# Patient Record
Sex: MALE | Race: WHITE
[De-identification: names, ages, dates, MRNs, and addresses within clinical notes are randomized per-mention and may not be internally consistent; named-entity substitution may affect disease eponyms.]

---

## 2017-08-07 ENCOUNTER — RX RENEWAL (OUTPATIENT)
Age: 77
End: 2017-08-07

## 2021-01-01 ENCOUNTER — INPATIENT (INPATIENT)
Facility: HOSPITAL | Age: 81
LOS: 12 days | DRG: 870 | End: 2021-03-30
Attending: SURGERY | Admitting: INTERNAL MEDICINE
Payer: COMMERCIAL

## 2021-01-01 VITALS
TEMPERATURE: 101 F | OXYGEN SATURATION: 89 % | DIASTOLIC BLOOD PRESSURE: 77 MMHG | WEIGHT: 134.92 LBS | SYSTOLIC BLOOD PRESSURE: 173 MMHG | HEIGHT: 62 IN | RESPIRATION RATE: 28 BRPM | HEART RATE: 118 BPM

## 2021-01-01 DIAGNOSIS — U07.1 COVID-19: ICD-10-CM

## 2021-01-01 LAB
ADD ON TEST-SPECIMEN IN LAB: SIGNIFICANT CHANGE UP
ALBUMIN SERPL ELPH-MCNC: 1.8 G/DL — LOW (ref 3.3–5)
ALBUMIN SERPL ELPH-MCNC: 1.8 G/DL — LOW (ref 3.3–5)
ALBUMIN SERPL ELPH-MCNC: 1.9 G/DL — LOW (ref 3.3–5)
ALBUMIN SERPL ELPH-MCNC: 2 G/DL — LOW (ref 3.3–5)
ALBUMIN SERPL ELPH-MCNC: 2.1 G/DL — LOW (ref 3.3–5)
ALBUMIN SERPL ELPH-MCNC: 2.3 G/DL — LOW (ref 3.3–5)
ALBUMIN SERPL ELPH-MCNC: 2.3 G/DL — LOW (ref 3.3–5)
ALBUMIN SERPL ELPH-MCNC: 2.6 G/DL — LOW (ref 3.3–5)
ALP SERPL-CCNC: 121 U/L — HIGH (ref 40–120)
ALP SERPL-CCNC: 132 U/L — HIGH (ref 40–120)
ALP SERPL-CCNC: 140 U/L — HIGH (ref 40–120)
ALP SERPL-CCNC: 142 U/L — HIGH (ref 40–120)
ALP SERPL-CCNC: 145 U/L — HIGH (ref 40–120)
ALP SERPL-CCNC: 147 U/L — HIGH (ref 40–120)
ALP SERPL-CCNC: 151 U/L — HIGH (ref 40–120)
ALP SERPL-CCNC: 155 U/L — HIGH (ref 40–120)
ALP SERPL-CCNC: 160 U/L — HIGH (ref 40–120)
ALP SERPL-CCNC: 82 U/L — SIGNIFICANT CHANGE UP (ref 40–120)
ALP SERPL-CCNC: 98 U/L — SIGNIFICANT CHANGE UP (ref 40–120)
ALT FLD-CCNC: 110 U/L — HIGH (ref 12–78)
ALT FLD-CCNC: 33 U/L — SIGNIFICANT CHANGE UP (ref 12–78)
ALT FLD-CCNC: 38 U/L — SIGNIFICANT CHANGE UP (ref 12–78)
ALT FLD-CCNC: 59 U/L — SIGNIFICANT CHANGE UP (ref 12–78)
ALT FLD-CCNC: 68 U/L — SIGNIFICANT CHANGE UP (ref 12–78)
ALT FLD-CCNC: 73 U/L — SIGNIFICANT CHANGE UP (ref 12–78)
ALT FLD-CCNC: 80 U/L — HIGH (ref 12–78)
ALT FLD-CCNC: 94 U/L — HIGH (ref 12–78)
ALT FLD-CCNC: 95 U/L — HIGH (ref 12–78)
ALT FLD-CCNC: 98 U/L — HIGH (ref 12–78)
ALT FLD-CCNC: 98 U/L — HIGH (ref 12–78)
ANION GAP SERPL CALC-SCNC: 2 MMOL/L — LOW (ref 5–17)
ANION GAP SERPL CALC-SCNC: 4 MMOL/L — LOW (ref 5–17)
ANION GAP SERPL CALC-SCNC: 5 MMOL/L — SIGNIFICANT CHANGE UP (ref 5–17)
ANION GAP SERPL CALC-SCNC: 5 MMOL/L — SIGNIFICANT CHANGE UP (ref 5–17)
ANION GAP SERPL CALC-SCNC: 6 MMOL/L — SIGNIFICANT CHANGE UP (ref 5–17)
ANION GAP SERPL CALC-SCNC: 7 MMOL/L — SIGNIFICANT CHANGE UP (ref 5–17)
ANION GAP SERPL CALC-SCNC: 8 MMOL/L — SIGNIFICANT CHANGE UP (ref 5–17)
APPEARANCE UR: CLEAR — SIGNIFICANT CHANGE UP
APPEARANCE UR: CLEAR — SIGNIFICANT CHANGE UP
AST SERPL-CCNC: 118 U/L — HIGH (ref 15–37)
AST SERPL-CCNC: 120 U/L — HIGH (ref 15–37)
AST SERPL-CCNC: 129 U/L — HIGH (ref 15–37)
AST SERPL-CCNC: 134 U/L — HIGH (ref 15–37)
AST SERPL-CCNC: 142 U/L — HIGH (ref 15–37)
AST SERPL-CCNC: 36 U/L — SIGNIFICANT CHANGE UP (ref 15–37)
AST SERPL-CCNC: 38 U/L — HIGH (ref 15–37)
AST SERPL-CCNC: 47 U/L — HIGH (ref 15–37)
AST SERPL-CCNC: 50 U/L — HIGH (ref 15–37)
AST SERPL-CCNC: 54 U/L — HIGH (ref 15–37)
AST SERPL-CCNC: 67 U/L — HIGH (ref 15–37)
BACTERIA # UR AUTO: ABNORMAL
BASE EXCESS BLDA CALC-SCNC: -8.5 MMOL/L — LOW (ref -2–2)
BASE EXCESS BLDA CALC-SCNC: 0.4 MMOL/L — SIGNIFICANT CHANGE UP (ref -2–2)
BASE EXCESS BLDA CALC-SCNC: 1.7 MMOL/L — SIGNIFICANT CHANGE UP (ref -2–2)
BASE EXCESS BLDA CALC-SCNC: 2.2 MMOL/L — HIGH (ref -2–2)
BASE EXCESS BLDA CALC-SCNC: 3.1 MMOL/L — HIGH (ref -2–2)
BASE EXCESS BLDV CALC-SCNC: -1.6 MMOL/L — SIGNIFICANT CHANGE UP (ref -2–2)
BASOPHILS # BLD AUTO: 0.01 K/UL — SIGNIFICANT CHANGE UP (ref 0–0.2)
BASOPHILS NFR BLD AUTO: 0.1 % — SIGNIFICANT CHANGE UP (ref 0–2)
BILIRUB DIRECT SERPL-MCNC: 0.1 MG/DL — SIGNIFICANT CHANGE UP (ref 0–0.2)
BILIRUB DIRECT SERPL-MCNC: 0.2 MG/DL — SIGNIFICANT CHANGE UP (ref 0–0.2)
BILIRUB DIRECT SERPL-MCNC: 0.4 MG/DL — HIGH (ref 0–0.2)
BILIRUB INDIRECT FLD-MCNC: 0.2 MG/DL — SIGNIFICANT CHANGE UP (ref 0.2–1)
BILIRUB INDIRECT FLD-MCNC: 0.3 MG/DL — SIGNIFICANT CHANGE UP (ref 0.2–1)
BILIRUB INDIRECT FLD-MCNC: 0.4 MG/DL — SIGNIFICANT CHANGE UP (ref 0.2–1)
BILIRUB INDIRECT FLD-MCNC: 0.5 MG/DL — SIGNIFICANT CHANGE UP (ref 0.2–1)
BILIRUB SERPL-MCNC: 0.4 MG/DL — SIGNIFICANT CHANGE UP (ref 0.2–1.2)
BILIRUB SERPL-MCNC: 0.5 MG/DL — SIGNIFICANT CHANGE UP (ref 0.2–1.2)
BILIRUB SERPL-MCNC: 0.6 MG/DL — SIGNIFICANT CHANGE UP (ref 0.2–1.2)
BILIRUB SERPL-MCNC: 0.7 MG/DL — SIGNIFICANT CHANGE UP (ref 0.2–1.2)
BILIRUB SERPL-MCNC: 0.8 MG/DL — SIGNIFICANT CHANGE UP (ref 0.2–1.2)
BILIRUB SERPL-MCNC: 0.8 MG/DL — SIGNIFICANT CHANGE UP (ref 0.2–1.2)
BILIRUB UR-MCNC: NEGATIVE — SIGNIFICANT CHANGE UP
BILIRUB UR-MCNC: NEGATIVE — SIGNIFICANT CHANGE UP
BLOOD GAS COMMENTS ARTERIAL: SIGNIFICANT CHANGE UP
BUN SERPL-MCNC: 17 MG/DL — SIGNIFICANT CHANGE UP (ref 7–23)
BUN SERPL-MCNC: 37 MG/DL — HIGH (ref 7–23)
BUN SERPL-MCNC: 45 MG/DL — HIGH (ref 7–23)
BUN SERPL-MCNC: 50 MG/DL — HIGH (ref 7–23)
BUN SERPL-MCNC: 52 MG/DL — HIGH (ref 7–23)
BUN SERPL-MCNC: 53 MG/DL — HIGH (ref 7–23)
BUN SERPL-MCNC: 55 MG/DL — HIGH (ref 7–23)
BUN SERPL-MCNC: 58 MG/DL — HIGH (ref 7–23)
BUN SERPL-MCNC: 60 MG/DL — HIGH (ref 7–23)
BUN SERPL-MCNC: 61 MG/DL — HIGH (ref 7–23)
BUN SERPL-MCNC: 63 MG/DL — HIGH (ref 7–23)
CALCIUM SERPL-MCNC: 8 MG/DL — LOW (ref 8.5–10.1)
CALCIUM SERPL-MCNC: 8.4 MG/DL — LOW (ref 8.5–10.1)
CALCIUM SERPL-MCNC: 8.4 MG/DL — LOW (ref 8.5–10.1)
CALCIUM SERPL-MCNC: 8.5 MG/DL — SIGNIFICANT CHANGE UP (ref 8.5–10.1)
CALCIUM SERPL-MCNC: 8.6 MG/DL — SIGNIFICANT CHANGE UP (ref 8.5–10.1)
CALCIUM SERPL-MCNC: 8.7 MG/DL — SIGNIFICANT CHANGE UP (ref 8.5–10.1)
CALCIUM SERPL-MCNC: 8.9 MG/DL — SIGNIFICANT CHANGE UP (ref 8.5–10.1)
CALCIUM SERPL-MCNC: 8.9 MG/DL — SIGNIFICANT CHANGE UP (ref 8.5–10.1)
CALCIUM SERPL-MCNC: 9.4 MG/DL — SIGNIFICANT CHANGE UP (ref 8.5–10.1)
CALCIUM SERPL-MCNC: 9.5 MG/DL — SIGNIFICANT CHANGE UP (ref 8.5–10.1)
CALCIUM SERPL-MCNC: 9.6 MG/DL — SIGNIFICANT CHANGE UP (ref 8.5–10.1)
CHLORIDE SERPL-SCNC: 106 MMOL/L — SIGNIFICANT CHANGE UP (ref 96–108)
CHLORIDE SERPL-SCNC: 108 MMOL/L — SIGNIFICANT CHANGE UP (ref 96–108)
CHLORIDE SERPL-SCNC: 109 MMOL/L — HIGH (ref 96–108)
CHLORIDE SERPL-SCNC: 109 MMOL/L — HIGH (ref 96–108)
CHLORIDE SERPL-SCNC: 112 MMOL/L — HIGH (ref 96–108)
CHLORIDE SERPL-SCNC: 112 MMOL/L — HIGH (ref 96–108)
CHLORIDE SERPL-SCNC: 113 MMOL/L — HIGH (ref 96–108)
CHLORIDE SERPL-SCNC: 114 MMOL/L — HIGH (ref 96–108)
CHLORIDE SERPL-SCNC: 114 MMOL/L — HIGH (ref 96–108)
CHLORIDE SERPL-SCNC: 117 MMOL/L — HIGH (ref 96–108)
CHLORIDE SERPL-SCNC: 117 MMOL/L — HIGH (ref 96–108)
CK SERPL-CCNC: 1024 U/L — HIGH (ref 26–308)
CK SERPL-CCNC: 1237 U/L — HIGH (ref 26–308)
CK SERPL-CCNC: 1329 U/L — HIGH (ref 26–308)
CK SERPL-CCNC: 1361 U/L — HIGH (ref 26–308)
CO2 SERPL-SCNC: 22 MMOL/L — SIGNIFICANT CHANGE UP (ref 22–31)
CO2 SERPL-SCNC: 23 MMOL/L — SIGNIFICANT CHANGE UP (ref 22–31)
CO2 SERPL-SCNC: 23 MMOL/L — SIGNIFICANT CHANGE UP (ref 22–31)
CO2 SERPL-SCNC: 25 MMOL/L — SIGNIFICANT CHANGE UP (ref 22–31)
CO2 SERPL-SCNC: 26 MMOL/L — SIGNIFICANT CHANGE UP (ref 22–31)
CO2 SERPL-SCNC: 28 MMOL/L — SIGNIFICANT CHANGE UP (ref 22–31)
CO2 SERPL-SCNC: 29 MMOL/L — SIGNIFICANT CHANGE UP (ref 22–31)
COLOR SPEC: ABNORMAL
COLOR SPEC: YELLOW — SIGNIFICANT CHANGE UP
COMMENT - URINE: SIGNIFICANT CHANGE UP
CREAT SERPL-MCNC: 0.73 MG/DL — SIGNIFICANT CHANGE UP (ref 0.5–1.3)
CREAT SERPL-MCNC: 0.75 MG/DL — SIGNIFICANT CHANGE UP (ref 0.5–1.3)
CREAT SERPL-MCNC: 0.8 MG/DL — SIGNIFICANT CHANGE UP (ref 0.5–1.3)
CREAT SERPL-MCNC: 0.83 MG/DL — SIGNIFICANT CHANGE UP (ref 0.5–1.3)
CREAT SERPL-MCNC: 0.84 MG/DL — SIGNIFICANT CHANGE UP (ref 0.5–1.3)
CREAT SERPL-MCNC: 0.87 MG/DL — SIGNIFICANT CHANGE UP (ref 0.5–1.3)
CREAT SERPL-MCNC: 0.9 MG/DL — SIGNIFICANT CHANGE UP (ref 0.5–1.3)
CREAT SERPL-MCNC: 0.91 MG/DL — SIGNIFICANT CHANGE UP (ref 0.5–1.3)
CREAT SERPL-MCNC: 1 MG/DL — SIGNIFICANT CHANGE UP (ref 0.5–1.3)
CREAT SERPL-MCNC: 1.01 MG/DL — SIGNIFICANT CHANGE UP (ref 0.5–1.3)
CREAT SERPL-MCNC: 1.01 MG/DL — SIGNIFICANT CHANGE UP (ref 0.5–1.3)
CREAT SERPL-MCNC: 1.05 MG/DL — SIGNIFICANT CHANGE UP (ref 0.5–1.3)
CREAT SERPL-MCNC: 1.09 MG/DL — SIGNIFICANT CHANGE UP (ref 0.5–1.3)
CRP SERPL-MCNC: 273 MG/L — HIGH
CRP SERPL-MCNC: 35 MG/L — HIGH
CRP SERPL-MCNC: 78 MG/L — HIGH
CRP SERPL-MCNC: 82 MG/L — HIGH
CULTURE RESULTS: NO GROWTH — SIGNIFICANT CHANGE UP
CULTURE RESULTS: SIGNIFICANT CHANGE UP
D DIMER BLD IA.RAPID-MCNC: 2434 NG/ML DDU — HIGH
D DIMER BLD IA.RAPID-MCNC: 3122 NG/ML DDU — HIGH
D DIMER BLD IA.RAPID-MCNC: HIGH NG/ML DDU
DIFF PNL FLD: ABNORMAL
DIFF PNL FLD: ABNORMAL
EOSINOPHIL # BLD AUTO: 0 K/UL — SIGNIFICANT CHANGE UP (ref 0–0.5)
EOSINOPHIL NFR BLD AUTO: 0 % — SIGNIFICANT CHANGE UP (ref 0–6)
EPI CELLS # UR: SIGNIFICANT CHANGE UP
FERRITIN SERPL-MCNC: 475 NG/ML — HIGH (ref 30–400)
FERRITIN SERPL-MCNC: 624 NG/ML — HIGH (ref 30–400)
GAS PNL BLDA: SIGNIFICANT CHANGE UP
GLUCOSE SERPL-MCNC: 102 MG/DL — HIGH (ref 70–99)
GLUCOSE SERPL-MCNC: 111 MG/DL — HIGH (ref 70–99)
GLUCOSE SERPL-MCNC: 113 MG/DL — HIGH (ref 70–99)
GLUCOSE SERPL-MCNC: 114 MG/DL — HIGH (ref 70–99)
GLUCOSE SERPL-MCNC: 115 MG/DL — HIGH (ref 70–99)
GLUCOSE SERPL-MCNC: 118 MG/DL — HIGH (ref 70–99)
GLUCOSE SERPL-MCNC: 118 MG/DL — HIGH (ref 70–99)
GLUCOSE SERPL-MCNC: 119 MG/DL — HIGH (ref 70–99)
GLUCOSE SERPL-MCNC: 120 MG/DL — HIGH (ref 70–99)
GLUCOSE SERPL-MCNC: 136 MG/DL — HIGH (ref 70–99)
GLUCOSE SERPL-MCNC: 85 MG/DL — SIGNIFICANT CHANGE UP (ref 70–99)
GLUCOSE UR QL: NEGATIVE MG/DL — SIGNIFICANT CHANGE UP
GLUCOSE UR QL: NEGATIVE — SIGNIFICANT CHANGE UP
HCO3 BLDA-SCNC: 19 MMOL/L — LOW (ref 21–29)
HCO3 BLDA-SCNC: 25 MMOL/L — SIGNIFICANT CHANGE UP (ref 21–29)
HCO3 BLDA-SCNC: 25 MMOL/L — SIGNIFICANT CHANGE UP (ref 21–29)
HCO3 BLDA-SCNC: 26 MMOL/L — SIGNIFICANT CHANGE UP (ref 21–29)
HCO3 BLDA-SCNC: 26 MMOL/L — SIGNIFICANT CHANGE UP (ref 21–29)
HCO3 BLDV-SCNC: 22 MMOL/L — SIGNIFICANT CHANGE UP (ref 21–29)
HCT VFR BLD CALC: 34.2 % — LOW (ref 39–50)
HCT VFR BLD CALC: 35.4 % — LOW (ref 39–50)
HCT VFR BLD CALC: 35.9 % — LOW (ref 39–50)
HCT VFR BLD CALC: 36.2 % — LOW (ref 39–50)
HCT VFR BLD CALC: 36.8 % — LOW (ref 39–50)
HCT VFR BLD CALC: 38.1 % — LOW (ref 39–50)
HCT VFR BLD CALC: 38.2 % — LOW (ref 39–50)
HCT VFR BLD CALC: 38.3 % — LOW (ref 39–50)
HCT VFR BLD CALC: 40.3 % — SIGNIFICANT CHANGE UP (ref 39–50)
HGB BLD-MCNC: 11.4 G/DL — LOW (ref 13–17)
HGB BLD-MCNC: 11.5 G/DL — LOW (ref 13–17)
HGB BLD-MCNC: 11.7 G/DL — LOW (ref 13–17)
HGB BLD-MCNC: 11.9 G/DL — LOW (ref 13–17)
HGB BLD-MCNC: 12 G/DL — LOW (ref 13–17)
HGB BLD-MCNC: 12 G/DL — LOW (ref 13–17)
HGB BLD-MCNC: 12.4 G/DL — LOW (ref 13–17)
HGB BLD-MCNC: 12.7 G/DL — LOW (ref 13–17)
HGB BLD-MCNC: 12.9 G/DL — LOW (ref 13–17)
HYALINE CASTS # UR AUTO: ABNORMAL /LPF
IMM GRANULOCYTES NFR BLD AUTO: 0.9 % — SIGNIFICANT CHANGE UP (ref 0–1.5)
INR BLD: 1.08 RATIO — SIGNIFICANT CHANGE UP (ref 0.88–1.16)
INR BLD: 1.31 RATIO — HIGH (ref 0.88–1.16)
INR BLD: 1.46 RATIO — HIGH (ref 0.88–1.16)
INR BLD: 1.48 RATIO — HIGH (ref 0.88–1.16)
INR BLD: 1.54 RATIO — HIGH (ref 0.88–1.16)
INR BLD: 1.56 RATIO — HIGH (ref 0.88–1.16)
INR BLD: 1.61 RATIO — HIGH (ref 0.88–1.16)
INR BLD: 1.78 RATIO — HIGH (ref 0.88–1.16)
KETONES UR-MCNC: ABNORMAL
KETONES UR-MCNC: NEGATIVE — SIGNIFICANT CHANGE UP
LACTATE SERPL-SCNC: 1.6 MMOL/L — SIGNIFICANT CHANGE UP (ref 0.7–2)
LACTATE SERPL-SCNC: 1.8 MMOL/L — SIGNIFICANT CHANGE UP (ref 0.7–2)
LACTATE SERPL-SCNC: 2.1 MMOL/L — HIGH (ref 0.7–2)
LACTATE SERPL-SCNC: 2.4 MMOL/L — HIGH (ref 0.7–2)
LEUKOCYTE ESTERASE UR-ACNC: NEGATIVE — SIGNIFICANT CHANGE UP
LEUKOCYTE ESTERASE UR-ACNC: NEGATIVE — SIGNIFICANT CHANGE UP
LYMPHOCYTES # BLD AUTO: 0.55 K/UL — LOW (ref 1–3.3)
LYMPHOCYTES # BLD AUTO: 4 % — LOW (ref 13–44)
MAGNESIUM SERPL-MCNC: 2.4 MG/DL — SIGNIFICANT CHANGE UP (ref 1.6–2.6)
MAGNESIUM SERPL-MCNC: 2.4 MG/DL — SIGNIFICANT CHANGE UP (ref 1.6–2.6)
MAGNESIUM SERPL-MCNC: 2.5 MG/DL — SIGNIFICANT CHANGE UP (ref 1.6–2.6)
MAGNESIUM SERPL-MCNC: 2.6 MG/DL — SIGNIFICANT CHANGE UP (ref 1.6–2.6)
MAGNESIUM SERPL-MCNC: 2.6 MG/DL — SIGNIFICANT CHANGE UP (ref 1.6–2.6)
MAGNESIUM SERPL-MCNC: 2.7 MG/DL — HIGH (ref 1.6–2.6)
MAGNESIUM SERPL-MCNC: 2.7 MG/DL — HIGH (ref 1.6–2.6)
MAGNESIUM SERPL-MCNC: 3 MG/DL — HIGH (ref 1.6–2.6)
MCHC RBC-ENTMCNC: 30.8 PG — SIGNIFICANT CHANGE UP (ref 27–34)
MCHC RBC-ENTMCNC: 30.9 PG — SIGNIFICANT CHANGE UP (ref 27–34)
MCHC RBC-ENTMCNC: 31.1 PG — SIGNIFICANT CHANGE UP (ref 27–34)
MCHC RBC-ENTMCNC: 31.2 PG — SIGNIFICANT CHANGE UP (ref 27–34)
MCHC RBC-ENTMCNC: 31.3 PG — SIGNIFICANT CHANGE UP (ref 27–34)
MCHC RBC-ENTMCNC: 31.3 PG — SIGNIFICANT CHANGE UP (ref 27–34)
MCHC RBC-ENTMCNC: 31.4 GM/DL — LOW (ref 32–36)
MCHC RBC-ENTMCNC: 31.5 GM/DL — LOW (ref 32–36)
MCHC RBC-ENTMCNC: 31.5 PG — SIGNIFICANT CHANGE UP (ref 27–34)
MCHC RBC-ENTMCNC: 31.8 GM/DL — LOW (ref 32–36)
MCHC RBC-ENTMCNC: 32.2 GM/DL — SIGNIFICANT CHANGE UP (ref 32–36)
MCHC RBC-ENTMCNC: 32.4 GM/DL — SIGNIFICANT CHANGE UP (ref 32–36)
MCHC RBC-ENTMCNC: 32.9 GM/DL — SIGNIFICANT CHANGE UP (ref 32–36)
MCHC RBC-ENTMCNC: 33.4 GM/DL — SIGNIFICANT CHANGE UP (ref 32–36)
MCHC RBC-ENTMCNC: 33.6 GM/DL — SIGNIFICANT CHANGE UP (ref 32–36)
MCHC RBC-ENTMCNC: 33.9 GM/DL — SIGNIFICANT CHANGE UP (ref 32–36)
MCV RBC AUTO: 93.2 FL — SIGNIFICANT CHANGE UP (ref 80–100)
MCV RBC AUTO: 93.2 FL — SIGNIFICANT CHANGE UP (ref 80–100)
MCV RBC AUTO: 93.7 FL — SIGNIFICANT CHANGE UP (ref 80–100)
MCV RBC AUTO: 94.5 FL — SIGNIFICANT CHANGE UP (ref 80–100)
MCV RBC AUTO: 96.2 FL — SIGNIFICANT CHANGE UP (ref 80–100)
MCV RBC AUTO: 96.5 FL — SIGNIFICANT CHANGE UP (ref 80–100)
MCV RBC AUTO: 97.1 FL — SIGNIFICANT CHANGE UP (ref 80–100)
MCV RBC AUTO: 97.6 FL — SIGNIFICANT CHANGE UP (ref 80–100)
MCV RBC AUTO: 99 FL — SIGNIFICANT CHANGE UP (ref 80–100)
MONOCYTES # BLD AUTO: 1.25 K/UL — HIGH (ref 0–0.9)
MONOCYTES NFR BLD AUTO: 9.1 % — SIGNIFICANT CHANGE UP (ref 2–14)
NEUTROPHILS # BLD AUTO: 11.77 K/UL — HIGH (ref 1.8–7.4)
NEUTROPHILS NFR BLD AUTO: 85.9 % — HIGH (ref 43–77)
NITRITE UR-MCNC: NEGATIVE — SIGNIFICANT CHANGE UP
NITRITE UR-MCNC: NEGATIVE — SIGNIFICANT CHANGE UP
PCO2 BLDA: 33 MMHG — SIGNIFICANT CHANGE UP (ref 32–46)
PCO2 BLDA: 36 MMHG — SIGNIFICANT CHANGE UP (ref 32–46)
PCO2 BLDA: 40 MMHG — SIGNIFICANT CHANGE UP (ref 32–46)
PCO2 BLDA: 42 MMHG — SIGNIFICANT CHANGE UP (ref 32–46)
PCO2 BLDA: 52 MMHG — HIGH (ref 32–46)
PCO2 BLDV: 34 MMHG — LOW (ref 35–50)
PH BLDA: 7.19 — CRITICAL LOW (ref 7.35–7.45)
PH BLDA: 7.39 — SIGNIFICANT CHANGE UP (ref 7.35–7.45)
PH BLDA: 7.43 — SIGNIFICANT CHANGE UP (ref 7.35–7.45)
PH BLDA: 7.46 — HIGH (ref 7.35–7.45)
PH BLDA: 7.5 — HIGH (ref 7.35–7.45)
PH BLDV: 7.42 — SIGNIFICANT CHANGE UP (ref 7.35–7.45)
PH UR: 5 — SIGNIFICANT CHANGE UP (ref 5–8)
PH UR: 5 — SIGNIFICANT CHANGE UP (ref 5–8)
PHOSPHATE SERPL-MCNC: 2.6 MG/DL — SIGNIFICANT CHANGE UP (ref 2.5–4.5)
PHOSPHATE SERPL-MCNC: 2.7 MG/DL — SIGNIFICANT CHANGE UP (ref 2.5–4.5)
PHOSPHATE SERPL-MCNC: 2.8 MG/DL — SIGNIFICANT CHANGE UP (ref 2.5–4.5)
PHOSPHATE SERPL-MCNC: 2.9 MG/DL — SIGNIFICANT CHANGE UP (ref 2.5–4.5)
PHOSPHATE SERPL-MCNC: 2.9 MG/DL — SIGNIFICANT CHANGE UP (ref 2.5–4.5)
PHOSPHATE SERPL-MCNC: 3.1 MG/DL — SIGNIFICANT CHANGE UP (ref 2.5–4.5)
PHOSPHATE SERPL-MCNC: 3.6 MG/DL — SIGNIFICANT CHANGE UP (ref 2.5–4.5)
PHOSPHATE SERPL-MCNC: 4.2 MG/DL — SIGNIFICANT CHANGE UP (ref 2.5–4.5)
PLATELET # BLD AUTO: 424 K/UL — HIGH (ref 150–400)
PLATELET # BLD AUTO: 447 K/UL — HIGH (ref 150–400)
PLATELET # BLD AUTO: 453 K/UL — HIGH (ref 150–400)
PLATELET # BLD AUTO: 461 K/UL — HIGH (ref 150–400)
PLATELET # BLD AUTO: 476 K/UL — HIGH (ref 150–400)
PLATELET # BLD AUTO: 486 K/UL — HIGH (ref 150–400)
PLATELET # BLD AUTO: 513 K/UL — HIGH (ref 150–400)
PLATELET # BLD AUTO: 557 K/UL — HIGH (ref 150–400)
PLATELET # BLD AUTO: 688 K/UL — HIGH (ref 150–400)
PO2 BLDA: 105 MMHG — SIGNIFICANT CHANGE UP (ref 74–108)
PO2 BLDA: 70 MMHG — LOW (ref 74–108)
PO2 BLDA: 81 MMHG — SIGNIFICANT CHANGE UP (ref 74–108)
PO2 BLDA: 84 MMHG — SIGNIFICANT CHANGE UP (ref 74–108)
PO2 BLDA: 84 MMHG — SIGNIFICANT CHANGE UP (ref 74–108)
PO2 BLDV: 36 MMHG — SIGNIFICANT CHANGE UP (ref 25–45)
POTASSIUM SERPL-MCNC: 3.9 MMOL/L — SIGNIFICANT CHANGE UP (ref 3.5–5.3)
POTASSIUM SERPL-MCNC: 3.9 MMOL/L — SIGNIFICANT CHANGE UP (ref 3.5–5.3)
POTASSIUM SERPL-MCNC: 4 MMOL/L — SIGNIFICANT CHANGE UP (ref 3.5–5.3)
POTASSIUM SERPL-MCNC: 4.1 MMOL/L — SIGNIFICANT CHANGE UP (ref 3.5–5.3)
POTASSIUM SERPL-MCNC: 4.1 MMOL/L — SIGNIFICANT CHANGE UP (ref 3.5–5.3)
POTASSIUM SERPL-MCNC: 4.3 MMOL/L — SIGNIFICANT CHANGE UP (ref 3.5–5.3)
POTASSIUM SERPL-MCNC: 4.5 MMOL/L — SIGNIFICANT CHANGE UP (ref 3.5–5.3)
POTASSIUM SERPL-MCNC: 4.7 MMOL/L — SIGNIFICANT CHANGE UP (ref 3.5–5.3)
POTASSIUM SERPL-SCNC: 3.9 MMOL/L — SIGNIFICANT CHANGE UP (ref 3.5–5.3)
POTASSIUM SERPL-SCNC: 3.9 MMOL/L — SIGNIFICANT CHANGE UP (ref 3.5–5.3)
POTASSIUM SERPL-SCNC: 4 MMOL/L — SIGNIFICANT CHANGE UP (ref 3.5–5.3)
POTASSIUM SERPL-SCNC: 4.1 MMOL/L — SIGNIFICANT CHANGE UP (ref 3.5–5.3)
POTASSIUM SERPL-SCNC: 4.1 MMOL/L — SIGNIFICANT CHANGE UP (ref 3.5–5.3)
POTASSIUM SERPL-SCNC: 4.3 MMOL/L — SIGNIFICANT CHANGE UP (ref 3.5–5.3)
POTASSIUM SERPL-SCNC: 4.5 MMOL/L — SIGNIFICANT CHANGE UP (ref 3.5–5.3)
POTASSIUM SERPL-SCNC: 4.7 MMOL/L — SIGNIFICANT CHANGE UP (ref 3.5–5.3)
PROCALCITONIN SERPL-MCNC: 1.89 NG/ML — HIGH (ref 0.02–0.1)
PROT SERPL-MCNC: 5.8 GM/DL — LOW (ref 6–8.3)
PROT SERPL-MCNC: 6.1 GM/DL — SIGNIFICANT CHANGE UP (ref 6–8.3)
PROT SERPL-MCNC: 6.1 GM/DL — SIGNIFICANT CHANGE UP (ref 6–8.3)
PROT SERPL-MCNC: 6.6 GM/DL — SIGNIFICANT CHANGE UP (ref 6–8.3)
PROT SERPL-MCNC: 6.9 GM/DL — SIGNIFICANT CHANGE UP (ref 6–8.3)
PROT SERPL-MCNC: 7 GM/DL — SIGNIFICANT CHANGE UP (ref 6–8.3)
PROT SERPL-MCNC: 7.2 GM/DL — SIGNIFICANT CHANGE UP (ref 6–8.3)
PROT SERPL-MCNC: 7.3 GM/DL — SIGNIFICANT CHANGE UP (ref 6–8.3)
PROT SERPL-MCNC: 7.4 GM/DL — SIGNIFICANT CHANGE UP (ref 6–8.3)
PROT SERPL-MCNC: 7.5 GM/DL — SIGNIFICANT CHANGE UP (ref 6–8.3)
PROT SERPL-MCNC: 8.3 GM/DL — SIGNIFICANT CHANGE UP (ref 6–8.3)
PROT UR-MCNC: 100
PROT UR-MCNC: 30 MG/DL
PROTHROM AB SERPL-ACNC: 12.5 SEC — SIGNIFICANT CHANGE UP (ref 10.6–13.6)
PROTHROM AB SERPL-ACNC: 15.1 SEC — HIGH (ref 10.6–13.6)
PROTHROM AB SERPL-ACNC: 16.8 SEC — HIGH (ref 10.6–13.6)
PROTHROM AB SERPL-ACNC: 16.9 SEC — HIGH (ref 10.6–13.6)
PROTHROM AB SERPL-ACNC: 17.6 SEC — HIGH (ref 10.6–13.6)
PROTHROM AB SERPL-ACNC: 17.7 SEC — HIGH (ref 10.6–13.6)
PROTHROM AB SERPL-ACNC: 18.3 SEC — HIGH (ref 10.6–13.6)
PROTHROM AB SERPL-ACNC: 20.1 SEC — HIGH (ref 10.6–13.6)
RBC # BLD: 3.67 M/UL — LOW (ref 4.2–5.8)
RBC # BLD: 3.67 M/UL — LOW (ref 4.2–5.8)
RBC # BLD: 3.79 M/UL — LOW (ref 4.2–5.8)
RBC # BLD: 3.83 M/UL — LOW (ref 4.2–5.8)
RBC # BLD: 3.83 M/UL — LOW (ref 4.2–5.8)
RBC # BLD: 3.86 M/UL — LOW (ref 4.2–5.8)
RBC # BLD: 3.98 M/UL — LOW (ref 4.2–5.8)
RBC # BLD: 4.09 M/UL — LOW (ref 4.2–5.8)
RBC # BLD: 4.13 M/UL — LOW (ref 4.2–5.8)
RBC # FLD: 13.3 % — SIGNIFICANT CHANGE UP (ref 10.3–14.5)
RBC # FLD: 13.3 % — SIGNIFICANT CHANGE UP (ref 10.3–14.5)
RBC # FLD: 13.5 % — SIGNIFICANT CHANGE UP (ref 10.3–14.5)
RBC # FLD: 13.6 % — SIGNIFICANT CHANGE UP (ref 10.3–14.5)
RBC # FLD: 13.7 % — SIGNIFICANT CHANGE UP (ref 10.3–14.5)
RBC # FLD: 13.7 % — SIGNIFICANT CHANGE UP (ref 10.3–14.5)
RBC CASTS # UR COMP ASSIST: SIGNIFICANT CHANGE UP /HPF (ref 0–4)
SAO2 % BLDA: 92 % — SIGNIFICANT CHANGE UP (ref 92–96)
SAO2 % BLDA: 94 % — SIGNIFICANT CHANGE UP (ref 92–96)
SAO2 % BLDA: 95 % — SIGNIFICANT CHANGE UP (ref 92–96)
SAO2 % BLDA: 96 % — SIGNIFICANT CHANGE UP (ref 92–96)
SAO2 % BLDA: 96 % — SIGNIFICANT CHANGE UP (ref 92–96)
SAO2 % BLDV: 62 % — LOW (ref 67–88)
SARS-COV-2 RNA SPEC QL NAA+PROBE: DETECTED
SODIUM SERPL-SCNC: 136 MMOL/L — SIGNIFICANT CHANGE UP (ref 135–145)
SODIUM SERPL-SCNC: 138 MMOL/L — SIGNIFICANT CHANGE UP (ref 135–145)
SODIUM SERPL-SCNC: 139 MMOL/L — SIGNIFICANT CHANGE UP (ref 135–145)
SODIUM SERPL-SCNC: 140 MMOL/L — SIGNIFICANT CHANGE UP (ref 135–145)
SODIUM SERPL-SCNC: 141 MMOL/L — SIGNIFICANT CHANGE UP (ref 135–145)
SODIUM SERPL-SCNC: 143 MMOL/L — SIGNIFICANT CHANGE UP (ref 135–145)
SODIUM SERPL-SCNC: 144 MMOL/L — SIGNIFICANT CHANGE UP (ref 135–145)
SODIUM SERPL-SCNC: 146 MMOL/L — HIGH (ref 135–145)
SODIUM SERPL-SCNC: 146 MMOL/L — HIGH (ref 135–145)
SODIUM SERPL-SCNC: 147 MMOL/L — HIGH (ref 135–145)
SODIUM SERPL-SCNC: 148 MMOL/L — HIGH (ref 135–145)
SP GR SPEC: 1.01 — SIGNIFICANT CHANGE UP (ref 1.01–1.02)
SP GR SPEC: 1.01 — SIGNIFICANT CHANGE UP (ref 1.01–1.02)
SPECIMEN SOURCE: SIGNIFICANT CHANGE UP
TRIGL SERPL-MCNC: 273 MG/DL — HIGH
TRIGL SERPL-MCNC: 3147 MG/DL — HIGH
TSH SERPL-MCNC: 0.5 UU/ML — SIGNIFICANT CHANGE UP (ref 0.34–4.82)
URATE CRY FLD QL MICRO: ABNORMAL
UROBILINOGEN FLD QL: 4 MG/DL
UROBILINOGEN FLD QL: NEGATIVE — SIGNIFICANT CHANGE UP
WBC # BLD: 12.36 K/UL — HIGH (ref 3.8–10.5)
WBC # BLD: 13.71 K/UL — HIGH (ref 3.8–10.5)
WBC # BLD: 21.29 K/UL — HIGH (ref 3.8–10.5)
WBC # BLD: 24.61 K/UL — HIGH (ref 3.8–10.5)
WBC # BLD: 26.06 K/UL — HIGH (ref 3.8–10.5)
WBC # BLD: 27.04 K/UL — HIGH (ref 3.8–10.5)
WBC # BLD: 27.08 K/UL — HIGH (ref 3.8–10.5)
WBC # BLD: 32.34 K/UL — HIGH (ref 3.8–10.5)
WBC # BLD: 39.1 K/UL — HIGH (ref 3.8–10.5)
WBC # FLD AUTO: 12.36 K/UL — HIGH (ref 3.8–10.5)
WBC # FLD AUTO: 13.71 K/UL — HIGH (ref 3.8–10.5)
WBC # FLD AUTO: 21.29 K/UL — HIGH (ref 3.8–10.5)
WBC # FLD AUTO: 24.61 K/UL — HIGH (ref 3.8–10.5)
WBC # FLD AUTO: 26.06 K/UL — HIGH (ref 3.8–10.5)
WBC # FLD AUTO: 27.04 K/UL — HIGH (ref 3.8–10.5)
WBC # FLD AUTO: 27.08 K/UL — HIGH (ref 3.8–10.5)
WBC # FLD AUTO: 32.34 K/UL — HIGH (ref 3.8–10.5)
WBC # FLD AUTO: 39.1 K/UL — HIGH (ref 3.8–10.5)
WBC UR QL: SIGNIFICANT CHANGE UP

## 2021-01-01 PROCEDURE — 71045 X-RAY EXAM CHEST 1 VIEW: CPT | Mod: 26

## 2021-01-01 PROCEDURE — 86140 C-REACTIVE PROTEIN: CPT

## 2021-01-01 PROCEDURE — 92526 ORAL FUNCTION THERAPY: CPT | Mod: GN

## 2021-01-01 PROCEDURE — 87040 BLOOD CULTURE FOR BACTERIA: CPT

## 2021-01-01 PROCEDURE — 93306 TTE W/DOPPLER COMPLETE: CPT

## 2021-01-01 PROCEDURE — 97530 THERAPEUTIC ACTIVITIES: CPT | Mod: GP

## 2021-01-01 PROCEDURE — 71045 X-RAY EXAM CHEST 1 VIEW: CPT

## 2021-01-01 PROCEDURE — 84100 ASSAY OF PHOSPHORUS: CPT

## 2021-01-01 PROCEDURE — 93880 EXTRACRANIAL BILAT STUDY: CPT

## 2021-01-01 PROCEDURE — 85610 PROTHROMBIN TIME: CPT

## 2021-01-01 PROCEDURE — 97110 THERAPEUTIC EXERCISES: CPT | Mod: GP

## 2021-01-01 PROCEDURE — 80076 HEPATIC FUNCTION PANEL: CPT

## 2021-01-01 PROCEDURE — 99232 SBSQ HOSP IP/OBS MODERATE 35: CPT

## 2021-01-01 PROCEDURE — 93970 EXTREMITY STUDY: CPT | Mod: 26

## 2021-01-01 PROCEDURE — 70544 MR ANGIOGRAPHY HEAD W/O DYE: CPT

## 2021-01-01 PROCEDURE — 99291 CRITICAL CARE FIRST HOUR: CPT

## 2021-01-01 PROCEDURE — 99292 CRITICAL CARE ADDL 30 MIN: CPT

## 2021-01-01 PROCEDURE — 94003 VENT MGMT INPAT SUBQ DAY: CPT

## 2021-01-01 PROCEDURE — 82803 BLOOD GASES ANY COMBINATION: CPT

## 2021-01-01 PROCEDURE — 93880 EXTRACRANIAL BILAT STUDY: CPT | Mod: 26

## 2021-01-01 PROCEDURE — 94002 VENT MGMT INPAT INIT DAY: CPT

## 2021-01-01 PROCEDURE — 82550 ASSAY OF CK (CPK): CPT

## 2021-01-01 PROCEDURE — 70450 CT HEAD/BRAIN W/O DYE: CPT | Mod: 26

## 2021-01-01 PROCEDURE — 85027 COMPLETE CBC AUTOMATED: CPT

## 2021-01-01 PROCEDURE — 94640 AIRWAY INHALATION TREATMENT: CPT

## 2021-01-01 PROCEDURE — 83605 ASSAY OF LACTIC ACID: CPT

## 2021-01-01 PROCEDURE — 99222 1ST HOSP IP/OBS MODERATE 55: CPT

## 2021-01-01 PROCEDURE — 93306 TTE W/DOPPLER COMPLETE: CPT | Mod: 26

## 2021-01-01 PROCEDURE — 99233 SBSQ HOSP IP/OBS HIGH 50: CPT

## 2021-01-01 PROCEDURE — 80053 COMPREHEN METABOLIC PANEL: CPT

## 2021-01-01 PROCEDURE — 70450 CT HEAD/BRAIN W/O DYE: CPT | Mod: 26,77

## 2021-01-01 PROCEDURE — 80048 BASIC METABOLIC PNL TOTAL CA: CPT

## 2021-01-01 PROCEDURE — 84478 ASSAY OF TRIGLYCERIDES: CPT

## 2021-01-01 PROCEDURE — 70551 MRI BRAIN STEM W/O DYE: CPT

## 2021-01-01 PROCEDURE — U0003: CPT

## 2021-01-01 PROCEDURE — C9399: CPT

## 2021-01-01 PROCEDURE — 97162 PT EVAL MOD COMPLEX 30 MIN: CPT | Mod: GP

## 2021-01-01 PROCEDURE — 70450 CT HEAD/BRAIN W/O DYE: CPT

## 2021-01-01 PROCEDURE — 71275 CT ANGIOGRAPHY CHEST: CPT | Mod: 26

## 2021-01-01 PROCEDURE — 93010 ELECTROCARDIOGRAM REPORT: CPT

## 2021-01-01 PROCEDURE — 36600 WITHDRAWAL OF ARTERIAL BLOOD: CPT

## 2021-01-01 PROCEDURE — 92507 TX SP LANG VOICE COMM INDIV: CPT | Mod: GN

## 2021-01-01 PROCEDURE — C9113: CPT

## 2021-01-01 PROCEDURE — 85379 FIBRIN DEGRADATION QUANT: CPT

## 2021-01-01 PROCEDURE — 70547 MR ANGIOGRAPHY NECK W/O DYE: CPT

## 2021-01-01 PROCEDURE — 87086 URINE CULTURE/COLONY COUNT: CPT

## 2021-01-01 PROCEDURE — U0005: CPT

## 2021-01-01 PROCEDURE — 70547 MR ANGIOGRAPHY NECK W/O DYE: CPT | Mod: 26

## 2021-01-01 PROCEDURE — 93926 LOWER EXTREMITY STUDY: CPT | Mod: RT

## 2021-01-01 PROCEDURE — 70551 MRI BRAIN STEM W/O DYE: CPT | Mod: 26

## 2021-01-01 PROCEDURE — 82728 ASSAY OF FERRITIN: CPT

## 2021-01-01 PROCEDURE — 93926 LOWER EXTREMITY STUDY: CPT | Mod: 26,RT

## 2021-01-01 PROCEDURE — 82565 ASSAY OF CREATININE: CPT

## 2021-01-01 PROCEDURE — 82962 GLUCOSE BLOOD TEST: CPT

## 2021-01-01 PROCEDURE — 93970 EXTREMITY STUDY: CPT

## 2021-01-01 PROCEDURE — 97116 GAIT TRAINING THERAPY: CPT | Mod: GP

## 2021-01-01 PROCEDURE — 83735 ASSAY OF MAGNESIUM: CPT

## 2021-01-01 PROCEDURE — 84443 ASSAY THYROID STIM HORMONE: CPT

## 2021-01-01 PROCEDURE — 99223 1ST HOSP IP/OBS HIGH 75: CPT

## 2021-01-01 PROCEDURE — 81001 URINALYSIS AUTO W/SCOPE: CPT

## 2021-01-01 PROCEDURE — 70544 MR ANGIOGRAPHY HEAD W/O DYE: CPT | Mod: 26,59

## 2021-01-01 PROCEDURE — 92610 EVALUATE SWALLOWING FUNCTION: CPT | Mod: GN

## 2021-01-01 PROCEDURE — 36415 COLL VENOUS BLD VENIPUNCTURE: CPT

## 2021-01-01 PROCEDURE — 92523 SPEECH SOUND LANG COMPREHEN: CPT | Mod: GN

## 2021-01-01 RX ORDER — ASPIRIN/CALCIUM CARB/MAGNESIUM 324 MG
81 TABLET ORAL DAILY
Refills: 0 | Status: DISCONTINUED | OUTPATIENT
Start: 2021-01-01 | End: 2021-01-01

## 2021-01-01 RX ORDER — ENOXAPARIN SODIUM 100 MG/ML
40 INJECTION SUBCUTANEOUS DAILY
Refills: 0 | Status: DISCONTINUED | OUTPATIENT
Start: 2021-01-01 | End: 2021-01-01

## 2021-01-01 RX ORDER — ACETAMINOPHEN 500 MG
650 TABLET ORAL EVERY 4 HOURS
Refills: 0 | Status: DISCONTINUED | OUTPATIENT
Start: 2021-01-01 | End: 2021-01-01

## 2021-01-01 RX ORDER — DEXAMETHASONE 0.5 MG/5ML
6 ELIXIR ORAL ONCE
Refills: 0 | Status: COMPLETED | OUTPATIENT
Start: 2021-01-01 | End: 2021-01-01

## 2021-01-01 RX ORDER — PANTOPRAZOLE SODIUM 20 MG/1
40 TABLET, DELAYED RELEASE ORAL DAILY
Refills: 0 | Status: DISCONTINUED | OUTPATIENT
Start: 2021-01-01 | End: 2021-01-01

## 2021-01-01 RX ORDER — FUROSEMIDE 40 MG
20 TABLET ORAL ONCE
Refills: 0 | Status: COMPLETED | OUTPATIENT
Start: 2021-01-01 | End: 2021-01-01

## 2021-01-01 RX ORDER — CARVEDILOL PHOSPHATE 80 MG/1
12.5 CAPSULE, EXTENDED RELEASE ORAL EVERY 12 HOURS
Refills: 0 | Status: DISCONTINUED | OUTPATIENT
Start: 2021-01-01 | End: 2021-01-01

## 2021-01-01 RX ORDER — ASPIRIN/CALCIUM CARB/MAGNESIUM 324 MG
1 TABLET ORAL
Qty: 0 | Refills: 0 | DISCHARGE

## 2021-01-01 RX ORDER — APIXABAN 2.5 MG/1
5 TABLET, FILM COATED ORAL EVERY 12 HOURS
Refills: 0 | Status: DISCONTINUED | OUTPATIENT
Start: 2021-01-01 | End: 2021-01-01

## 2021-01-01 RX ORDER — ASCORBIC ACID 60 MG
500 TABLET,CHEWABLE ORAL DAILY
Refills: 0 | Status: DISCONTINUED | OUTPATIENT
Start: 2021-01-01 | End: 2021-01-01

## 2021-01-01 RX ORDER — DEXAMETHASONE 0.5 MG/5ML
6 ELIXIR ORAL DAILY
Refills: 0 | Status: COMPLETED | OUTPATIENT
Start: 2021-01-01 | End: 2021-01-01

## 2021-01-01 RX ORDER — MIDAZOLAM HYDROCHLORIDE 1 MG/ML
0.02 INJECTION, SOLUTION INTRAMUSCULAR; INTRAVENOUS
Qty: 100 | Refills: 0 | Status: DISCONTINUED | OUTPATIENT
Start: 2021-01-01 | End: 2021-01-01

## 2021-01-01 RX ORDER — FENTANYL CITRATE 50 UG/ML
50 INJECTION INTRAVENOUS ONCE
Refills: 0 | Status: DISCONTINUED | OUTPATIENT
Start: 2021-01-01 | End: 2021-01-01

## 2021-01-01 RX ORDER — ACETAMINOPHEN 500 MG
650 TABLET ORAL ONCE
Refills: 0 | Status: COMPLETED | OUTPATIENT
Start: 2021-01-01 | End: 2021-01-01

## 2021-01-01 RX ORDER — ALBUTEROL 90 UG/1
2 AEROSOL, METERED ORAL EVERY 4 HOURS
Refills: 0 | Status: DISCONTINUED | OUTPATIENT
Start: 2021-01-01 | End: 2021-01-01

## 2021-01-01 RX ORDER — VASOPRESSIN 20 [USP'U]/ML
0.02 INJECTION INTRAVENOUS
Qty: 1 | Refills: 0 | Status: DISCONTINUED | OUTPATIENT
Start: 2021-01-01 | End: 2021-01-01

## 2021-01-01 RX ORDER — CARVEDILOL PHOSPHATE 80 MG/1
1 CAPSULE, EXTENDED RELEASE ORAL
Qty: 0 | Refills: 0 | DISCHARGE

## 2021-01-01 RX ORDER — CARVEDILOL PHOSPHATE 80 MG/1
25 CAPSULE, EXTENDED RELEASE ORAL EVERY 12 HOURS
Refills: 0 | Status: DISCONTINUED | OUTPATIENT
Start: 2021-01-01 | End: 2021-01-01

## 2021-01-01 RX ORDER — CHOLECALCIFEROL (VITAMIN D3) 125 MCG
1 CAPSULE ORAL
Qty: 0 | Refills: 0 | DISCHARGE

## 2021-01-01 RX ORDER — REMDESIVIR 5 MG/ML
100 INJECTION INTRAVENOUS EVERY 24 HOURS
Refills: 0 | Status: COMPLETED | OUTPATIENT
Start: 2021-01-01 | End: 2021-01-01

## 2021-01-01 RX ORDER — CEFEPIME 1 G/1
2000 INJECTION, POWDER, FOR SOLUTION INTRAMUSCULAR; INTRAVENOUS EVERY 12 HOURS
Refills: 0 | Status: DISCONTINUED | OUTPATIENT
Start: 2021-01-01 | End: 2021-01-01

## 2021-01-01 RX ORDER — ALBUTEROL 90 UG/1
2 AEROSOL, METERED ORAL
Qty: 0 | Refills: 0 | DISCHARGE

## 2021-01-01 RX ORDER — SODIUM CHLORIDE 9 MG/ML
1000 INJECTION INTRAMUSCULAR; INTRAVENOUS; SUBCUTANEOUS ONCE
Refills: 0 | Status: COMPLETED | OUTPATIENT
Start: 2021-01-01 | End: 2021-01-01

## 2021-01-01 RX ORDER — SENNA PLUS 8.6 MG/1
2 TABLET ORAL AT BEDTIME
Refills: 0 | Status: DISCONTINUED | OUTPATIENT
Start: 2021-01-01 | End: 2021-01-01

## 2021-01-01 RX ORDER — CHLORHEXIDINE GLUCONATE 213 G/1000ML
15 SOLUTION TOPICAL EVERY 12 HOURS
Refills: 0 | Status: DISCONTINUED | OUTPATIENT
Start: 2021-01-01 | End: 2021-01-01

## 2021-01-01 RX ORDER — REMDESIVIR 5 MG/ML
INJECTION INTRAVENOUS
Refills: 0 | Status: COMPLETED | OUTPATIENT
Start: 2021-01-01 | End: 2021-01-01

## 2021-01-01 RX ORDER — BUDESONIDE AND FORMOTEROL FUMARATE DIHYDRATE 160; 4.5 UG/1; UG/1
2 AEROSOL RESPIRATORY (INHALATION)
Refills: 0 | Status: DISCONTINUED | OUTPATIENT
Start: 2021-01-01 | End: 2021-01-01

## 2021-01-01 RX ORDER — PROPOFOL 10 MG/ML
30 INJECTION, EMULSION INTRAVENOUS ONCE
Refills: 0 | Status: COMPLETED | OUTPATIENT
Start: 2021-01-01 | End: 2021-01-01

## 2021-01-01 RX ORDER — SODIUM CHLORIDE 9 MG/ML
500 INJECTION INTRAMUSCULAR; INTRAVENOUS; SUBCUTANEOUS ONCE
Refills: 0 | Status: COMPLETED | OUTPATIENT
Start: 2021-01-01 | End: 2021-01-01

## 2021-01-01 RX ORDER — REMDESIVIR 5 MG/ML
200 INJECTION INTRAVENOUS EVERY 24 HOURS
Refills: 0 | Status: COMPLETED | OUTPATIENT
Start: 2021-01-01 | End: 2021-01-01

## 2021-01-01 RX ORDER — FENTANYL CITRATE 50 UG/ML
50 INJECTION INTRAVENOUS
Refills: 0 | Status: DISCONTINUED | OUTPATIENT
Start: 2021-01-01 | End: 2021-01-01

## 2021-01-01 RX ORDER — ONDANSETRON 8 MG/1
4 TABLET, FILM COATED ORAL EVERY 6 HOURS
Refills: 0 | Status: DISCONTINUED | OUTPATIENT
Start: 2021-01-01 | End: 2021-01-01

## 2021-01-01 RX ORDER — TOCILIZUMAB 20 MG/ML
400 INJECTION, SOLUTION, CONCENTRATE INTRAVENOUS ONCE
Refills: 0 | Status: COMPLETED | OUTPATIENT
Start: 2021-01-01 | End: 2021-01-01

## 2021-01-01 RX ORDER — PANTOPRAZOLE SODIUM 20 MG/1
40 TABLET, DELAYED RELEASE ORAL EVERY 12 HOURS
Refills: 0 | Status: DISCONTINUED | OUTPATIENT
Start: 2021-01-01 | End: 2021-01-01

## 2021-01-01 RX ORDER — DEXMEDETOMIDINE HYDROCHLORIDE IN 0.9% SODIUM CHLORIDE 4 UG/ML
0.7 INJECTION INTRAVENOUS
Qty: 400 | Refills: 0 | Status: DISCONTINUED | OUTPATIENT
Start: 2021-01-01 | End: 2021-01-01

## 2021-01-01 RX ORDER — PHENYLEPHRINE HYDROCHLORIDE 10 MG/ML
1 INJECTION INTRAVENOUS
Qty: 40 | Refills: 0 | Status: DISCONTINUED | OUTPATIENT
Start: 2021-01-01 | End: 2021-01-01

## 2021-01-01 RX ORDER — BUDESONIDE AND FORMOTEROL FUMARATE DIHYDRATE 160; 4.5 UG/1; UG/1
2 AEROSOL RESPIRATORY (INHALATION)
Qty: 0 | Refills: 0 | DISCHARGE

## 2021-01-01 RX ORDER — VASOPRESSIN 20 [USP'U]/ML
0.02 INJECTION INTRAVENOUS
Qty: 50 | Refills: 0 | Status: DISCONTINUED | OUTPATIENT
Start: 2021-01-01 | End: 2021-01-01

## 2021-01-01 RX ORDER — ZINC SULFATE TAB 220 MG (50 MG ZINC EQUIVALENT) 220 (50 ZN) MG
220 TAB ORAL DAILY
Refills: 0 | Status: DISCONTINUED | OUTPATIENT
Start: 2021-01-01 | End: 2021-01-01

## 2021-01-01 RX ORDER — LANOLIN ALCOHOL/MO/W.PET/CERES
5 CREAM (GRAM) TOPICAL ONCE
Refills: 0 | Status: COMPLETED | OUTPATIENT
Start: 2021-01-01 | End: 2021-01-01

## 2021-01-01 RX ORDER — METOPROLOL TARTRATE 50 MG
5 TABLET ORAL ONCE
Refills: 0 | Status: COMPLETED | OUTPATIENT
Start: 2021-01-01 | End: 2021-01-01

## 2021-01-01 RX ORDER — ETOMIDATE 2 MG/ML
20 INJECTION INTRAVENOUS ONCE
Refills: 0 | Status: COMPLETED | OUTPATIENT
Start: 2021-01-01 | End: 2021-01-01

## 2021-01-01 RX ORDER — SODIUM CHLORIDE 9 MG/ML
1000 INJECTION INTRAMUSCULAR; INTRAVENOUS; SUBCUTANEOUS
Refills: 0 | Status: DISCONTINUED | OUTPATIENT
Start: 2021-01-01 | End: 2021-01-01

## 2021-01-01 RX ORDER — DOXAZOSIN MESYLATE 4 MG
2 TABLET ORAL AT BEDTIME
Refills: 0 | Status: DISCONTINUED | OUTPATIENT
Start: 2021-01-01 | End: 2021-01-01

## 2021-01-01 RX ORDER — GUAIFENESIN/DEXTROMETHORPHAN 600MG-30MG
10 TABLET, EXTENDED RELEASE 12 HR ORAL EVERY 4 HOURS
Refills: 0 | Status: DISCONTINUED | OUTPATIENT
Start: 2021-01-01 | End: 2021-01-01

## 2021-01-01 RX ORDER — POLYETHYLENE GLYCOL 3350 17 G/17G
17 POWDER, FOR SOLUTION ORAL DAILY
Refills: 0 | Status: DISCONTINUED | OUTPATIENT
Start: 2021-01-01 | End: 2021-01-01

## 2021-01-01 RX ORDER — NOREPINEPHRINE BITARTRATE/D5W 8 MG/250ML
0.05 PLASTIC BAG, INJECTION (ML) INTRAVENOUS
Qty: 16 | Refills: 0 | Status: DISCONTINUED | OUTPATIENT
Start: 2021-01-01 | End: 2021-01-01

## 2021-01-01 RX ORDER — PROPOFOL 10 MG/ML
10 INJECTION, EMULSION INTRAVENOUS
Qty: 1000 | Refills: 0 | Status: DISCONTINUED | OUTPATIENT
Start: 2021-01-01 | End: 2021-01-01

## 2021-01-01 RX ORDER — ATORVASTATIN CALCIUM 80 MG/1
40 TABLET, FILM COATED ORAL AT BEDTIME
Refills: 0 | Status: DISCONTINUED | OUTPATIENT
Start: 2021-01-01 | End: 2021-01-01

## 2021-01-01 RX ADMIN — FENTANYL CITRATE 50 MICROGRAM(S): 50 INJECTION INTRAVENOUS at 06:00

## 2021-01-01 RX ADMIN — CARVEDILOL PHOSPHATE 12.5 MILLIGRAM(S): 80 CAPSULE, EXTENDED RELEASE ORAL at 09:59

## 2021-01-01 RX ADMIN — APIXABAN 5 MILLIGRAM(S): 2.5 TABLET, FILM COATED ORAL at 21:00

## 2021-01-01 RX ADMIN — Medication 650 MILLIGRAM(S): at 11:15

## 2021-01-01 RX ADMIN — Medication 650 MILLIGRAM(S): at 00:15

## 2021-01-01 RX ADMIN — APIXABAN 5 MILLIGRAM(S): 2.5 TABLET, FILM COATED ORAL at 09:41

## 2021-01-01 RX ADMIN — PANTOPRAZOLE SODIUM 40 MILLIGRAM(S): 20 TABLET, DELAYED RELEASE ORAL at 21:24

## 2021-01-01 RX ADMIN — FENTANYL CITRATE 50 MICROGRAM(S): 50 INJECTION INTRAVENOUS at 00:52

## 2021-01-01 RX ADMIN — Medication 81 MILLIGRAM(S): at 09:55

## 2021-01-01 RX ADMIN — CARVEDILOL PHOSPHATE 25 MILLIGRAM(S): 80 CAPSULE, EXTENDED RELEASE ORAL at 10:25

## 2021-01-01 RX ADMIN — PHENYLEPHRINE HYDROCHLORIDE 22.3 MICROGRAM(S)/KG/MIN: 10 INJECTION INTRAVENOUS at 16:46

## 2021-01-01 RX ADMIN — PROPOFOL 3.56 MICROGRAM(S)/KG/MIN: 10 INJECTION, EMULSION INTRAVENOUS at 15:45

## 2021-01-01 RX ADMIN — POLYETHYLENE GLYCOL 3350 17 GRAM(S): 17 POWDER, FOR SOLUTION ORAL at 09:58

## 2021-01-01 RX ADMIN — CEFEPIME 100 MILLIGRAM(S): 1 INJECTION, POWDER, FOR SOLUTION INTRAMUSCULAR; INTRAVENOUS at 21:24

## 2021-01-01 RX ADMIN — Medication 2.5 MILLIGRAM(S): at 09:41

## 2021-01-01 RX ADMIN — FENTANYL CITRATE 50 MICROGRAM(S): 50 INJECTION INTRAVENOUS at 11:27

## 2021-01-01 RX ADMIN — CARVEDILOL PHOSPHATE 25 MILLIGRAM(S): 80 CAPSULE, EXTENDED RELEASE ORAL at 09:41

## 2021-01-01 RX ADMIN — CARVEDILOL PHOSPHATE 25 MILLIGRAM(S): 80 CAPSULE, EXTENDED RELEASE ORAL at 21:06

## 2021-01-01 RX ADMIN — CEFEPIME 100 MILLIGRAM(S): 1 INJECTION, POWDER, FOR SOLUTION INTRAMUSCULAR; INTRAVENOUS at 10:27

## 2021-01-01 RX ADMIN — REMDESIVIR 540 MILLIGRAM(S): 5 INJECTION INTRAVENOUS at 10:58

## 2021-01-01 RX ADMIN — CEFEPIME 100 MILLIGRAM(S): 1 INJECTION, POWDER, FOR SOLUTION INTRAMUSCULAR; INTRAVENOUS at 09:50

## 2021-01-01 RX ADMIN — PROPOFOL 30 MILLIGRAM(S): 10 INJECTION, EMULSION INTRAVENOUS at 20:51

## 2021-01-01 RX ADMIN — APIXABAN 5 MILLIGRAM(S): 2.5 TABLET, FILM COATED ORAL at 09:59

## 2021-01-01 RX ADMIN — ZINC SULFATE TAB 220 MG (50 MG ZINC EQUIVALENT) 220 MILLIGRAM(S): 220 (50 ZN) TAB at 10:25

## 2021-01-01 RX ADMIN — REMDESIVIR 540 MILLIGRAM(S): 5 INJECTION INTRAVENOUS at 11:43

## 2021-01-01 RX ADMIN — PROPOFOL 3.56 MICROGRAM(S)/KG/MIN: 10 INJECTION, EMULSION INTRAVENOUS at 17:47

## 2021-01-01 RX ADMIN — BUDESONIDE AND FORMOTEROL FUMARATE DIHYDRATE 2 PUFF(S): 160; 4.5 AEROSOL RESPIRATORY (INHALATION) at 08:26

## 2021-01-01 RX ADMIN — SODIUM CHLORIDE 75 MILLILITER(S): 9 INJECTION INTRAMUSCULAR; INTRAVENOUS; SUBCUTANEOUS at 17:31

## 2021-01-01 RX ADMIN — Medication 650 MILLIGRAM(S): at 19:00

## 2021-01-01 RX ADMIN — Medication 6 MILLIGRAM(S): at 10:57

## 2021-01-01 RX ADMIN — CHLORHEXIDINE GLUCONATE 15 MILLILITER(S): 213 SOLUTION TOPICAL at 05:53

## 2021-01-01 RX ADMIN — Medication 81 MILLIGRAM(S): at 09:41

## 2021-01-01 RX ADMIN — CHLORHEXIDINE GLUCONATE 15 MILLILITER(S): 213 SOLUTION TOPICAL at 21:01

## 2021-01-01 RX ADMIN — PHENYLEPHRINE HYDROCHLORIDE 22.3 MICROGRAM(S)/KG/MIN: 10 INJECTION INTRAVENOUS at 17:30

## 2021-01-01 RX ADMIN — FENTANYL CITRATE 50 MICROGRAM(S): 50 INJECTION INTRAVENOUS at 16:25

## 2021-01-01 RX ADMIN — CEFEPIME 100 MILLIGRAM(S): 1 INJECTION, POWDER, FOR SOLUTION INTRAMUSCULAR; INTRAVENOUS at 10:02

## 2021-01-01 RX ADMIN — APIXABAN 5 MILLIGRAM(S): 2.5 TABLET, FILM COATED ORAL at 21:06

## 2021-01-01 RX ADMIN — CHLORHEXIDINE GLUCONATE 15 MILLILITER(S): 213 SOLUTION TOPICAL at 18:13

## 2021-01-01 RX ADMIN — Medication 2 MILLIGRAM(S): at 22:23

## 2021-01-01 RX ADMIN — Medication 81 MILLIGRAM(S): at 12:23

## 2021-01-01 RX ADMIN — Medication 6 MILLIGRAM(S): at 10:10

## 2021-01-01 RX ADMIN — BUDESONIDE AND FORMOTEROL FUMARATE DIHYDRATE 2 PUFF(S): 160; 4.5 AEROSOL RESPIRATORY (INHALATION) at 20:43

## 2021-01-01 RX ADMIN — PHENYLEPHRINE HYDROCHLORIDE 22.3 MICROGRAM(S)/KG/MIN: 10 INJECTION INTRAVENOUS at 14:45

## 2021-01-01 RX ADMIN — Medication 2 MILLIGRAM(S): at 13:59

## 2021-01-01 RX ADMIN — CEFEPIME 100 MILLIGRAM(S): 1 INJECTION, POWDER, FOR SOLUTION INTRAMUSCULAR; INTRAVENOUS at 21:25

## 2021-01-01 RX ADMIN — BUDESONIDE AND FORMOTEROL FUMARATE DIHYDRATE 2 PUFF(S): 160; 4.5 AEROSOL RESPIRATORY (INHALATION) at 09:01

## 2021-01-01 RX ADMIN — Medication 6 MILLIGRAM(S): at 09:55

## 2021-01-01 RX ADMIN — APIXABAN 5 MILLIGRAM(S): 2.5 TABLET, FILM COATED ORAL at 21:13

## 2021-01-01 RX ADMIN — FENTANYL CITRATE 50 MICROGRAM(S): 50 INJECTION INTRAVENOUS at 21:24

## 2021-01-01 RX ADMIN — CEFEPIME 100 MILLIGRAM(S): 1 INJECTION, POWDER, FOR SOLUTION INTRAMUSCULAR; INTRAVENOUS at 10:09

## 2021-01-01 RX ADMIN — Medication 81 MILLIGRAM(S): at 09:58

## 2021-01-01 RX ADMIN — PANTOPRAZOLE SODIUM 40 MILLIGRAM(S): 20 TABLET, DELAYED RELEASE ORAL at 09:55

## 2021-01-01 RX ADMIN — APIXABAN 5 MILLIGRAM(S): 2.5 TABLET, FILM COATED ORAL at 10:25

## 2021-01-01 RX ADMIN — Medication 650 MILLIGRAM(S): at 18:09

## 2021-01-01 RX ADMIN — ATORVASTATIN CALCIUM 40 MILLIGRAM(S): 80 TABLET, FILM COATED ORAL at 21:25

## 2021-01-01 RX ADMIN — CARVEDILOL PHOSPHATE 12.5 MILLIGRAM(S): 80 CAPSULE, EXTENDED RELEASE ORAL at 10:55

## 2021-01-01 RX ADMIN — SODIUM CHLORIDE 75 MILLILITER(S): 9 INJECTION INTRAMUSCULAR; INTRAVENOUS; SUBCUTANEOUS at 05:20

## 2021-01-01 RX ADMIN — APIXABAN 5 MILLIGRAM(S): 2.5 TABLET, FILM COATED ORAL at 10:57

## 2021-01-01 RX ADMIN — BUDESONIDE AND FORMOTEROL FUMARATE DIHYDRATE 2 PUFF(S): 160; 4.5 AEROSOL RESPIRATORY (INHALATION) at 20:14

## 2021-01-01 RX ADMIN — PHENYLEPHRINE HYDROCHLORIDE 22.3 MICROGRAM(S)/KG/MIN: 10 INJECTION INTRAVENOUS at 02:08

## 2021-01-01 RX ADMIN — APIXABAN 5 MILLIGRAM(S): 2.5 TABLET, FILM COATED ORAL at 09:50

## 2021-01-01 RX ADMIN — CARVEDILOL PHOSPHATE 12.5 MILLIGRAM(S): 80 CAPSULE, EXTENDED RELEASE ORAL at 21:25

## 2021-01-01 RX ADMIN — REMDESIVIR 540 MILLIGRAM(S): 5 INJECTION INTRAVENOUS at 09:58

## 2021-01-01 RX ADMIN — FENTANYL CITRATE 50 MICROGRAM(S): 50 INJECTION INTRAVENOUS at 15:45

## 2021-01-01 RX ADMIN — FENTANYL CITRATE 50 MICROGRAM(S): 50 INJECTION INTRAVENOUS at 03:06

## 2021-01-01 RX ADMIN — PANTOPRAZOLE SODIUM 40 MILLIGRAM(S): 20 TABLET, DELAYED RELEASE ORAL at 09:42

## 2021-01-01 RX ADMIN — Medication 2 MILLIGRAM(S): at 15:19

## 2021-01-01 RX ADMIN — Medication 650 MILLIGRAM(S): at 13:26

## 2021-01-01 RX ADMIN — SODIUM CHLORIDE 75 MILLILITER(S): 9 INJECTION INTRAMUSCULAR; INTRAVENOUS; SUBCUTANEOUS at 02:38

## 2021-01-01 RX ADMIN — Medication 10 MILLILITER(S): at 11:20

## 2021-01-01 RX ADMIN — BUDESONIDE AND FORMOTEROL FUMARATE DIHYDRATE 2 PUFF(S): 160; 4.5 AEROSOL RESPIRATORY (INHALATION) at 20:41

## 2021-01-01 RX ADMIN — PANTOPRAZOLE SODIUM 40 MILLIGRAM(S): 20 TABLET, DELAYED RELEASE ORAL at 10:05

## 2021-01-01 RX ADMIN — REMDESIVIR 540 MILLIGRAM(S): 5 INJECTION INTRAVENOUS at 12:23

## 2021-01-01 RX ADMIN — PANTOPRAZOLE SODIUM 40 MILLIGRAM(S): 20 TABLET, DELAYED RELEASE ORAL at 21:07

## 2021-01-01 RX ADMIN — Medication 650 MILLIGRAM(S): at 05:07

## 2021-01-01 RX ADMIN — MIDAZOLAM HYDROCHLORIDE 1.19 MG/KG/HR: 1 INJECTION, SOLUTION INTRAMUSCULAR; INTRAVENOUS at 09:49

## 2021-01-01 RX ADMIN — CHLORHEXIDINE GLUCONATE 15 MILLILITER(S): 213 SOLUTION TOPICAL at 17:52

## 2021-01-01 RX ADMIN — FENTANYL CITRATE 50 MICROGRAM(S): 50 INJECTION INTRAVENOUS at 22:30

## 2021-01-01 RX ADMIN — SODIUM CHLORIDE 500 MILLILITER(S): 9 INJECTION INTRAMUSCULAR; INTRAVENOUS; SUBCUTANEOUS at 13:57

## 2021-01-01 RX ADMIN — Medication 650 MILLIGRAM(S): at 05:03

## 2021-01-01 RX ADMIN — Medication 650 MILLIGRAM(S): at 18:13

## 2021-01-01 RX ADMIN — Medication 650 MILLIGRAM(S): at 14:00

## 2021-01-01 RX ADMIN — Medication 1 MILLIGRAM(S): at 10:10

## 2021-01-01 RX ADMIN — FENTANYL CITRATE 50 MICROGRAM(S): 50 INJECTION INTRAVENOUS at 11:12

## 2021-01-01 RX ADMIN — PHENYLEPHRINE HYDROCHLORIDE 22.3 MICROGRAM(S)/KG/MIN: 10 INJECTION INTRAVENOUS at 05:20

## 2021-01-01 RX ADMIN — Medication 2.78 MICROGRAM(S)/KG/MIN: at 10:00

## 2021-01-01 RX ADMIN — BUDESONIDE AND FORMOTEROL FUMARATE DIHYDRATE 2 PUFF(S): 160; 4.5 AEROSOL RESPIRATORY (INHALATION) at 20:54

## 2021-01-01 RX ADMIN — REMDESIVIR 540 MILLIGRAM(S): 5 INJECTION INTRAVENOUS at 10:53

## 2021-01-01 RX ADMIN — CARVEDILOL PHOSPHATE 25 MILLIGRAM(S): 80 CAPSULE, EXTENDED RELEASE ORAL at 22:23

## 2021-01-01 RX ADMIN — Medication 2.78 MICROGRAM(S)/KG/MIN: at 15:36

## 2021-01-01 RX ADMIN — Medication 6 MILLIGRAM(S): at 12:35

## 2021-01-01 RX ADMIN — APIXABAN 5 MILLIGRAM(S): 2.5 TABLET, FILM COATED ORAL at 22:23

## 2021-01-01 RX ADMIN — CEFEPIME 100 MILLIGRAM(S): 1 INJECTION, POWDER, FOR SOLUTION INTRAMUSCULAR; INTRAVENOUS at 21:06

## 2021-01-01 RX ADMIN — Medication 6 MILLIGRAM(S): at 09:36

## 2021-01-01 RX ADMIN — PHENYLEPHRINE HYDROCHLORIDE 22.3 MICROGRAM(S)/KG/MIN: 10 INJECTION INTRAVENOUS at 08:45

## 2021-01-01 RX ADMIN — CEFEPIME 100 MILLIGRAM(S): 1 INJECTION, POWDER, FOR SOLUTION INTRAMUSCULAR; INTRAVENOUS at 22:23

## 2021-01-01 RX ADMIN — CEFEPIME 100 MILLIGRAM(S): 1 INJECTION, POWDER, FOR SOLUTION INTRAMUSCULAR; INTRAVENOUS at 09:58

## 2021-01-01 RX ADMIN — REMDESIVIR 540 MILLIGRAM(S): 5 INJECTION INTRAVENOUS at 10:59

## 2021-01-01 RX ADMIN — ETOMIDATE 20 MILLIGRAM(S): 2 INJECTION INTRAVENOUS at 20:51

## 2021-01-01 RX ADMIN — MIDAZOLAM HYDROCHLORIDE 1.19 MG/KG/HR: 1 INJECTION, SOLUTION INTRAMUSCULAR; INTRAVENOUS at 23:19

## 2021-01-01 RX ADMIN — Medication 650 MILLIGRAM(S): at 12:35

## 2021-01-01 RX ADMIN — REMDESIVIR 540 MILLIGRAM(S): 5 INJECTION INTRAVENOUS at 09:59

## 2021-01-01 RX ADMIN — CARVEDILOL PHOSPHATE 12.5 MILLIGRAM(S): 80 CAPSULE, EXTENDED RELEASE ORAL at 21:07

## 2021-01-01 RX ADMIN — Medication 650 MILLIGRAM(S): at 18:39

## 2021-01-01 RX ADMIN — Medication 81 MILLIGRAM(S): at 10:56

## 2021-01-01 RX ADMIN — Medication 2.78 MICROGRAM(S)/KG/MIN: at 13:00

## 2021-01-01 RX ADMIN — Medication 2 MILLIGRAM(S): at 21:12

## 2021-01-01 RX ADMIN — FENTANYL CITRATE 50 MICROGRAM(S): 50 INJECTION INTRAVENOUS at 22:23

## 2021-01-01 RX ADMIN — BUDESONIDE AND FORMOTEROL FUMARATE DIHYDRATE 2 PUFF(S): 160; 4.5 AEROSOL RESPIRATORY (INHALATION) at 23:22

## 2021-01-01 RX ADMIN — PHENYLEPHRINE HYDROCHLORIDE 22.3 MICROGRAM(S)/KG/MIN: 10 INJECTION INTRAVENOUS at 17:06

## 2021-01-01 RX ADMIN — FENTANYL CITRATE 50 MICROGRAM(S): 50 INJECTION INTRAVENOUS at 15:30

## 2021-01-01 RX ADMIN — CARVEDILOL PHOSPHATE 25 MILLIGRAM(S): 80 CAPSULE, EXTENDED RELEASE ORAL at 09:51

## 2021-01-01 RX ADMIN — CHLORHEXIDINE GLUCONATE 15 MILLILITER(S): 213 SOLUTION TOPICAL at 18:01

## 2021-01-01 RX ADMIN — Medication 650 MILLIGRAM(S): at 23:19

## 2021-01-01 RX ADMIN — PROPOFOL 3.56 MICROGRAM(S)/KG/MIN: 10 INJECTION, EMULSION INTRAVENOUS at 19:28

## 2021-01-01 RX ADMIN — ENOXAPARIN SODIUM 40 MILLIGRAM(S): 100 INJECTION SUBCUTANEOUS at 22:37

## 2021-01-01 RX ADMIN — FENTANYL CITRATE 50 MICROGRAM(S): 50 INJECTION INTRAVENOUS at 01:48

## 2021-01-01 RX ADMIN — Medication 5 MILLIGRAM(S): at 18:27

## 2021-01-01 RX ADMIN — CHLORHEXIDINE GLUCONATE 15 MILLILITER(S): 213 SOLUTION TOPICAL at 18:29

## 2021-01-01 RX ADMIN — Medication 6 MILLIGRAM(S): at 10:05

## 2021-01-01 RX ADMIN — Medication 2 MILLIGRAM(S): at 21:24

## 2021-01-01 RX ADMIN — CHLORHEXIDINE GLUCONATE 15 MILLILITER(S): 213 SOLUTION TOPICAL at 17:47

## 2021-01-01 RX ADMIN — FENTANYL CITRATE 50 MICROGRAM(S): 50 INJECTION INTRAVENOUS at 02:03

## 2021-01-01 RX ADMIN — CHLORHEXIDINE GLUCONATE 15 MILLILITER(S): 213 SOLUTION TOPICAL at 05:58

## 2021-01-01 RX ADMIN — Medication 650 MILLIGRAM(S): at 05:42

## 2021-01-01 RX ADMIN — CHLORHEXIDINE GLUCONATE 15 MILLILITER(S): 213 SOLUTION TOPICAL at 18:08

## 2021-01-01 RX ADMIN — Medication 81 MILLIGRAM(S): at 10:25

## 2021-01-01 RX ADMIN — Medication 650 MILLIGRAM(S): at 11:30

## 2021-01-01 RX ADMIN — CARVEDILOL PHOSPHATE 12.5 MILLIGRAM(S): 80 CAPSULE, EXTENDED RELEASE ORAL at 09:43

## 2021-01-01 RX ADMIN — POLYETHYLENE GLYCOL 3350 17 GRAM(S): 17 POWDER, FOR SOLUTION ORAL at 10:09

## 2021-01-01 RX ADMIN — PANTOPRAZOLE SODIUM 40 MILLIGRAM(S): 20 TABLET, DELAYED RELEASE ORAL at 10:21

## 2021-01-01 RX ADMIN — CARVEDILOL PHOSPHATE 12.5 MILLIGRAM(S): 80 CAPSULE, EXTENDED RELEASE ORAL at 21:36

## 2021-01-01 RX ADMIN — POLYETHYLENE GLYCOL 3350 17 GRAM(S): 17 POWDER, FOR SOLUTION ORAL at 09:56

## 2021-01-01 RX ADMIN — ATORVASTATIN CALCIUM 40 MILLIGRAM(S): 80 TABLET, FILM COATED ORAL at 21:36

## 2021-01-01 RX ADMIN — CARVEDILOL PHOSPHATE 12.5 MILLIGRAM(S): 80 CAPSULE, EXTENDED RELEASE ORAL at 21:13

## 2021-01-01 RX ADMIN — Medication 650 MILLIGRAM(S): at 18:40

## 2021-01-01 RX ADMIN — Medication 650 MILLIGRAM(S): at 06:41

## 2021-01-01 RX ADMIN — MIDAZOLAM HYDROCHLORIDE 1.19 MG/KG/HR: 1 INJECTION, SOLUTION INTRAMUSCULAR; INTRAVENOUS at 04:18

## 2021-01-01 RX ADMIN — Medication 650 MILLIGRAM(S): at 18:28

## 2021-01-01 RX ADMIN — FENTANYL CITRATE 50 MICROGRAM(S): 50 INJECTION INTRAVENOUS at 05:33

## 2021-01-01 RX ADMIN — CARVEDILOL PHOSPHATE 12.5 MILLIGRAM(S): 80 CAPSULE, EXTENDED RELEASE ORAL at 21:01

## 2021-01-01 RX ADMIN — CHLORHEXIDINE GLUCONATE 15 MILLILITER(S): 213 SOLUTION TOPICAL at 05:24

## 2021-01-01 RX ADMIN — PROPOFOL 3.56 MICROGRAM(S)/KG/MIN: 10 INJECTION, EMULSION INTRAVENOUS at 21:13

## 2021-01-01 RX ADMIN — APIXABAN 5 MILLIGRAM(S): 2.5 TABLET, FILM COATED ORAL at 21:24

## 2021-01-01 RX ADMIN — BUDESONIDE AND FORMOTEROL FUMARATE DIHYDRATE 2 PUFF(S): 160; 4.5 AEROSOL RESPIRATORY (INHALATION) at 20:11

## 2021-01-01 RX ADMIN — CHLORHEXIDINE GLUCONATE 15 MILLILITER(S): 213 SOLUTION TOPICAL at 04:17

## 2021-01-01 RX ADMIN — CHLORHEXIDINE GLUCONATE 15 MILLILITER(S): 213 SOLUTION TOPICAL at 05:41

## 2021-01-01 RX ADMIN — APIXABAN 5 MILLIGRAM(S): 2.5 TABLET, FILM COATED ORAL at 21:25

## 2021-01-01 RX ADMIN — CHLORHEXIDINE GLUCONATE 15 MILLILITER(S): 213 SOLUTION TOPICAL at 05:04

## 2021-01-01 RX ADMIN — BUDESONIDE AND FORMOTEROL FUMARATE DIHYDRATE 2 PUFF(S): 160; 4.5 AEROSOL RESPIRATORY (INHALATION) at 08:44

## 2021-01-01 RX ADMIN — BUDESONIDE AND FORMOTEROL FUMARATE DIHYDRATE 2 PUFF(S): 160; 4.5 AEROSOL RESPIRATORY (INHALATION) at 11:06

## 2021-01-01 RX ADMIN — APIXABAN 5 MILLIGRAM(S): 2.5 TABLET, FILM COATED ORAL at 10:10

## 2021-01-01 RX ADMIN — Medication 6 MILLIGRAM(S): at 09:59

## 2021-01-01 RX ADMIN — Medication 650 MILLIGRAM(S): at 17:30

## 2021-01-01 RX ADMIN — REMDESIVIR 540 MILLIGRAM(S): 5 INJECTION INTRAVENOUS at 13:06

## 2021-01-01 RX ADMIN — Medication 2.78 MICROGRAM(S)/KG/MIN: at 02:38

## 2021-01-01 RX ADMIN — FENTANYL CITRATE 50 MICROGRAM(S): 50 INJECTION INTRAVENOUS at 03:36

## 2021-01-01 RX ADMIN — FENTANYL CITRATE 50 MICROGRAM(S): 50 INJECTION INTRAVENOUS at 17:29

## 2021-01-01 RX ADMIN — Medication 650 MILLIGRAM(S): at 00:19

## 2021-01-01 RX ADMIN — PROPOFOL 3.56 MICROGRAM(S)/KG/MIN: 10 INJECTION, EMULSION INTRAVENOUS at 07:59

## 2021-01-01 RX ADMIN — Medication 6 MILLIGRAM(S): at 09:51

## 2021-01-01 RX ADMIN — FENTANYL CITRATE 50 MICROGRAM(S): 50 INJECTION INTRAVENOUS at 00:37

## 2021-01-01 RX ADMIN — Medication 81 MILLIGRAM(S): at 09:43

## 2021-01-01 RX ADMIN — VASOPRESSIN 1.2 UNIT(S)/MIN: 20 INJECTION INTRAVENOUS at 10:50

## 2021-01-01 RX ADMIN — Medication 500 MILLIGRAM(S): at 10:27

## 2021-01-01 RX ADMIN — BUDESONIDE AND FORMOTEROL FUMARATE DIHYDRATE 2 PUFF(S): 160; 4.5 AEROSOL RESPIRATORY (INHALATION) at 19:53

## 2021-01-01 RX ADMIN — PANTOPRAZOLE SODIUM 40 MILLIGRAM(S): 20 TABLET, DELAYED RELEASE ORAL at 10:25

## 2021-01-01 RX ADMIN — PROPOFOL 3.56 MICROGRAM(S)/KG/MIN: 10 INJECTION, EMULSION INTRAVENOUS at 13:58

## 2021-01-01 RX ADMIN — DEXMEDETOMIDINE HYDROCHLORIDE IN 0.9% SODIUM CHLORIDE 10.4 MICROGRAM(S)/KG/HR: 4 INJECTION INTRAVENOUS at 22:54

## 2021-01-01 RX ADMIN — CARVEDILOL PHOSPHATE 12.5 MILLIGRAM(S): 80 CAPSULE, EXTENDED RELEASE ORAL at 10:05

## 2021-01-01 RX ADMIN — Medication 6 MILLIGRAM(S): at 09:57

## 2021-01-01 RX ADMIN — APIXABAN 5 MILLIGRAM(S): 2.5 TABLET, FILM COATED ORAL at 21:01

## 2021-01-01 RX ADMIN — ATORVASTATIN CALCIUM 40 MILLIGRAM(S): 80 TABLET, FILM COATED ORAL at 21:01

## 2021-01-01 RX ADMIN — MIDAZOLAM HYDROCHLORIDE 1.19 MG/KG/HR: 1 INJECTION, SOLUTION INTRAMUSCULAR; INTRAVENOUS at 18:28

## 2021-01-01 RX ADMIN — PANTOPRAZOLE SODIUM 40 MILLIGRAM(S): 20 TABLET, DELAYED RELEASE ORAL at 09:59

## 2021-01-01 RX ADMIN — PROPOFOL 3.56 MICROGRAM(S)/KG/MIN: 10 INJECTION, EMULSION INTRAVENOUS at 21:00

## 2021-01-01 RX ADMIN — POLYETHYLENE GLYCOL 3350 17 GRAM(S): 17 POWDER, FOR SOLUTION ORAL at 10:02

## 2021-01-01 RX ADMIN — PHENYLEPHRINE HYDROCHLORIDE 22.3 MICROGRAM(S)/KG/MIN: 10 INJECTION INTRAVENOUS at 10:50

## 2021-01-01 RX ADMIN — REMDESIVIR 580 MILLIGRAM(S): 5 INJECTION INTRAVENOUS at 12:20

## 2021-01-01 RX ADMIN — BUDESONIDE AND FORMOTEROL FUMARATE DIHYDRATE 2 PUFF(S): 160; 4.5 AEROSOL RESPIRATORY (INHALATION) at 09:00

## 2021-01-01 RX ADMIN — CHLORHEXIDINE GLUCONATE 15 MILLILITER(S): 213 SOLUTION TOPICAL at 05:20

## 2021-01-01 RX ADMIN — Medication 500 MILLIGRAM(S): at 10:03

## 2021-01-01 RX ADMIN — ZINC SULFATE TAB 220 MG (50 MG ZINC EQUIVALENT) 220 MILLIGRAM(S): 220 (50 ZN) TAB at 10:03

## 2021-01-01 RX ADMIN — Medication 6 MILLIGRAM(S): at 10:22

## 2021-01-01 RX ADMIN — BUDESONIDE AND FORMOTEROL FUMARATE DIHYDRATE 2 PUFF(S): 160; 4.5 AEROSOL RESPIRATORY (INHALATION) at 19:47

## 2021-01-01 RX ADMIN — FENTANYL CITRATE 50 MICROGRAM(S): 50 INJECTION INTRAVENOUS at 21:49

## 2021-01-01 RX ADMIN — Medication 81 MILLIGRAM(S): at 10:10

## 2021-01-01 RX ADMIN — CHLORHEXIDINE GLUCONATE 15 MILLILITER(S): 213 SOLUTION TOPICAL at 17:30

## 2021-01-01 RX ADMIN — APIXABAN 5 MILLIGRAM(S): 2.5 TABLET, FILM COATED ORAL at 23:46

## 2021-01-01 RX ADMIN — CARVEDILOL PHOSPHATE 25 MILLIGRAM(S): 80 CAPSULE, EXTENDED RELEASE ORAL at 21:24

## 2021-01-01 RX ADMIN — CHLORHEXIDINE GLUCONATE 15 MILLILITER(S): 213 SOLUTION TOPICAL at 06:24

## 2021-01-01 RX ADMIN — APIXABAN 5 MILLIGRAM(S): 2.5 TABLET, FILM COATED ORAL at 10:05

## 2021-01-01 RX ADMIN — CARVEDILOL PHOSPHATE 12.5 MILLIGRAM(S): 80 CAPSULE, EXTENDED RELEASE ORAL at 21:00

## 2021-01-01 RX ADMIN — Medication 5 MILLIGRAM(S): at 21:36

## 2021-01-01 RX ADMIN — FENTANYL CITRATE 50 MICROGRAM(S): 50 INJECTION INTRAVENOUS at 00:30

## 2021-01-01 RX ADMIN — APIXABAN 5 MILLIGRAM(S): 2.5 TABLET, FILM COATED ORAL at 10:22

## 2021-01-01 RX ADMIN — Medication 81 MILLIGRAM(S): at 09:59

## 2021-01-01 RX ADMIN — PROPOFOL 3.56 MICROGRAM(S)/KG/MIN: 10 INJECTION, EMULSION INTRAVENOUS at 23:22

## 2021-01-01 RX ADMIN — APIXABAN 5 MILLIGRAM(S): 2.5 TABLET, FILM COATED ORAL at 21:12

## 2021-01-01 RX ADMIN — CARVEDILOL PHOSPHATE 12.5 MILLIGRAM(S): 80 CAPSULE, EXTENDED RELEASE ORAL at 09:57

## 2021-01-01 RX ADMIN — PROPOFOL 3.56 MICROGRAM(S)/KG/MIN: 10 INJECTION, EMULSION INTRAVENOUS at 10:22

## 2021-01-01 RX ADMIN — APIXABAN 5 MILLIGRAM(S): 2.5 TABLET, FILM COATED ORAL at 09:55

## 2021-01-01 RX ADMIN — CEFEPIME 100 MILLIGRAM(S): 1 INJECTION, POWDER, FOR SOLUTION INTRAMUSCULAR; INTRAVENOUS at 09:41

## 2021-01-01 RX ADMIN — PROPOFOL 3.56 MICROGRAM(S)/KG/MIN: 10 INJECTION, EMULSION INTRAVENOUS at 05:03

## 2021-01-01 RX ADMIN — PROPOFOL 3.56 MICROGRAM(S)/KG/MIN: 10 INJECTION, EMULSION INTRAVENOUS at 03:49

## 2021-01-01 RX ADMIN — Medication 2.5 MILLIGRAM(S): at 09:50

## 2021-01-01 RX ADMIN — Medication 650 MILLIGRAM(S): at 23:45

## 2021-01-01 RX ADMIN — SODIUM CHLORIDE 1000 MILLILITER(S): 9 INJECTION INTRAMUSCULAR; INTRAVENOUS; SUBCUTANEOUS at 10:04

## 2021-01-01 RX ADMIN — Medication 650 MILLIGRAM(S): at 11:53

## 2021-01-01 RX ADMIN — CARVEDILOL PHOSPHATE 12.5 MILLIGRAM(S): 80 CAPSULE, EXTENDED RELEASE ORAL at 09:55

## 2021-01-01 RX ADMIN — PROPOFOL 3.56 MICROGRAM(S)/KG/MIN: 10 INJECTION, EMULSION INTRAVENOUS at 09:58

## 2021-01-01 RX ADMIN — SODIUM CHLORIDE 1000 MILLILITER(S): 9 INJECTION INTRAMUSCULAR; INTRAVENOUS; SUBCUTANEOUS at 02:38

## 2021-01-01 RX ADMIN — PANTOPRAZOLE SODIUM 40 MILLIGRAM(S): 20 TABLET, DELAYED RELEASE ORAL at 22:23

## 2021-01-01 RX ADMIN — SENNA PLUS 2 TABLET(S): 8.6 TABLET ORAL at 21:06

## 2021-01-01 RX ADMIN — BUDESONIDE AND FORMOTEROL FUMARATE DIHYDRATE 2 PUFF(S): 160; 4.5 AEROSOL RESPIRATORY (INHALATION) at 08:16

## 2021-01-01 RX ADMIN — PROPOFOL 3.56 MICROGRAM(S)/KG/MIN: 10 INJECTION, EMULSION INTRAVENOUS at 16:42

## 2021-01-01 RX ADMIN — APIXABAN 5 MILLIGRAM(S): 2.5 TABLET, FILM COATED ORAL at 10:04

## 2021-01-01 RX ADMIN — ATORVASTATIN CALCIUM 40 MILLIGRAM(S): 80 TABLET, FILM COATED ORAL at 23:14

## 2021-01-01 RX ADMIN — Medication 20 MILLIGRAM(S): at 15:55

## 2021-01-01 RX ADMIN — ATORVASTATIN CALCIUM 40 MILLIGRAM(S): 80 TABLET, FILM COATED ORAL at 21:00

## 2021-01-01 RX ADMIN — PROPOFOL 3.56 MICROGRAM(S)/KG/MIN: 10 INJECTION, EMULSION INTRAVENOUS at 21:06

## 2021-01-01 RX ADMIN — Medication 2.5 MILLIGRAM(S): at 09:55

## 2021-01-01 RX ADMIN — BUDESONIDE AND FORMOTEROL FUMARATE DIHYDRATE 2 PUFF(S): 160; 4.5 AEROSOL RESPIRATORY (INHALATION) at 20:45

## 2021-01-01 RX ADMIN — Medication 81 MILLIGRAM(S): at 10:03

## 2021-01-01 RX ADMIN — CHLORHEXIDINE GLUCONATE 15 MILLILITER(S): 213 SOLUTION TOPICAL at 17:21

## 2021-01-01 RX ADMIN — BUDESONIDE AND FORMOTEROL FUMARATE DIHYDRATE 2 PUFF(S): 160; 4.5 AEROSOL RESPIRATORY (INHALATION) at 08:30

## 2021-01-01 RX ADMIN — APIXABAN 5 MILLIGRAM(S): 2.5 TABLET, FILM COATED ORAL at 09:43

## 2021-01-01 RX ADMIN — Medication 650 MILLIGRAM(S): at 05:24

## 2021-01-01 RX ADMIN — PANTOPRAZOLE SODIUM 40 MILLIGRAM(S): 20 TABLET, DELAYED RELEASE ORAL at 10:09

## 2021-01-01 RX ADMIN — CEFEPIME 100 MILLIGRAM(S): 1 INJECTION, POWDER, FOR SOLUTION INTRAMUSCULAR; INTRAVENOUS at 21:13

## 2021-01-01 RX ADMIN — PROPOFOL 3.56 MICROGRAM(S)/KG/MIN: 10 INJECTION, EMULSION INTRAVENOUS at 00:47

## 2021-01-01 RX ADMIN — BUDESONIDE AND FORMOTEROL FUMARATE DIHYDRATE 2 PUFF(S): 160; 4.5 AEROSOL RESPIRATORY (INHALATION) at 07:25

## 2021-01-01 RX ADMIN — FENTANYL CITRATE 50 MICROGRAM(S): 50 INJECTION INTRAVENOUS at 00:09

## 2021-01-01 RX ADMIN — Medication 650 MILLIGRAM(S): at 18:00

## 2021-01-01 RX ADMIN — PROPOFOL 3.56 MICROGRAM(S)/KG/MIN: 10 INJECTION, EMULSION INTRAVENOUS at 10:05

## 2021-01-01 RX ADMIN — MIDAZOLAM HYDROCHLORIDE 1.19 MG/KG/HR: 1 INJECTION, SOLUTION INTRAMUSCULAR; INTRAVENOUS at 14:47

## 2021-01-01 RX ADMIN — CARVEDILOL PHOSPHATE 12.5 MILLIGRAM(S): 80 CAPSULE, EXTENDED RELEASE ORAL at 21:06

## 2021-01-01 RX ADMIN — Medication 81 MILLIGRAM(S): at 09:51

## 2021-01-01 RX ADMIN — VASOPRESSIN 1.2 UNIT(S)/MIN: 20 INJECTION INTRAVENOUS at 06:21

## 2021-01-01 RX ADMIN — MIDAZOLAM HYDROCHLORIDE 1.19 MG/KG/HR: 1 INJECTION, SOLUTION INTRAMUSCULAR; INTRAVENOUS at 06:01

## 2021-01-01 RX ADMIN — PANTOPRAZOLE SODIUM 40 MILLIGRAM(S): 20 TABLET, DELAYED RELEASE ORAL at 10:03

## 2021-01-01 RX ADMIN — Medication 6 MILLIGRAM(S): at 10:30

## 2021-01-01 RX ADMIN — POLYETHYLENE GLYCOL 3350 17 GRAM(S): 17 POWDER, FOR SOLUTION ORAL at 09:50

## 2021-01-01 RX ADMIN — Medication 81 MILLIGRAM(S): at 10:07

## 2021-01-01 RX ADMIN — CHLORHEXIDINE GLUCONATE 15 MILLILITER(S): 213 SOLUTION TOPICAL at 06:01

## 2021-01-01 RX ADMIN — PROPOFOL 3.56 MICROGRAM(S)/KG/MIN: 10 INJECTION, EMULSION INTRAVENOUS at 05:07

## 2021-01-01 RX ADMIN — APIXABAN 5 MILLIGRAM(S): 2.5 TABLET, FILM COATED ORAL at 21:36

## 2021-01-01 RX ADMIN — PROPOFOL 3.56 MICROGRAM(S)/KG/MIN: 10 INJECTION, EMULSION INTRAVENOUS at 10:09

## 2021-01-01 RX ADMIN — TOCILIZUMAB 100 MILLIGRAM(S): 20 INJECTION, SOLUTION, CONCENTRATE INTRAVENOUS at 03:50

## 2021-01-01 RX ADMIN — PANTOPRAZOLE SODIUM 40 MILLIGRAM(S): 20 TABLET, DELAYED RELEASE ORAL at 09:51

## 2021-01-01 RX ADMIN — CEFEPIME 100 MILLIGRAM(S): 1 INJECTION, POWDER, FOR SOLUTION INTRAMUSCULAR; INTRAVENOUS at 21:12

## 2021-01-01 RX ADMIN — CARVEDILOL PHOSPHATE 12.5 MILLIGRAM(S): 80 CAPSULE, EXTENDED RELEASE ORAL at 23:14

## 2021-01-01 RX ADMIN — BUDESONIDE AND FORMOTEROL FUMARATE DIHYDRATE 2 PUFF(S): 160; 4.5 AEROSOL RESPIRATORY (INHALATION) at 09:18

## 2021-01-01 RX ADMIN — CEFEPIME 100 MILLIGRAM(S): 1 INJECTION, POWDER, FOR SOLUTION INTRAMUSCULAR; INTRAVENOUS at 09:56

## 2021-01-01 RX ADMIN — REMDESIVIR 540 MILLIGRAM(S): 5 INJECTION INTRAVENOUS at 10:09

## 2021-01-01 RX ADMIN — SODIUM CHLORIDE 1000 MILLILITER(S): 9 INJECTION INTRAMUSCULAR; INTRAVENOUS; SUBCUTANEOUS at 16:29

## 2021-01-01 RX ADMIN — Medication 2.5 MILLIGRAM(S): at 10:25

## 2021-01-01 RX ADMIN — PANTOPRAZOLE SODIUM 40 MILLIGRAM(S): 20 TABLET, DELAYED RELEASE ORAL at 21:11

## 2021-03-17 NOTE — ED PROVIDER NOTE - PROGRESS NOTE DETAILS
Bárbara Obrien for attending Dr. Johnson: Received call from Dr. Mann, pt's PCP. Pt's gf tested COVID + 1 week and pt tested positive at that time.  Pt has hx of hepatitis C, liver dysfunction, CHF, CKD. Reports pt is a poor historian at baseline. Pt's O2 at home in 80s prompting him to come to ED. Alex LENZ: endorsed to Dr. Vargas for admission; aware CT reads are pending at this time; recs high flow- resp contacted- will be placed on high flow. Alex DO: Patient able to be titrated to NC at this time; high flow held at this time- seen by respiratory therapist. I spoke to patient's Xavier- patient's brother- [phone]- he patient's POA- GO conversation- patient is full code. Per brother- hx of "liver cancer" in 2013, patient never got tested for covid- gf has covid- will swab at this time.

## 2021-03-17 NOTE — H&P ADULT - NSHPLABSRESULTS_GEN_ALL_CORE
12.9   13.71 )-----------( 424      ( 17 Mar 2021 12:38 )             38.1     03-17    136  |  106  |  17  ----------------------------<  114<H>  4.1   |  23  |  1.01    Ca    9.6      17 Mar 2021 12:38    TPro  8.3  /  Alb  2.6<L>  /  TBili  0.8  /  DBili  x   /  AST  38<H>  /  ALT  38  /  AlkPhos  98  03-17    CAPILLARY BLOOD GLUCOSE      < from: CT Angio Chest PE Protocol w/ IV Cont (03.17.21 @ 14:20) >  IMPRESSION:  Bilateral infiltrates consistent with COVID 19 pneumonia.  No evidence of pulmonary embolus.  < end of copied text >

## 2021-03-17 NOTE — H&P ADULT - NSHPREVIEWOFSYSTEMS_GEN_ALL_CORE
ROS:  General:  No fevers, chills, or unexplained weight loss  Skin: No rash or bothersome skin lesions  Musculoskeletal: No arthalgias, myalgias or joint swelling  Eyes: No visual changes or eye pain  Ears: No hearing loss , otorrhea or ear pain  Nose, Mouth, Throat: No nasal congestion, rhinorrhea, oral lesions, postnasal drip or sore throat  Cardio: No chest pain or palpitations. no lower extremity edema. no syncope. no claudication.   Respiratory: sob and coughing  GI: No diarrhea, constipation, blood in stools, abdominal pain, vomiting or heartburn  : No urinary frequency, hematuria, incontinence, or dysuria  Neurologic: No headaches, parasthesias, confusion, dysarthria or gait instability  Psychiatric:  No anxiety or depression  Lymphatic:  No easy bruising, easy bleeding or swollen glands  Allergic: No itching, sneezing , watery eyes, clear rhinorrhea or recurrent infections

## 2021-03-17 NOTE — ED PROVIDER NOTE - NEUROLOGICAL, MLM
Alert and oriented, no focal deficits, no motor or sensory deficits. Awake and alert, no focal deficits, no motor or sensory deficits.

## 2021-03-17 NOTE — H&P ADULT - HISTORY OF PRESENT ILLNESS
79 y/o male with a PMHx of hepatitis C, liver dysfunction, chronic CHF, CKD stage III who presents to the ED c/o SOB.  Pt tested COVID + on 3/7/21.  As per PMD, patient's girlfriend also has COVID.  PMD reports pt is a poor historian at baseline.  Pt's O2 at home in 80s prompting him to come to ED.  In the ER, sats 89% on RA.  Patient started on 5L NC and sats improved to upper 90's.  WBC elevated @ 13.  DDimer high 2434.  CTA negative for PE but positive for bilateral ground glass opacities c/w COVID-19.  Patient received Decadron 6 mg IV x1 in the ER and admitted.        PAST MEDICAL & SURGICAL HISTORY:  Liver dysfunction  CKD (chronic kidney disease)  CHF (congestive heart failure)  Hepatitis C    FAMILY HISTORY:  Unable to obtain as patient is minimally verbal.      Social History:    No present ETOH/ drug use/ tob use.    Lives at home with family.      Allergies:  No Known Allergies    Home Medications:  Albuterol (Eqv-Proventil HFA) 90 mcg/inh inhalation aerosol: 2 puff(s) inhaled every 6 hours, As Needed (17 Mar 2021 14:56)  Aspir 81 oral delayed release tablet: 1 tab(s) orally once a day (17 Mar 2021 14:56)  carvedilol 12.5 mg oral tablet: 1 tab(s) orally 2 times a day (17 Mar 2021 14:56)  Symbicort 160 mcg-4.5 mcg/inh inhalation aerosol: 2 puff(s) inhaled 2 times a day  ***DrFirst*** (17 Mar 2021 14:56)  Vitamin D3 1000 intl units (25 mcg) oral tablet: 1 tab(s) orally once a day (17 Mar 2021 14:56)  Zinc with copper: 1 tab(s) orally once a day (17 Mar 2021 14:56)

## 2021-03-17 NOTE — ED ADULT NURSE NOTE - OBJECTIVE STATEMENT
PT to ED as a known COVID + with c/o SOB. Reports Pt is from home, 02 sat on room air 87%, 92% on 5 LNC. PT noted to have some labored breathing with use of abdominal muscles, PT placed on non rebreather mask @15 L., 02 sat improved to 98%. Pt is a poor historian, PT responds to verbal stimulation but does not verbally respond. PT placed on cardiac and 02 monitoring. Isolation precautions initiated for COVID-19. Will continue to monitor and assess PT. Medications administered as per order, Labs specimens collected and sent to laboratory, pending results.

## 2021-03-17 NOTE — ED PROVIDER NOTE - WR ORDER ID 1
----- Message from Elo Brush NP sent at 6/26/2020 11:04 AM CDT -----  Just in case all of the results were not in for your appointment today: Please call patient.  Electrolytes are normal.  Kidney function is stable.  Liver function is normal.  Blood count (CBC) is normal.  Digoxin level is acceptable.    Keep up the good work!  Continue your current medications.  We will see you at your scheduled follow up appointment.       
I phoned the patient and gave her the results as stated below. She verbalized an understanding.  
[id number]

## 2021-03-17 NOTE — H&P ADULT - NSHPPHYSICALEXAM_GEN_ALL_CORE
PEx  T(C): 37.6 (03-17-21 @ 16:18), Max: 38.4 (03-17-21 @ 12:16)  HR: 93 (03-17-21 @ 17:42) (76 - 118)  BP: 133/67 (03-17-21 @ 17:42) (113/79 - 173/77)  RR: 28 (03-17-21 @ 17:42) (20 - 28)  SpO2: 94% (03-17-21 @ 16:45) (89% - 100%)    General:   chronically ill appearing male.  NAD.    Skin: no rash or prominent lesions  Head: normocephalic, atraumatic     Sinuses: non-tender  Nose: no external lesions, mucosa non-inflamed, septum and turbinates normal  Throat: no erythema, exudates or lesions.  Neck: Supple without lymphadenopathy. Thyroid no thyromegaly, no palpable thyroid nodules, no palpable nodules or masses, carotid arteries no bruits.   Breasts: No palpable masses or lesions.  Heart: RRR, no murmur or gallop.  Normal S1, S2.  No S3, S4.   Lungs: crackles at bases.     Chest wall: Normal insp   Abdomen:  Soft, NT/ND, normal bowel sounds, no HSM, no masses.  No peritoneal signs.   Back: spine normal without deformity or tenderness.  Normal ROM   : Exam normal.  no inguinal hernias.  Extremities: No deformities, clubbing, cyanosis, or edema.  Musculoskeletal: Normal gait and station. No decreased range of motion, instability, atrophy or abnormal strength or tone in the head, neck, spine, ribs, pelvis or extremities.   Neurologic: awake and alert.  makes eye contact.  not able to answer questions.

## 2021-03-17 NOTE — PATIENT PROFILE ADULT - FUNCTIONAL SCREEN CURRENT LEVEL: COMMUNICATION, MLM
unknown, pt not answering questions/ confused-per documentation at baseline/2 = difficulty speaking (not related to language barrier)

## 2021-03-17 NOTE — ED PROVIDER NOTE - OBJECTIVE STATEMENT
81 y/o male with no significant PMHx presents to the ED c/o SOB. Pt tested COVID + on 3/7/21. No other complaints at this time. 81 y/o male with a PMHx  of hepatitis C, liver dysfunction, CHF, CKD presents to the ED c/o SOB. Pt tested COVID + on 3/7/21. No other complaints at this time.

## 2021-03-17 NOTE — ED PROVIDER NOTE - PMH
No pertinent past medical history <<----- Click to add NO pertinent Past Medical History CHF (congestive heart failure)    CKD (chronic kidney disease)    Hepatitis C    Liver dysfunction

## 2021-03-18 NOTE — CONSULT NOTE ADULT - ASSESSMENT
81 y/o male with a PMHx of hepatitis C, liver dysfunction, chronic CHF, CKD stage III who presents to the ED c/o SOB.  Pt tested COVID + on 3/7/21.   He was sent to the ED on 3/17/21 after family reported to his PCP that he had decreased O2 sats.  He was noted to have paucity of speech as early as 3/17 in the AM and was noted to have right sided weakness around 12 PM today.  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    The patient is outside of the window for TPA  He is also outside of the window for any intervention such as thrombectomy. Nevertheless, he is unlikely to have a large vessel occlusion based on the findings on MRI brain.  He has a history of significant allergy to IV contrast and since any intervention is unlikely, the risks of contrast outweigh the benefits at this time.  This was explained to his health care proxy.  -Would get MRA head and neck when possible.  -With D-dimer of 2434, would use anticoagulation for stroke prevention. Avoid heparin (history of heparin induced thrombocytopenia  -Continue atorvastatin  -Swallowing well at this time. Will eventually benefit from speech pathology for aphasia.  -Permissive hypertension.    Patient being upgraded to telemetry.  Discussed with patient's health care proxy, Xavier, and Dr. Vargas.

## 2021-03-18 NOTE — PROGRESS NOTE ADULT - SUBJECTIVE AND OBJECTIVE BOX
History of Present Illness:   81 y/o male with a PMHx of hepatitis C, liver dysfunction, chronic CHF, CKD stage III who presents to the ED c/o SOB.  Pt tested COVID + on 3/7/21.  As per PMD, patient's girlfriend also has COVID.  PMD reports pt is a poor historian at baseline.  Pt's O2 at home in 80s prompting him to come to ED.  In the ER, sats 89% on RA.  Patient started on 5L NC and sats improved to upper 90's.  WBC elevated @ 13.  DDimer high 2434.  CTA negative for PE but positive for bilateral ground glass opacities c/w COVID-19.  Patient received Decadron 6 mg IV x1 in the ER and admitted.      3.18: confused, non verbal        REVIEW OF SYSTEMS:  unable to obtain due to confusion   All other review of systems is negative unless indicated above    Vital Signs Last 24 Hrs  T(C): 36.2 (18 Mar 2021 08:03), Max: 37.6 (17 Mar 2021 16:18)  T(F): 97.2 (18 Mar 2021 08:03), Max: 99.7 (17 Mar 2021 16:18)  HR: 81 (18 Mar 2021 08:03) (76 - 104)  BP: 133/62 (18 Mar 2021 08:03) (123/54 - 148/78)  BP(mean): 87 (17 Mar 2021 21:26) (82 - 97)  RR: 17 (18 Mar 2021 08:03) (17 - 28)  SpO2: 93% (18 Mar 2021 08:03) (90% - 98%)    PHYSICAL EXAM:    GENERAL: NAD, Well nourished  HEENT:  NC/AT, EOMI, PERRLA, No scleral icterus, Moist mucous membranes  NECK: Supple, No JVD  CNS:  Alert & Oriented X0, Motor Strength 5/5 B/L upper and lower extremities; DTRs 2+ intact   LUNG: Normal Breath sounds, Clear to auscultation bilaterally, No rales, No rhonchi, No wheezing  HEART: RRR; No murmurs, No rubs  ABDOMEN: +BS, ST/ND/NT  GENITOURINARY: Voiding, Bladder not distended  EXTREMITIES:  2+ Peripheral Pulses, No clubbing, No cyanosis, No tibial edema  MUSCULOSKELTAL: Joints normal ROM, No TTP, No effusion  SKIN: no rashes  RECTAL: deferred, not indicated  BREAST: deferred                          11.5   12.36 )-----------( 486      ( 18 Mar 2021 09:04 )             34.2     03-18    141  |  112<H>  |  37<H>  ----------------------------<  120<H>  3.9   |  22  |  0.90    Ca    9.4      18 Mar 2021 09:04    TPro  7.2  /  Alb  2.1<L>  /  TBili  0.5  /  DBili  x   /  AST  36  /  ALT  33  /  AlkPhos  82  03-18    Vancomycin levels:   Cultures:     MEDICATIONS  (STANDING):  aspirin enteric coated 81 milliGRAM(s) Oral daily  budesonide 160 MICROgram(s)/formoterol 4.5 MICROgram(s) Inhaler 2 Puff(s) Inhalation two times a day  carvedilol 12.5 milliGRAM(s) Oral every 12 hours  dexAMETHasone  Injectable 6 milliGRAM(s) IV Push daily  enoxaparin Injectable 40 milliGRAM(s) SubCutaneous daily  remdesivir  IVPB   IV Intermittent   remdesivir  IVPB 200 milliGRAM(s) IV Intermittent every 24 hours    MEDICATIONS  (PRN):  acetaminophen   Tablet .. 650 milliGRAM(s) Oral every 4 hours PRN Temp greater or equal to 38.5C (101.3F)  ALBUTerol    90 MICROgram(s) HFA Inhaler 2 Puff(s) Inhalation every 4 hours PRN Shortness of Breath and/or Wheezing  aluminum hydroxide/magnesium hydroxide/simethicone Suspension 30 milliLiter(s) Oral every 4 hours PRN Dyspepsia  benzonatate 100 milliGRAM(s) Oral three times a day PRN Cough  guaifenesin/dextromethorphan  Syrup 10 milliLiter(s) Oral every 4 hours PRN Cough  ondansetron Injectable 4 milliGRAM(s) IV Push every 6 hours PRN Nausea and/or Vomiting      all labs reviewed  all imaging reviewed          A/P    81 y/o male with a PMHx of hepatitis C, liver dysfunction, chronic CHF, CKD stage III who presents to the ED with progessive hypoxia secondary to COVID-19.      #Acute Hypoxic Respiratory failure 2/2 COVID19:    - Pulseox q4 hours.    - Cont O2 supplemental @5L and maintain SpO2 between 88-94%  - if requiring >6L NC-> switch to 100% NRB or HFNC  - IV decadron 6mg IV qd for 5-10 days  - IV Remdezavir   - F/u inflammatory markers. Monitor Q72hrs  - CTA negative for PE.    - Albuterol MDI to limit aerosolization    #Metabolic Encephalopathy:    As per notes, near baseline mental status.    CT head negative.    Ammonia level WNL.    ABG without significant hypercarbia.    Follow.      #Leukocytosis:    WBC usually normal with COVID.    Check UA/ urine culture to r/o UTI.    Repeat labs in am.    Hold on ABX for now.      #Hepatitis C/ Liver Dysfunction:    Stable.  Follow.      #Chronic CHF:    Unknown if diastolic/ systolic.    Cont home meds-- coreg.      #DVT Proph:    Lovenox.      #Advanced Directives:  Xavier (patient's brother) [phone].  He is patient's POA.  Patient is FULL CODE.   History of Present Illness:   81 y/o male with a PMHx of hepatitis C, liver dysfunction, chronic CHF, CKD stage III who presents to the ED c/o SOB.  Pt tested COVID + on 3/7/21.  As per PMD, patient's girlfriend also has COVID.  PMD reports pt is a poor historian at baseline.  Pt's O2 at home in 80s prompting him to come to ED.  In the ER, sats 89% on RA.  Patient started on 5L NC and sats improved to upper 90's.  WBC elevated @ 13.  DDimer high 2434.  CTA negative for PE but positive for bilateral ground glass opacities c/w COVID-19.  Patient received Decadron 6 mg IV x1 in the ER and admitted.      3.18: confused, non verbal        REVIEW OF SYSTEMS:  unable to obtain due to confusion   All other review of systems is negative unless indicated above    Vital Signs Last 24 Hrs  T(C): 36.2 (18 Mar 2021 08:03), Max: 37.6 (17 Mar 2021 16:18)  T(F): 97.2 (18 Mar 2021 08:03), Max: 99.7 (17 Mar 2021 16:18)  HR: 81 (18 Mar 2021 08:03) (76 - 104)  BP: 133/62 (18 Mar 2021 08:03) (123/54 - 148/78)  BP(mean): 87 (17 Mar 2021 21:26) (82 - 97)  RR: 17 (18 Mar 2021 08:03) (17 - 28)  SpO2: 93% (18 Mar 2021 08:03) (90% - 98%)    PHYSICAL EXAM:    GENERAL: NAD, Well nourished  HEENT:  NC/AT, EOMI, PERRLA, No scleral icterus, Moist mucous membranes  NECK: Supple, No JVD  CNS:  Alert & Oriented X0, non verbal, Rt side weakness noted   LUNG: Normal Breath sounds, Clear to auscultation bilaterally, No rales, No rhonchi, No wheezing  HEART: RRR; No murmurs, No rubs  ABDOMEN: +BS, ST/ND/NT  GENITOURINARY: Voiding, Bladder not distended  EXTREMITIES:  2+ Peripheral Pulses, No clubbing, No cyanosis, No tibial edema  MUSCULOSKELTAL: Joints normal ROM, No TTP, No effusion  SKIN: no rashes  RECTAL: deferred, not indicated  BREAST: deferred                          11.5   12.36 )-----------( 486      ( 18 Mar 2021 09:04 )             34.2     03-18    141  |  112<H>  |  37<H>  ----------------------------<  120<H>  3.9   |  22  |  0.90    Ca    9.4      18 Mar 2021 09:04    TPro  7.2  /  Alb  2.1<L>  /  TBili  0.5  /  DBili  x   /  AST  36  /  ALT  33  /  AlkPhos  82  03-18    Vancomycin levels:   Cultures:     MEDICATIONS  (STANDING):  aspirin enteric coated 81 milliGRAM(s) Oral daily  budesonide 160 MICROgram(s)/formoterol 4.5 MICROgram(s) Inhaler 2 Puff(s) Inhalation two times a day  carvedilol 12.5 milliGRAM(s) Oral every 12 hours  dexAMETHasone  Injectable 6 milliGRAM(s) IV Push daily  enoxaparin Injectable 40 milliGRAM(s) SubCutaneous daily  remdesivir  IVPB   IV Intermittent   remdesivir  IVPB 200 milliGRAM(s) IV Intermittent every 24 hours    MEDICATIONS  (PRN):  acetaminophen   Tablet .. 650 milliGRAM(s) Oral every 4 hours PRN Temp greater or equal to 38.5C (101.3F)  ALBUTerol    90 MICROgram(s) HFA Inhaler 2 Puff(s) Inhalation every 4 hours PRN Shortness of Breath and/or Wheezing  aluminum hydroxide/magnesium hydroxide/simethicone Suspension 30 milliLiter(s) Oral every 4 hours PRN Dyspepsia  benzonatate 100 milliGRAM(s) Oral three times a day PRN Cough  guaifenesin/dextromethorphan  Syrup 10 milliLiter(s) Oral every 4 hours PRN Cough  ondansetron Injectable 4 milliGRAM(s) IV Push every 6 hours PRN Nausea and/or Vomiting      all labs reviewed  all imaging reviewed          A/P    81 y/o male with a PMHx of hepatitis C, liver dysfunction, chronic CHF, CKD stage III who presents to the ED with progessive hypoxia secondary to COVID-19.      #Acute Hypoxic Respiratory failure 2/2 COVID19:    - Pulseox q4 hours.    - Cont O2 supplemental @5L and maintain SpO2 between 88-94%  - if requiring >6L NC-> switch to 100% NRB or HFNC  - IV decadron 6mg IV qd for 5-10 days  - IV Remdezavir   - F/u inflammatory markers. Monitor Q72hrs  - CTA negative for PE.    - Albuterol MDI to limit aerosolization    #Metabolic Encephalopathy due to Acute Left CVA:  ASA/Statins  Neuro evaluation   Telemetry  Speech and Swallow eval, PT evaluation   Echocardiogram  not a candidate for TPA due to onset    #Leukocytosis:    WBC usually normal with COVID.    Check UA/ urine culture to r/o UTI.    Repeat labs in am.    Hold on ABX for now.      #Hepatitis C/ Liver Dysfunction:    Stable.  Follow.      #Chronic CHF:    Unknown if diastolic/ systolic.    Cont home meds-- coreg.      #DVT Proph:    Lovenox.      #Advanced Directives:  Xavier (patient's brother) [phone].  He is patient's POA.  Patient is FULL CODE.

## 2021-03-18 NOTE — CONSULT NOTE ADULT - SUBJECTIVE AND OBJECTIVE BOX
Patient is a 80y old  Male who presents with a chief complaint of SOB and hypoxia.    HPI:  81 y/o male with h/o chronic hepatitis C, liver dysfunction, chronic CHF, CKD stage III, recently diagnosed with COVID-19 viral syndrome was admitted on 3/17 for worsening SOB.  Pt tested COVID + on 3/7/21. The pt is a poor historian at baseline. Pt's O2 at home in 80s prompting him to come to ED. In the ER, sats 89% on RA.  Patient started on 5L NC and sats improved to upper 90's.       PAST MEDICAL & SURGICAL HISTORY:  Liver dysfunction  CKD (chronic kidney disease)  CHF (congestive heart failure)  Hepatitis C    Meds: per reconciliation sheet, noted below  MEDICATIONS  (STANDING):  aspirin enteric coated 81 milliGRAM(s) Oral daily  budesonide 160 MICROgram(s)/formoterol 4.5 MICROgram(s) Inhaler 2 Puff(s) Inhalation two times a day  carvedilol 12.5 milliGRAM(s) Oral every 12 hours  dexAMETHasone  Injectable 6 milliGRAM(s) IV Push daily  enoxaparin Injectable 40 milliGRAM(s) SubCutaneous daily    MEDICATIONS  (PRN):  acetaminophen   Tablet .. 650 milliGRAM(s) Oral every 4 hours PRN Temp greater or equal to 38.5C (101.3F)  ALBUTerol    90 MICROgram(s) HFA Inhaler 2 Puff(s) Inhalation every 4 hours PRN Shortness of Breath and/or Wheezing  aluminum hydroxide/magnesium hydroxide/simethicone Suspension 30 milliLiter(s) Oral every 4 hours PRN Dyspepsia  benzonatate 100 milliGRAM(s) Oral three times a day PRN Cough  guaifenesin/dextromethorphan  Syrup 10 milliLiter(s) Oral every 4 hours PRN Cough  ondansetron Injectable 4 milliGRAM(s) IV Push every 6 hours PRN Nausea and/or Vomiting    Allergies    No Known Allergies    Intolerances      Social: no smoking, no alcohol, no illegal drugs; no recent travel, no exposure to TB  patient's girlfriend also has COVID  FAMILY HISTORY:    no history of premature cardiovascular disease in first degree relatives    ROS: the patient denies HA, no seizures, no dizziness, no sore throat, no nasal congestion, no blurry vision, no CP, no palpitations, has SOB, has cough, no abdominal pain, no diarrhea, no N/V, no dysuria, no leg pain, no claudication, no rash, no joint aches, no rectal pain or bleeding, no night sweats; has increased weakness  All other systems reviewed and are negative    Vital Signs Last 24 Hrs  T(C): 36.2 (18 Mar 2021 08:03), Max: 38.4 (17 Mar 2021 12:16)  T(F): 97.2 (18 Mar 2021 08:03), Max: 101.1 (17 Mar 2021 12:16)  HR: 81 (18 Mar 2021 08:03) (76 - 118)  BP: 133/62 (18 Mar 2021 08:03) (113/79 - 173/77)  BP(mean): 87 (17 Mar 2021 21:26) (82 - 97)  RR: 17 (18 Mar 2021 08:03) (17 - 28)  SpO2: 93% (18 Mar 2021 08:03) (89% - 100%)  Daily Height in cm: 157.48 (17 Mar 2021 12:16)    Daily     PE:    Constitutional:  No acute distress  HEENT: NC/AT, EOMI, PERRLA, conjunctivae clear; ears and nose atraumatic; pharynx benign  Neck: supple; thyroid not palpable  Back: no tenderness  Respiratory: respiratory effort normal; crackles at bases  Cardiovascular: S1S2 regular, no murmurs  Abdomen: soft, not tender, not distended, positive BS; no liver or spleen organomegaly  Genitourinary: no suprapubic tenderness  Lymphatic: no LN palpable  Musculoskeletal: no muscle tenderness, no joint swelling or tenderness  Extremities: no pedal edema  Neurological/ Psychiatric: AxOx3, judgement and insight normal; moving all extremities  Skin: no rashes; no palpable lesions    Labs: all available labs reviewed                           1236 )-----------( 486      ( 18 Mar 2021 09:04 )             34.2     03-18    141  |  112<H>  |  37<H>  ----------------------------<  120<H>  3.9   |  22  |  0.90    Ca    9.4      18 Mar 2021 09:04    TPro  7.2  /  Alb  2.1<L>  /  TBili  0.5  /  DBili  x   /  AST  36  /  ALT  33  /  AlkPhos  82  03-18     LIVER FUNCTIONS - ( 18 Mar 2021 09:04 )  Alb: 2.1 g/dL / Pro: 7.2 gm/dL / ALK PHOS: 82 U/L / ALT: 33 U/L / AST: 36 U/L / GGT: x           Urinalysis Basic - ( 18 Mar 2021 04:15 )    Color: Sparkle / Appearance: Clear / S.010 / pH: x  Gluc: x / Ketone: Trace  / Bili: Negative / Urobili: Negative   Blood: x / Protein: 100 / Nitrite: Negative   Leuk Esterase: Negative / RBC: 0-2 /HPF / WBC Negative   Sq Epi: x / Non Sq Epi: Occasional / Bacteria: Negative    COVID-19 PCR: Detected (21 @ 14:45)    Radiology: all available radiological tests reviewed    < from: CT Angio Chest PE Protocol w/ IV Cont (21 @ 14:20) >  Bilateral infiltrates consistent with COVID 19 pneumonia.  No evidence of pulmonary embolus.  < end of copied text >      Advanced directives addressed: full resuscitation

## 2021-03-18 NOTE — CONSULT NOTE ADULT - SUBJECTIVE AND OBJECTIVE BOX
Patient is a 80y old  Male who presents with a chief complaint of     HPI:  81 y/o male with a PMHx of hepatitis C, liver dysfunction, chronic CHF, CKD stage III who presents to the ED c/o SOB.  Pt tested COVID + on 3/7/21.  As per PMD, patient's girlfriend also has COVID.  PMD reports pt is a poor historian at baseline.  Pt's O2 at home in 80s prompting him to come to ED.  In the ER, sats 89% on RA.  Patient started on 5L NC and sats improved to upper 90's.  WBC elevated @ 13.  DDimer high 2434.  CTA negative for PE but positive for bilateral ground glass opacities c/w COVID-19.  Patient received Decadron 6 mg IV x1 in the ER and admitted.      His brother Xavier reports that his first neurological symptom started in the AM on 3/17/21.   His daughter Jasmin alerted Xavier to the fact that he was not speaking in the AM on 3/17/21. The admitting physician's note at 17:35 also noted that he was unable to answer questions.  Xavier's son spoke to him on 3/16/21 at about 8 PM and he seemed fine.  Xavier spoke to him on 3/17/21 at about 10 PM and he was slurring his speech. He was only able to give him "yes" and "no" answers.  Today his nurse detected some right sided weakness around lunch.  MRI brain showed watershed infarcts.    Xavier reports that he has a history of a blocked carotid artery (he believes it is on the right).  He saw a vascular surgeon at [place] who recommended medical therapy.    He has an allergy to contrast as per his brother. He had respiratory arrest and kidney failure in the past after receiving contrast.   He also has a history of heparin induced thrombocytopenia.  He had angioplasty in his left leg and a stent in his right leg. He previously had high blood pressure and then his blood pressure normalized.    PAST MEDICAL & SURGICAL HISTORY:  Liver dysfunction  CKD (chronic kidney disease)  CHF (congestive heart failure)  Hepatitis C    FAMILY HISTORY:  Unable to obtain as patient is minimally verbal.      Social History:    No present ETOH/ drug use/ tob use.    Lives at home with family.        MEDICATIONS  (STANDING):  aspirin enteric coated 81 milliGRAM(s) Oral daily  atorvastatin 40 milliGRAM(s) Oral at bedtime  budesonide 160 MICROgram(s)/formoterol 4.5 MICROgram(s) Inhaler 2 Puff(s) Inhalation two times a day  carvedilol 12.5 milliGRAM(s) Oral every 12 hours  dexAMETHasone  Injectable 6 milliGRAM(s) IV Push daily  enoxaparin Injectable 40 milliGRAM(s) SubCutaneous daily  remdesivir  IVPB   IV Intermittent        Allergies    contrast media (iodine-based) (Anaphylaxis)  heparin containing compounds (Anaphylaxis; Other (Severe))    Intolerances  Plavix -> lightheadedness        ROS: Pertinent positives in HPI, all other ROS were reviewed and are negative.      Vital Signs Last 24 Hrs  T(C): 36 (18 Mar 2021 15:45), Max: 37.1 (17 Mar 2021 18:38)  T(F): 96.8 (18 Mar 2021 15:45), Max: 98.8 (17 Mar 2021 18:38)  HR: 79 (18 Mar 2021 15:45) (79 - 104)  BP: 140/76 (18 Mar 2021 15:45) (123/54 - 147/68)  BP(mean): 87 (17 Mar 2021 21:26) (82 - 88)  RR: 20 (18 Mar 2021 15:45) (17 - 28)  SpO2: 92% (18 Mar 2021 15:45) (90% - 98%)        Constitutional: awake and alert.  HEENT: PERRLA, EOMI,   Neck: Supple.  Respiratory: Breath sounds are clear bilaterally  Cardiovascular: S1 and S2, regular / irregular rhythm  Gastrointestinal: soft, nontender  Extremities:  no edema  Musculoskeletal: no joint swelling/tenderness, no abnormal movements  Skin: No rashes    Neurological exam:  HF: Awake and alert. Makes eye contact.  Does not follow commands. Mute.   CN: BLANCA, EOMI, no facial weakness noted, no NLFD, tongue midline, Palate moves equally,   Motor: Not able to follow commands for confrontation testing but appears to have mild weakness in the right hand (4+/5). 5/5 in LUE. Moving both legs  Sens: withdraws to all extremities  Reflexes: Symmetric and normal . BJ 2+, BR 2+, KJ 2+, AJ 2+, downgoing toes b/l  Coord:  unable to test  Gait/Balance: Not tested          Labs:   03-18    141  |  112<H>  |  37<H>  ----------------------------<  120<H>  3.9   |  22  |  0.90    Ca    9.4      18 Mar 2021 09:04    TPro  7.2  /  Alb  2.1<L>  /  TBili  0.5  /  DBili  x   /  AST  36  /  ALT  33  /  AlkPhos  82  03-18                              11.5   12.36 )-----------( 486      ( 18 Mar 2021 09:04 )             34.2       Radiology:  CT head no contrast 3/17/21:  Suspected chronic sinusitis.  No acute hemorrhage or midline shift. If symptoms persist consider follow up head CT or MRI if no contraindications.    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

## 2021-03-18 NOTE — CONSULT NOTE ADULT - ASSESSMENT
79 y/o male with h/o chronic hepatitis C, liver dysfunction, chronic CHF, CKD stage III, recently diagnosed with COVID-19 viral syndrome was admitted on 3/17 for worsening SOB.  Pt tested COVID + on 3/7/21. The pt is a poor historian at baseline. Pt's O2 at home in 80s prompting him to come to ED. In the ER, sats 89% on RA.  Patient started on 5L NC and sats improved to upper 90's.     1. COVID-19 viral syndrome. Acute respiratory failure. Multifocal pneumonia.   -febrile syndrome  -leukocytosis  -start remdesivir protocol  -remdesivir risks and benefits reviewed with patient and he agreed with the use of the antiviral medication  -O2 therapy  -AC  -steroids  -respiratory care  -droplet isolation  -old chart reviewed to assess prior cultures  -monitor temps  -f/u CBC  -supportive care  2. Other issues:   -care per medicine

## 2021-03-19 NOTE — SWALLOW BEDSIDE ASSESSMENT ADULT - SLP GENERAL OBSERVATIONS
On encounter, bilateral wrist restraints were in place. A loss of bulk was evident in strap muscle regions. He moved his LUE better than RUE. Pt was alert. Able to sustain interactive eye gaze but he was distractible at times. Pt could not be directed to structured communication tasks nor did he follow commands or verbalize. Cognitive linguistic dysfunction noted with Global Aphasia features in setting of acute left CVA/encephalopathy related to COVID.

## 2021-03-19 NOTE — PROVIDER CONTACT NOTE (OTHER) - SITUATION
MD is aware of patients admission to . MD spoke with Ann Marie Garcia RN from [address] [place] [address].
pt dessating on NC, placed on 50% venti mask. pt ripping monitor, mask off, requires wrist restraints at this evaristo
pt with short 2-3 second bursts of svt throughout night. asymptomatic, not sustaining. no cardiology consult in place

## 2021-03-19 NOTE — PHYSICAL THERAPY INITIAL EVALUATION ADULT - MANUAL MUSCLE TESTING RESULTS, REHAB EVAL
unable to follow/comprehend formal testing procedures ,moving LUE/LLE spontaneously and WFL ,able to hold R arm briefly against gravity when arm elevated by PT

## 2021-03-19 NOTE — SWALLOW BEDSIDE ASSESSMENT ADULT - ASR SWALLOW DENTITION
Notable for maxillary denture placement and natural mandibular dentition with several missing posterior lower teeth. Testing was limited buy reduced auditory comprehension. Notable for maxillary denture placement and natural mandibular dentition with several missing posterior lower teeth. Testing was limited by reduced auditory comprehension.

## 2021-03-19 NOTE — SWALLOW BEDSIDE ASSESSMENT ADULT - SWALLOW EVAL: DIAGNOSIS
1) Feeding integrity hindered by his UE's being restrained as well as variably altered cognition. This is atop Oropharyngeal Swallowing abilities which subjectively appears to be grossly within functional parameters for age. NO behavioral aspiration signs exhibited.  2) On encounter, bilateral wrist restraints were in place. A loss of bulk was evident in strap muscle regions. He moved his LUE better than RUE. Pt was alert. Able to sustain interactive eye gaze but he was distractible at times. Pt could not be directed to structured communication tasks nor did he follow commands or verbalize. Cognitive linguistic dysfunction noted with Global Aphasia features in setting of acute left CVA/encephalopathy related to COVID.
10

## 2021-03-19 NOTE — CONSULT NOTE ADULT - SUBJECTIVE AND OBJECTIVE BOX
Patient is a 80y old  Male who presents with a chief complaint of sob    HPI:  81 y/o male with a PMHx of hepatitis C, liver dysfunction, chronic CHF, CKD stage III who presents to the ED c/o SOB.  Pt tested COVID + on 3/7/21.  As per PMD, patient's girlfriend also has COVID.  PMD reports pt is a poor historian at baseline.  Pt's O2 at home in 80s prompting him to come to ED.  In the ER, sats 89% on RA.  Patient started on 5L NC and sats improved to upper 90's.  WBC elevated @ 13.  DDimer high 2434.  CTA negative for PE but positive for bilateral ground glass opacities c/w COVID-19.  Patient received Decadron 6 mg IV x1 in the ER and admitted.      3.17.21 EKG: ST with frequent PVC LBBB/inf directed and discordant aVr/aVl, likely RV septal PVC    TELEMETRY:      CARDIAC TESTS:  PAST MEDICAL & SURGICAL HISTORY:  Liver dysfunction  CKD (chronic kidney disease)  CHF (congestive heart failure)  Hepatitis C    FAMILY HISTORY:  Unable to obtain as patient is minimally verbal.      Social History:    No present ETOH/ drug use/ tob use.    Lives at home with family.      Allergies:  No Known Allergies    Home Medications:  Albuterol (Eqv-Proventil HFA) 90 mcg/inh inhalation aerosol: 2 puff(s) inhaled every 6 hours, As Needed (17 Mar 2021 14:56)  Aspir 81 oral delayed release tablet: 1 tab(s) orally once a day (17 Mar 2021 14:56)  carvedilol 12.5 mg oral tablet: 1 tab(s) orally 2 times a day (17 Mar 2021 14:56)  Symbicort 160 mcg-4.5 mcg/inh inhalation aerosol: 2 puff(s) inhaled 2 times a day  ***DrFirst*** (17 Mar 2021 14:56)  Vitamin D3 1000 intl units (25 mcg) oral tablet: 1 tab(s) orally once a day (17 Mar 2021 14:56)  Zinc with copper: 1 tab(s) orally once a day (17 Mar 2021 14:56      MEDICATIONS  (STANDING):  apixaban 5 milliGRAM(s) Oral every 12 hours  aspirin enteric coated 81 milliGRAM(s) Oral daily  atorvastatin 40 milliGRAM(s) Oral at bedtime  budesonide 160 MICROgram(s)/formoterol 4.5 MICROgram(s) Inhaler 2 Puff(s) Inhalation two times a day  carvedilol 12.5 milliGRAM(s) Oral every 12 hours  dexAMETHasone  Injectable 6 milliGRAM(s) IV Push daily  remdesivir  IVPB   IV Intermittent   remdesivir  IVPB 100 milliGRAM(s) IV Intermittent every 24 hours    MEDICATIONS  (PRN):  acetaminophen   Tablet .. 650 milliGRAM(s) Oral every 4 hours PRN Temp greater or equal to 38.5C (101.3F)  ALBUTerol    90 MICROgram(s) HFA Inhaler 2 Puff(s) Inhalation every 4 hours PRN Shortness of Breath and/or Wheezing  aluminum hydroxide/magnesium hydroxide/simethicone Suspension 30 milliLiter(s) Oral every 4 hours PRN Dyspepsia  benzonatate 100 milliGRAM(s) Oral three times a day PRN Cough  guaifenesin/dextromethorphan  Syrup 10 milliLiter(s) Oral every 4 hours PRN Cough  ondansetron Injectable 4 milliGRAM(s) IV Push every 6 hours PRN Nausea and/or Vomiting      REVIEW OF SYSTEMS:    CONSTITUTIONAL:  As per HPI.  HEENT:  Eyes:  No diplopia or blurred vision. ENT:  No earache, sore throat or runny nose.  CARDIOVASCULAR:  No Dsypnea/Palpitations/Dizziness/Syncope, No pressure, squeezing, strangling, tightness, heaviness or aching about the chest, neck, axilla or epigastrium.  RESPIRATORY:  No cough, shortness of breath, PND or orthopnea.  GASTROINTESTINAL:  No nausea, vomiting or diarrhea.  GENITOURINARY:  No dysuria, frequency or urgency.  MUSCULOSKELETAL:  As per HPI.  SKIN:  No change in skin, hair or nails.  NEUROLOGIC:  No paresthesias, fasciculations, seizures or weakness.  PSYCHIATRIC:  No disorder of thought or mood.  ENDOCRINE:  No heat or cold intolerance, polyuria or polydipsia.  HEMATOLOGICAL:  No easy bruising or bleedings:    T(C): 36.5 (21 @ 20:56), Max: 36.6 (21 @ 17:50)  HR: 85 (21 @ 20:56) (79 - 89)  BP: 153/80 (21 @ 20:56) (133/62 - 169/81)  RR: 18 (21 @ 20:56) (17 - 20)  SpO2: 93% (21 @ 20:56) (90% - 95%)    PHYSICAL EXAMINATION:    GENERAL APPEARANCE:  Pt. is not currently dyspneic, in no distress. Pt. is alert, oriented, and pleasant.  HEENT:  Pupils are normal and react normally. No icterus. Mucous membranes well colored.  NECK:  Supple. No lymphadenopathy. Jugular venous pressure not elevated. Carotids equal.   HEART:   regular rate and rhythm/ Afib. There are no murmurs, rubs or gallops noted  CHEST:  Chest is clear to auscultation. Normal respiratory effort.  ABDOMEN:  Soft and nontender.   EXTREMITIES:  There is no edema, warm and dry.      I&O's Summary    18 Mar 2021 07:01  -  19 Mar 2021 07:00  --------------------------------------------------------  IN: 0 mL / OUT: 200 mL / NET: -200 mL        LABS:                        11.5   12.36 )-----------( 486      ( 18 Mar 2021 09:04 )             34.2     18    141  |  112<H>  |  37<H>  ----------------------------<  120<H>  3.9   |  22  |  0.90    Ca    9.4      18 Mar 2021 09:04    TPro  7.2  /  Alb  2.1<L>  /  TBili  0.5  /  DBili  x   /  AST  36  /  ALT  33  /  AlkPhos  82      LIVER FUNCTIONS - ( 18 Mar 2021 09:04 )  Alb: 2.1 g/dL / Pro: 7.2 gm/dL / ALK PHOS: 82 U/L / ALT: 33 U/L / AST: 36 U/L / GGT: x           PT/INR - ( 18 Mar 2021 09:04 )   PT: 12.5 sec;   INR: 1.08 ratio               Urinalysis Basic - ( 18 Mar 2021 04:15 )    Color: Sparkle / Appearance: Clear / S.010 / pH: x  Gluc: x / Ketone: Trace  / Bili: Negative / Urobili: Negative   Blood: x / Protein: 100 / Nitrite: Negative   Leuk Esterase: Negative / RBC: 0-2 /HPF / WBC Negative   Sq Epi: x / Non Sq Epi: Occasional / Bacteria: Negative        Thank you for involving our service in participating in the care of this patient  Patient is a 80y old  Male who presents with a chief complaint of sob    HPI:  79 y/o male with a PMHx of hepatitis C, liver dysfunction, chronic CHF, CAD CABG,  who presents to the ED c/o SOB.  Pt tested COVID + on 3/7/21, patient had CVA with acute aphasia and inability to follow commands, currently hypoxic requiring O2 via mask.  As per PMD, patient's girlfriend also has COVID.  PMD reports pt is a poor historian at baseline.  Pt's O2 at home in 80s prompting him to come to ED.  In the ER, sats 89% on RA.  Patient started on 5L NC and sats improved to upper 90's.  WBC elevated @ 13.  DDimer high 2434.  CTA negative for PE but positive for bilateral ground glass opacities c/w COVID-19.  Patient received Decadron 6 mg IV x1 in the ER and admitted.      3.17.21 EKG: ST with frequent PVC LBBB/inf directed and discordant aVr/aVl, likely RV septal PVC    TELEMETRY: reviewed SR frequent PVC, brief episodes of non sustained Afib     CARDIAC TESTS:  PAST MEDICAL & SURGICAL HISTORY:  Liver dysfunction  CKD (chronic kidney disease)  CHF (congestive heart failure)  Hepatitis C    FAMILY HISTORY:  Unable to obtain as patient is minimally verbal.      Social History:    No present ETOH/ drug use/ tob use.    Lives at home with family.      Allergies:  No Known Allergies    Home Medications:  Albuterol (Eqv-Proventil HFA) 90 mcg/inh inhalation aerosol: 2 puff(s) inhaled every 6 hours, As Needed (17 Mar 2021 14:56)  Aspir 81 oral delayed release tablet: 1 tab(s) orally once a day (17 Mar 2021 14:56)  carvedilol 12.5 mg oral tablet: 1 tab(s) orally 2 times a day (17 Mar 2021 14:56)  Symbicort 160 mcg-4.5 mcg/inh inhalation aerosol: 2 puff(s) inhaled 2 times a day  ***DrFirst*** (17 Mar 2021 14:56)  Vitamin D3 1000 intl units (25 mcg) oral tablet: 1 tab(s) orally once a day (17 Mar 2021 14:56)  Zinc with copper: 1 tab(s) orally once a day (17 Mar 2021 14:56      MEDICATIONS  (STANDING):  apixaban 5 milliGRAM(s) Oral every 12 hours  aspirin enteric coated 81 milliGRAM(s) Oral daily  atorvastatin 40 milliGRAM(s) Oral at bedtime  budesonide 160 MICROgram(s)/formoterol 4.5 MICROgram(s) Inhaler 2 Puff(s) Inhalation two times a day  carvedilol 12.5 milliGRAM(s) Oral every 12 hours  dexAMETHasone  Injectable 6 milliGRAM(s) IV Push daily  remdesivir  IVPB   IV Intermittent   remdesivir  IVPB 100 milliGRAM(s) IV Intermittent every 24 hours    MEDICATIONS  (PRN):  acetaminophen   Tablet .. 650 milliGRAM(s) Oral every 4 hours PRN Temp greater or equal to 38.5C (101.3F)  ALBUTerol    90 MICROgram(s) HFA Inhaler 2 Puff(s) Inhalation every 4 hours PRN Shortness of Breath and/or Wheezing  aluminum hydroxide/magnesium hydroxide/simethicone Suspension 30 milliLiter(s) Oral every 4 hours PRN Dyspepsia  benzonatate 100 milliGRAM(s) Oral three times a day PRN Cough  guaifenesin/dextromethorphan  Syrup 10 milliLiter(s) Oral every 4 hours PRN Cough  ondansetron Injectable 4 milliGRAM(s) IV Push every 6 hours PRN Nausea and/or Vomiting      REVIEW OF SYSTEMS:    CONSTITUTIONAL:  As per HPI. aphasic cannot follow commands    T(C): 36.5 (21 @ 20:56), Max: 36.6 (21 @ 17:50)  HR: 85 (21 @ 20:56) (79 - 89)  BP: 153/80 (21 @ 20:56) (133/62 - 169/81)  RR: 18 (21 @ 20:56) (17 - 20)  SpO2: 93% (21 @ 20:56) (90% - 95%)    PHYSICAL EXAMINATION:    GENERAL APPEARANCE:  Pt. is comfortable on O2 via mask, restrained cannot follow any commands aphasic   HEENT:  Pupils are normal and react normally. No icterus. Mucous membranes well colored.  NECK:  Supple. No lymphadenopathy. Jugular venous pressure not elevated. Carotids equal.   HEART:   regular rate and rhythm/ Afib. There are no murmurs, rubs or gallops noted  CHEST:  Chest is clear to auscultation. Normal respiratory effort.  ABDOMEN:  Soft and nontender.   EXTREMITIES:  There is no edema, warm and dry.      I&O's Summary    18 Mar 2021 07:01  -  19 Mar 2021 07:00  --------------------------------------------------------  IN: 0 mL / OUT: 200 mL / NET: -200 mL        LABS:                        11.5   12.36 )-----------( 486      ( 18 Mar 2021 09:04 )             34.2     03-18    141  |  112<H>  |  37<H>  ----------------------------<  120<H>  3.9   |  22  |  0.90    Ca    9.4      18 Mar 2021 09:04    TPro  7.2  /  Alb  2.1<L>  /  TBili  0.5  /  DBili  x   /  AST  36  /  ALT  33  /  AlkPhos  82  03-18    LIVER FUNCTIONS - ( 18 Mar 2021 09:04 )  Alb: 2.1 g/dL / Pro: 7.2 gm/dL / ALK PHOS: 82 U/L / ALT: 33 U/L / AST: 36 U/L / GGT: x           PT/INR - ( 18 Mar 2021 09:04 )   PT: 12.5 sec;   INR: 1.08 ratio               Urinalysis Basic - ( 18 Mar 2021 04:15 )    Color: Sparkle / Appearance: Clear / S.010 / pH: x  Gluc: x / Ketone: Trace  / Bili: Negative / Urobili: Negative   Blood: x / Protein: 100 / Nitrite: Negative   Leuk Esterase: Negative / RBC: 0-2 /HPF / WBC Negative   Sq Epi: x / Non Sq Epi: Occasional / Bacteria: Negative        Thank you for involving our service in participating in the care of this patient

## 2021-03-19 NOTE — PROGRESS NOTE ADULT - SUBJECTIVE AND OBJECTIVE BOX
Date of service: 03-19-21 @ 10:43    Lying in bed in NAD  Has dry cough  Alert and confused  Fever is down    ROS: poorly verbal    MEDICATIONS  (STANDING):  apixaban 5 milliGRAM(s) Oral every 12 hours  aspirin enteric coated 81 milliGRAM(s) Oral daily  atorvastatin 40 milliGRAM(s) Oral at bedtime  budesonide 160 MICROgram(s)/formoterol 4.5 MICROgram(s) Inhaler 2 Puff(s) Inhalation two times a day  carvedilol 12.5 milliGRAM(s) Oral every 12 hours  dexAMETHasone  Injectable 6 milliGRAM(s) IV Push daily  remdesivir  IVPB   IV Intermittent   remdesivir  IVPB 100 milliGRAM(s) IV Intermittent every 24 hours    Vital Signs Last 24 Hrs  T(C): 36.3 (19 Mar 2021 09:20), Max: 36.6 (18 Mar 2021 17:50)  T(F): 97.3 (19 Mar 2021 09:20), Max: 97.8 (18 Mar 2021 17:50)  HR: 71 (19 Mar 2021 09:20) (71 - 89)  BP: 160/75 (19 Mar 2021 09:20) (140/76 - 169/81)  BP(mean): --  RR: 18 (19 Mar 2021 09:20) (18 - 20)  SpO2: 94% (19 Mar 2021 09:20) (90% - 95%)     Physical exam:    Constitutional:  No acute distress  HEENT: NC/AT, EOMI, PERRLA, conjunctivae clear  Neck: supple; thyroid not palpable  Back: no tenderness  Respiratory: respiratory effort normal; crackles at bases  Cardiovascular: S1S2 regular, no murmurs  Abdomen: soft, not tender, not distended, positive BS  Genitourinary: no suprapubic tenderness  Lymphatic: no LN palpable  Musculoskeletal: no muscle tenderness, no joint swelling or tenderness  Extremities: no pedal edema  Neurological/ Psychiatric: confused; moving all extremities  Skin: no rashes; no palpable lesions    Labs: reviewed                        11.5 12.36 )-----------( 486      ( 18 Mar 2021 09:04 )             34.2     03-19    x   |  x   |  x   ----------------------------<  x   x    |  x   |  0.91    Ca    9.4      18 Mar 2021 09:04    TPro  7.3  /  Alb  2.1<L>  /  TBili  0.4  /  DBili  0.2  /  AST  54<H>  /  ALT  59  /  AlkPhos  121<H>  03-19      D-Dimer Assay, Quantitative: 2000 ng/mL DDU (03-18-21 @ 17:14)  C-Reactive Protein, Serum: 273 mg/L (03-17-21 @ 12:38)  D-Dimer Assay, Quantitative: 2434 ng/mL DDU (03-17-21 @ 12:38)  Ferritin, Serum: 475 ng/mL (03-17-21 @ 12:38)      Culture - Blood (collected 17 Mar 2021 13:27)  Source: .Blood None  Preliminary Report (18 Mar 2021 19:02):    No growth to date.    Culture - Blood (collected 17 Mar 2021 12:38)  Source: .Blood Blood-Peripheral  Preliminary Report (18 Mar 2021 17:02):    No growth to date.    Culture - Urine (collected 17 Mar 2021 04:15)  Source: .Urine Clean Catch (Midstream)  Final Report (19 Mar 2021 09:57):    No growth    COVID-19 PCR: Detected (03-17-21 @ 14:45)    Radiology: all available radiological tests reviewed    < from: CT Angio Chest PE Protocol w/ IV Cont (03.17.21 @ 14:20) >  Bilateral infiltrates consistent with COVID 19 pneumonia.  No evidence of pulmonary embolus.  < end of copied text >      Advanced directives addressed: full resuscitation

## 2021-03-19 NOTE — PHYSICAL THERAPY INITIAL EVALUATION ADULT - SITTING BALANCE: STATIC
occasional listing to RIGHT & POSTERIOR,,good effort observed at self correction but requires intermittent external correction/assist from PT/good minus

## 2021-03-19 NOTE — PHYSICAL THERAPY INITIAL EVALUATION ADULT - PLANNED THERAPY INTERVENTIONS, PT EVAL
increase attention/awareness to involved R hemibody/balance training/bed mobility training/gait training/neuromuscular re-education/strengthening/transfer training

## 2021-03-19 NOTE — CONSULT NOTE ADULT - SUBJECTIVE AND OBJECTIVE BOX
Patient is a 80y old  Male who presents with a chief complaint of     HPI:  79 y/o male with a PMHx of hepatitis C, liver dysfunction, chronic CHF, CAD CABG , CKD stage III who presents to the ED c/o SOB.  Pt tested COVID + on 3/7/21.  As per PMD, patient's girlfriend also has COVID.  PMD reports pt is a poor historian at baseline.  Pt's O2 at home in 80s prompting him to come to ED.  In the ER, sats 89% on RA.  Patient started on 5L NC and sats improved to upper 90's.  WBC elevated @ 13.  DDimer high 2434.  CTA negative for PE but positive for bilateral ground glass opacities c/w COVID-19.  Patient received Decadron 6 mg IV x1 in the ER and admitted.      Tele shows brief PAF   Cannot give more of a history because he is nonverbal  PAST MEDICAL & SURGICAL HISTORY:  Liver dysfunction  CKD (chronic kidney disease)  CHF (congestive heart failure)  Hepatitis C    FAMILY HISTORY:  Unable to obtain as patient is minimally verbal.      Social History:    No present ETOH/ drug use/ tob use.    Lives at home with family.      Allergies:  No Known Allergies    Home Medications:  Albuterol (Eqv-Proventil HFA) 90 mcg/inh inhalation aerosol: 2 puff(s) inhaled every 6 hours, As Needed (17 Mar 2021 14:56)  Aspir 81 oral delayed release tablet: 1 tab(s) orally once a day (17 Mar 2021 14:56)  carvedilol 12.5 mg oral tablet: 1 tab(s) orally 2 times a day (17 Mar 2021 14:56)  Symbicort 160 mcg-4.5 mcg/inh inhalation aerosol: 2 puff(s) inhaled 2 times a day  ***DrFirst*** (17 Mar 2021 14:56)  Vitamin D3 1000 intl units (25 mcg) oral tablet: 1 tab(s) orally once a day (17 Mar 2021 14:56)  Zinc with copper: 1 tab(s) orally once a day (17 Mar 2021 14:56)           (17 Mar 2021 17:37)      PAST MEDICAL & SURGICAL HISTORY:  Liver dysfunction    CKD (chronic kidney disease)    CHF (congestive heart failure)    Hepatitis C                                      MEDICATIONS  (STANDING):  apixaban 5 milliGRAM(s) Oral every 12 hours  aspirin enteric coated 81 milliGRAM(s) Oral daily  atorvastatin 40 milliGRAM(s) Oral at bedtime  budesonide 160 MICROgram(s)/formoterol 4.5 MICROgram(s) Inhaler 2 Puff(s) Inhalation two times a day  carvedilol 12.5 milliGRAM(s) Oral every 12 hours  dexAMETHasone  Injectable 6 milliGRAM(s) IV Push daily  remdesivir  IVPB   IV Intermittent   remdesivir  IVPB 100 milliGRAM(s) IV Intermittent every 24 hours    MEDICATIONS  (PRN):  acetaminophen   Tablet .. 650 milliGRAM(s) Oral every 4 hours PRN Temp greater or equal to 38.5C (101.3F)  ALBUTerol    90 MICROgram(s) HFA Inhaler 2 Puff(s) Inhalation every 4 hours PRN Shortness of Breath and/or Wheezing  aluminum hydroxide/magnesium hydroxide/simethicone Suspension 30 milliLiter(s) Oral every 4 hours PRN Dyspepsia  benzonatate 100 milliGRAM(s) Oral three times a day PRN Cough  guaifenesin/dextromethorphan  Syrup 10 milliLiter(s) Oral every 4 hours PRN Cough  ondansetron Injectable 4 milliGRAM(s) IV Push every 6 hours PRN Nausea and/or Vomiting      FAMILY HISTORY:      SOCIAL HISTORY:    CIGARETTES:        Vital Signs Last 24 Hrs  T(C): 36.9 (19 Mar 2021 16:28), Max: 36.9 (19 Mar 2021 16:28)  T(F): 98.4 (19 Mar 2021 16:28), Max: 98.4 (19 Mar 2021 16:28)  HR: 79 (19 Mar 2021 16:28) (71 - 89)  BP: 161/72 (19 Mar 2021 16:28) (153/80 - 169/81)  BP(mean): --  RR: 18 (19 Mar 2021 16:28) (18 - 18)  SpO2: 91% (19 Mar 2021 16:28) (90% - 95%)            INTERPRETATION OF TELEMETRY: brief PSVT likely PAF    ECG:    I&O's Detail    18 Mar 2021 07:01  -  19 Mar 2021 07:00  --------------------------------------------------------  IN:  Total IN: 0 mL    OUT:    Voided (mL): 200 mL  Total OUT: 200 mL    Total NET: -200 mL          LABS:                        11.5   12.36 )-----------( 486      ( 18 Mar 2021 09:04 )             34.2     03-19    x   |  x   |  x   ----------------------------<  x   x    |  x   |  0.91    Ca    9.4      18 Mar 2021 09:04    TPro  7.3  /  Alb  2.1<L>  /  TBili  0.4  /  DBili  0.2  /  AST  54<H>  /  ALT  59  /  AlkPhos  121<H>  03-19        PT/INR - ( 19 Mar 2021 08:58 )   PT: 15.1 sec;   INR: 1.31 ratio           Urinalysis Basic - ( 18 Mar 2021 04:15 )    Color: Sparkle / Appearance: Clear / S.010 / pH: x  Gluc: x / Ketone: Trace  / Bili: Negative / Urobili: Negative   Blood: x / Protein: 100 / Nitrite: Negative   Leuk Esterase: Negative / RBC: 0-2 /HPF / WBC Negative   Sq Epi: x / Non Sq Epi: Occasional / Bacteria: Negative      I&O's Summary    18 Mar 2021 07:01  -  19 Mar 2021 07:00  --------------------------------------------------------  IN: 0 mL / OUT: 200 mL / NET: -200 mL      BNP  RADIOLOGY & ADDITIONAL STUDIES:

## 2021-03-19 NOTE — CONSULT NOTE ADULT - ASSESSMENT
81 y/o male with a PMHx of hepatitis C, liver dysfunction, chronic CHF, CAD CABG , CKD stage III who presents to the ED c/o SOB.  Pt tested COVID + on 3/7/21.  As per PMD, patient's girlfriend also has COVID.  PMD reports pt is a poor historian at baseline.  Pt's O2 at home in 80s prompting him to come to ED.  In the ER, sats 89% on RA.  Patient started on 5L NC and sats improved to upper 90's.  WBC elevated @ 13.  DDimer high 2434.  CTA negative for PE but positive for bilateral ground glass opacities c/w COVID-19.  Patient received Decadron 6 mg IV x1 in the ER and admitted.      Tele shows brief PAF   Cannot give more of a history because he is nonverbal  1) cont eliquis  2) check ECho if low EF start ACEI in addition to bblocker  3) findings on CT likely COVID and not CHF but can use PRN lasix if needed  Will sign off , please call us back for further questions . Thank you

## 2021-03-19 NOTE — PHYSICAL THERAPY INITIAL EVALUATION ADULT - FOLLOWS COMMANDS/ANSWERS QUESTIONS, REHAB EVAL
responds to some cueing when cup/spoon offered  by opening mouth ,pursing lips/unable to follow commands/unable to answer questions/aphasia

## 2021-03-19 NOTE — CDI QUERY NOTE - NSCDIOTHERTXTBX_GEN_ALL_CORE_HH
Patient admitted with Covid-19  acute hypoxic respiratory failure  Multifocal pneumonia  CVA with metabolic encephalopathy    On admission : WBC= 13.71  Temp= 101.1  HR= 118  RR= 28  O2 sat= 89%  BP= 173/77    Please clarify if the above clinical indicators are indicative of a further diagnosis  a) Sepsis, present on admission due to Covid -19 and multifocal pneumonia  b) No clinical indication of Sepsis  c) Other, please clarify

## 2021-03-19 NOTE — PROGRESS NOTE ADULT - SUBJECTIVE AND OBJECTIVE BOX
History of Present Illness:   79 y/o male with a PMHx of hepatitis C, liver dysfunction, chronic CHF, CKD stage III who presents to the ED c/o SOB.  Pt tested COVID + on 3/7/21.  As per PMD, patient's girlfriend also has COVID.  PMD reports pt is a poor historian at baseline.  Pt's O2 at home in 80s prompting him to come to ED.  In the ER, sats 89% on RA.  Patient started on 5L NC and sats improved to upper 90's.  WBC elevated @ 13.  DDimer high 2434.  CTA negative for PE but positive for bilateral ground glass opacities c/w COVID-19.  Patient received Decadron 6 mg IV x1 in the ER and admitted.    Hosp course sign for acute resp failure and Acute L CVA and Afib    3.18: confused, non verbal  3.19: hypoxic, aphasic         REVIEW OF SYSTEMS:  unable to obtain due to confusion   All other review of systems is negative unless indicated above    Vital Signs Last 24 Hrs  T(C): 36.3 (19 Mar 2021 09:20), Max: 36.6 (18 Mar 2021 17:50)  T(F): 97.3 (19 Mar 2021 09:20), Max: 97.8 (18 Mar 2021 17:50)  HR: 71 (19 Mar 2021 09:20) (71 - 89)  BP: 160/75 (19 Mar 2021 09:20) (153/80 - 169/81)  RR: 18 (19 Mar 2021 09:20) (18 - 18)  SpO2: 94% (19 Mar 2021 09:20) (90% - 95%)    PHYSICAL EXAM:    GENERAL: NAD, Well nourished  HEENT:  NC/AT, EOMI, PERRLA, No scleral icterus, Moist mucous membranes  NECK: Supple, No JVD  CNS:  Alert & Oriented X0, non verbal, Rt side weakness noted   LUNG: Normal Breath sounds, Clear to auscultation bilaterally, No rales, No rhonchi, No wheezing  HEART: RRR; No murmurs, No rubs  ABDOMEN: +BS, ST/ND/NT  GENITOURINARY: Voiding, Bladder not distended  EXTREMITIES:  2+ Peripheral Pulses, No clubbing, No cyanosis, No tibial edema  MUSCULOSKELTAL: Joints normal ROM, No TTP, No effusion  SKIN: no rashes  RECTAL: deferred, not indicated  BREAST: deferred               Labs:                        11.5   12.36 )-----------( 486      ( 18 Mar 2021 09:04 )             34.2     03-19    x   |  x   |  x   ----------------------------<  x   x    |  x   |  0.91    Ca    9.4      18 Mar 2021 09:04    TPro  7.3  /  Alb  2.1<L>  /  TBili  0.4  /  DBili  0.2  /  AST  54<H>  /  ALT  59  /  AlkPhos  121<H>  03-19             MEDICATIONS  (STANDING):  aspirin enteric coated 81 milliGRAM(s) Oral daily  budesonide 160 MICROgram(s)/formoterol 4.5 MICROgram(s) Inhaler 2 Puff(s) Inhalation two times a day  carvedilol 12.5 milliGRAM(s) Oral every 12 hours  dexAMETHasone  Injectable 6 milliGRAM(s) IV Push daily  enoxaparin Injectable 40 milliGRAM(s) SubCutaneous daily  remdesivir  IVPB   IV Intermittent   remdesivir  IVPB 200 milliGRAM(s) IV Intermittent every 24 hours    MEDICATIONS  (PRN):  acetaminophen   Tablet .. 650 milliGRAM(s) Oral every 4 hours PRN Temp greater or equal to 38.5C (101.3F)  ALBUTerol    90 MICROgram(s) HFA Inhaler 2 Puff(s) Inhalation every 4 hours PRN Shortness of Breath and/or Wheezing  aluminum hydroxide/magnesium hydroxide/simethicone Suspension 30 milliLiter(s) Oral every 4 hours PRN Dyspepsia  benzonatate 100 milliGRAM(s) Oral three times a day PRN Cough  guaifenesin/dextromethorphan  Syrup 10 milliLiter(s) Oral every 4 hours PRN Cough  ondansetron Injectable 4 milliGRAM(s) IV Push every 6 hours PRN Nausea and/or Vomiting      all labs reviewed  all imaging reviewed          A/P    79 y/o male with a PMHx of hepatitis C, liver dysfunction, chronic CHF, CKD stage III who presents to the ED with progessive hypoxia secondary to COVID-19.      # Sepsis POA and Acute Hypoxic Respiratory failure 2/2 COVID19:    - Pulseox q4 hours.    - Cont O2 supplemental @5L and maintain SpO2 between 88-94%  - if requiring >6L NC-> switch to 100% NRB or HFNC  - IV decadron 6mg IV qd for 5-10 days  - IV Remdezavir   - F/u inflammatory markers. Monitor Q72hrs  - CTA negative for PE.    - Albuterol MDI to limit aerosolization    #Metabolic Encephalopathy due to Acute Left CVA:  not a candidate for TPA due to onset of symptoms>6hrs   ASA/Statins  Neuro evaluation   Telemetry: Afib  will start Apixaban 5mg po bid   Speech and Swallow eval, PT evaluation   Echocardiogram      #Leukocytosis:    WBC usually normal with COVID.    Check UA/ urine culture to r/o UTI.    Repeat labs in am.    Hold on ABX for now.      #Hepatitis C/ Liver Dysfunction:    Stable.  Follow.      #Chronic CHF:    Unknown if diastolic/ systolic.    Cont home meds-- coreg.      #DVT Proph:    Apixaban    #Advanced Directives:  Xavier (patient's brother) [phone].  He is patient's POA.  Patient is FULL CODE.

## 2021-03-19 NOTE — PROVIDER CONTACT NOTE (OTHER) - ACTION/TREATMENT ORDERED:
will look into patient and follow up with necessary consult
will order wrist restraints, continue to monitor pt

## 2021-03-19 NOTE — PHYSICAL THERAPY INITIAL EVALUATION ADULT - PERTINENT HX OF CURRENT PROBLEM, REHAB EVAL
pt developed slurred speech, hesitant & decreased speech output per family with just "Yes/No "responses on 3/17 morning,on 3/18 developed symptoms of R sided weakness ; pt not a candidate for tPA (out of window)

## 2021-03-19 NOTE — PROGRESS NOTE ADULT - SUBJECTIVE AND OBJECTIVE BOX
Interval History:  3/19/21: Patient remains with aphasia    MEDICATIONS  (STANDING):  apixaban 5 milliGRAM(s) Oral every 12 hours  aspirin enteric coated 81 milliGRAM(s) Oral daily  atorvastatin 40 milliGRAM(s) Oral at bedtime  budesonide 160 MICROgram(s)/formoterol 4.5 MICROgram(s) Inhaler 2 Puff(s) Inhalation two times a day  carvedilol 12.5 milliGRAM(s) Oral every 12 hours  dexAMETHasone  Injectable 6 milliGRAM(s) IV Push daily  remdesivir  IVPB   IV Intermittent   remdesivir  IVPB 100 milliGRAM(s) IV Intermittent every 24 hours    MEDICATIONS  (PRN):  acetaminophen   Tablet .. 650 milliGRAM(s) Oral every 4 hours PRN Temp greater or equal to 38.5C (101.3F)  ALBUTerol    90 MICROgram(s) HFA Inhaler 2 Puff(s) Inhalation every 4 hours PRN Shortness of Breath and/or Wheezing  aluminum hydroxide/magnesium hydroxide/simethicone Suspension 30 milliLiter(s) Oral every 4 hours PRN Dyspepsia  benzonatate 100 milliGRAM(s) Oral three times a day PRN Cough  guaifenesin/dextromethorphan  Syrup 10 milliLiter(s) Oral every 4 hours PRN Cough  ondansetron Injectable 4 milliGRAM(s) IV Push every 6 hours PRN Nausea and/or Vomiting      Allergies    contrast media (iodine-based) (Anaphylaxis)  heparin containing compounds (Anaphylaxis; Other (Severe))    Intolerances        PHYSICAL EXAM:  Vital Signs Last 24 Hrs  T(F): 97.3 (21 @ 09:20)  HR: 71 (21 @ 09:20)  BP: 160/75 (21 @ 09:20)  RR: 18 (21 @ 09:20)    GENERAL: NAD, well-groomed, well-developed  HEAD:  Atraumatic, Normocephalic  Neuro:  Awake, alert, global aphasia  CN: PERRL, EOMI, no nystagmus, no facial weakness, tongue protrudes in the midline  motor: full strength in LUE and LLE. RUE 4+/5, Moves RLE less briskly than left  sensory: withdraws in all four extremities  coordination: no involuntary movements noted  DTRs: symmetric, plantar responses flexor bilaterally    LABS:                        11.5   12.36 )-----------( 486      ( 18 Mar 2021 09:04 )             34.2     03-19    x   |  x   |  x   ----------------------------<  x   x    |  x   |  0.91    Ca    9.4      18 Mar 2021 09:04    TPro  7.3  /  Alb  2.1<L>  /  TBili  0.4  /  DBili  0.2  /  AST  54<H>  /  ALT  59  /  AlkPhos  121<H>  03-19    PT/INR - ( 19 Mar 2021 08:58 )   PT: 15.1 sec;   INR: 1.31 ratio           Urinalysis Basic - ( 18 Mar 2021 04:15 )    Color: Sparkle / Appearance: Clear / S.010 / pH: x  Gluc: x / Ketone: Trace  / Bili: Negative / Urobili: Negative   Blood: x / Protein: 100 / Nitrite: Negative   Leuk Esterase: Negative / RBC: 0-2 /HPF / WBC Negative   Sq Epi: x / Non Sq Epi: Occasional / Bacteria: Negative        RADIOLOGY & ADDITIONAL STUDIES:    CT head no contrast 3/17/21:  Suspected chronic sinusitis.  No acute hemorrhage or midline shift. If symptoms persist consider follow up head CT or MRI if no contraindications.    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

## 2021-03-19 NOTE — PROGRESS NOTE ADULT - ASSESSMENT
81 y/o male with a PMHx of hepatitis C, liver dysfunction, chronic CHF, CKD stage III who presents to the ED c/o SOB.  Pt tested COVID + on 3/7/21.   He was sent to the ED on 3/17/21 after family reported to his PCP that he had decreased O2 sats.  He was noted to have paucity of speech as early as 3/17 in the AM and was noted to have right sided weakness around 12 PM today.  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    Stroke  The patient was outside of the window for TPA  He was also outside of the window for any intervention such as thrombectomy. Nevertheless, he is unlikely to have a large vessel occlusion based on the findings on MRI brain.  He has a history of significant allergy to IV contrast and since any intervention is unlikely, the risks of contrast outweigh the benefits at this time.  This was explained to his health care proxy.    -MRA head and neck attempted, but was non-diagnostic due to patient's inability to tolerate exam. Can repeat in future  -Stroke in setting of covid-19 with elevated d-dimer. Eliquis started  -Continue eliquis  -Continue aspirin 81 mg/day  -Permissive hypertension  -Electrophysiology consult appreciated - Stroke with pAF - continue Eliquis  -Speech pathology eval appreciated.    Dr. Marroquin will take over neuro service on 3/20 and will f/u as needed

## 2021-03-19 NOTE — SWALLOW BEDSIDE ASSESSMENT ADULT - MODE OF PRESENTATION
Assist was provided to feed as bilateral wrist restraints were in place and he was variably distractible./fed by clinician

## 2021-03-19 NOTE — PHYSICAL THERAPY INITIAL EVALUATION ADULT - LEVEL OF INDEPENDENCE: SIT/STAND, REHAB EVAL
not tested on this encounter for safety reasons; pt with R hemineglect and on 50% VM ,decided to focus on seated activities /balance ; he sat up at EOB >20 mins with CG/CCGx1

## 2021-03-19 NOTE — PHYSICAL THERAPY INITIAL EVALUATION ADULT - DIAGNOSIS, PT EVAL
Covid 19 viral infection, h/o Hepatitis C/liver dysfxn ,+coagulopathy, elevated D-Dimer (now on Eliquis) +L CVA/R hemiparesis,?visual field cut R ,+R dolores -neglect ,global aphasia

## 2021-03-19 NOTE — PHYSICAL THERAPY INITIAL EVALUATION ADULT - ACTIVE RANGE OF MOTION EXAMINATION, REHAB EVAL
AA/PROM RUE/RLE WNL/Left UE Active ROM was WNL (within normal limits)/LLE Active ROM was WNL (within normal limits)/deficits as listed below

## 2021-03-19 NOTE — SWALLOW BEDSIDE ASSESSMENT ADULT - SWALLOW EVAL: THERAPY FREQUENCY
Contacted patient about a sooner appointment in [facility] with Dr. Pedraza due to swollen legs.  Patient verbalize understanding.   as schedule permits

## 2021-03-19 NOTE — PHYSICAL THERAPY INITIAL EVALUATION ADULT - RANGE OF MOTION EXAMINATION, REHAB EVAL
wrist restraints removed for PT /ROM,decreased spontaneous active movement observed RUE/RLE and does not move to command,+R hemineglect ,

## 2021-03-19 NOTE — SWALLOW BEDSIDE ASSESSMENT ADULT - COMMENTS
The pt was initially admitted to  with SOB and mental status change. Hospital course is notable for leukocytosis, COVID-19, multi focal pneumonia, and altered mentation/communicative abilities with brain imaging revealing acute watershed infarcts on left. This profile is superimposed upon a h/o Hep C, CAD, CHF, HTN, CKD and thrombocytopenia. The pt was initially admitted to  with SOB and mental status change. Hospital course is notable for leukocytosis, COVID-19, multi focal pneumonia, and altered mentation/communicative abilities with brain imaging revealing acute watershed cerebral infarcts on left. This profile is superimposed upon a h/o Hep C, CAD, CHF, HTN, CKD and thrombocytopenia.

## 2021-03-19 NOTE — CDI QUERY NOTE - NSCDINOTEPOA_GEN_ALL_CORE_FT
Was the condition present on admission? If so, please document in the chart that “(the condition) was present on admission.”
Stable

## 2021-03-19 NOTE — PHYSICAL THERAPY INITIAL EVALUATION ADULT - PRECAUTIONS/LIMITATIONS, REHAB EVAL
VM ,urinary catheter; allergy to CONTRAST/aspiration precautions/fall precautions/isolation precautions/oxygen therapy device and L/min/swallowing precautions

## 2021-03-19 NOTE — PHYSICAL THERAPY INITIAL EVALUATION ADULT - GENERAL OBSERVATIONS, REHAB EVAL
resting supine in bed with B wrist restarints,50% VM /O2 ,awake, seemimgly alert but non-verbal on this encounter ,L gaze preference,oral mucosa appear dry ,lunch tray at bedside not consumed & ice cold

## 2021-03-19 NOTE — PROVIDER CONTACT NOTE (OTHER) - REASON
Primary Care Provider
pt with short bursts of svt overnight
pt placed on 50% venti mask and removing, needs b/l wrist restraints

## 2021-03-19 NOTE — PHYSICAL THERAPY INITIAL EVALUATION ADULT - IMPAIRMENTS FOUND, PT EVAL
difficult to assess sensory integrity/arousal, attention, and cognition/decreased midline orientation/fine motor/gait, locomotion, and balance/gross motor/muscle strength/poor safety awareness/ROM/tone

## 2021-03-19 NOTE — SWALLOW BEDSIDE ASSESSMENT ADULT - NS SPL SWALLOW CLINIC TRIAL FT
NO BEHAVIORAL ASPIRATION SIGNS EXHIBITED. PT DID NOT APPEAR TO DEMONSTRATE ANY OVERT SWALLOWING DISCOMFORT WHEN EATING.

## 2021-03-19 NOTE — PROGRESS NOTE ADULT - ASSESSMENT
79 y/o male with h/o chronic hepatitis C, liver dysfunction, chronic CHF, CKD stage III, recently diagnosed with COVID-19 viral syndrome was admitted on 3/17 for worsening SOB.  Pt tested COVID + on 3/7/21. The pt is a poor historian at baseline. Pt's O2 at home in 80s prompting him to come to ED. In the ER, sats 89% on RA.  Patient started on 5L NC and sats improved to upper 90's.     1. COVID-19 viral syndrome. Acute respiratory failure. Multifocal pneumonia.   -febrile syndrome improving  -leukocytosis  -on remdesivir protocol # 2  -tolerating abx well so far; no side effects noted  -O2 therapy  -AC  -steroids  -respiratory care  -droplet isolation  -continue antiviral therapy  -monitor temps  -f/u CBC  -supportive care  2. Other issues:   -care per medicine

## 2021-03-19 NOTE — CONSULT NOTE ADULT - ASSESSMENT
79 y/o male with a PMHx of hepatitis C, liver dysfunction, chronic CHF, CKD stage III who presents to the ED c/o SOB.  Pt tested COVID + on 3/7/21 and is hospitalized for acute covid infection, pna   Pt has frequent PVC 79 y/o male with a PMHx of hepatitis C, liver dysfunction, chronic CHF, CAD CABG,  who presents to the ED c/o SOB.  Pt tested COVID + on 3/7/21, patient had CVA with acute aphasia and inability to follow commands, requiring restrains because he pools mask out,  currently hypoxic requiring O2 via mask.   -pAF and CVA elevated chadsvasc , continue lifelong eliquis 5mg bid, tele reviewed no sustained arrhythmia   -continue coreg for HTN and rate control   -tte for lv function  -covid infection continue supportive care as per primary team  poor prognosis

## 2021-03-19 NOTE — PHYSICAL THERAPY INITIAL EVALUATION ADULT - PASSIVE RANGE OF MOTION EXAMINATION, REHAB EVAL
Right UE Passive ROM was WNL (within normal limits)/Left LE Passive ROM was WNL (within normal limits)

## 2021-03-19 NOTE — SWALLOW BEDSIDE ASSESSMENT ADULT - SWALLOW EVAL: RECOMMENDED DIET
SUGGEST A REGULAR TEXTURE DIET WITH THIN LIQUID CONSISTENCIES AS HE TOLERATES THESE FOOD TEXTURES FROM AN OROPHARYNGEAL SWALLOWING STANCE WHEN ALERT/ASSISTED TO EAT.

## 2021-03-19 NOTE — PHYSICAL THERAPY INITIAL EVALUATION ADULT - LEVEL OF INDEPENDENCE: SIT/SUPINE, REHAB EVAL
wrist restraints replaced after Rx and heels offloaded B ,RUE elevated on pillow to bring into visual field,discourage neglect/moderate assist (50% patients effort)/maximum assist (25% patients effort)

## 2021-03-19 NOTE — SWALLOW BEDSIDE ASSESSMENT ADULT - ADDITIONAL RECOMMENDATIONS
1) Neuro f/u    2) Nutrition f/u     3) Assist with feeding and ensure maxillary denture in place when he is eating.

## 2021-03-20 NOTE — PROGRESS NOTE ADULT - SUBJECTIVE AND OBJECTIVE BOX
History of Present Illness:   81 y/o male with a PMHx of hepatitis C, liver dysfunction, chronic CHF, CKD stage III who presents to the ED c/o SOB.  Pt tested COVID + on 3/7/21.  As per PMD, patient's girlfriend also has COVID.  PMD reports pt is a poor historian at baseline.  Pt's O2 at home in 80s prompting him to come to ED.  In the ER, sats 89% on RA.  Patient started on 5L NC and sats improved to upper 90's.  WBC elevated @ 13.  DDimer high 2434.  CTA negative for PE but positive for bilateral ground glass opacities c/w COVID-19.  Patient received Decadron 6 mg IV x1 in the ER and admitted.    Hosp course sign for acute resp failure and Acute L CVA and Afib    3.18: confused, non verbal  3.19: hypoxic, aphasic   3.20:  no change in status, remains aphasic, removing face mask         REVIEW OF SYSTEMS:  unable to obtain due to confusion   All other review of systems is negative unless indicated above    Vital Signs Last 24 Hrs  T(C): 36.3 (20 Mar 2021 09:16), Max: 36.9 (19 Mar 2021 16:28)  T(F): 97.4 (20 Mar 2021 09:16), Max: 98.4 (19 Mar 2021 16:28)  HR: 85 (20 Mar 2021 09:16) (72 - 91)  BP: 170/90 (20 Mar 2021 09:16) (151/69 - 170/90)  BP(mean): 112 (20 Mar 2021 09:16) (112 - 112)  RR: 18 (20 Mar 2021 09:16) (18 - 18)  SpO2: 92% (20 Mar 2021 09:16) (91% - 94%)    PHYSICAL EXAM:    GENERAL: NAD, Well nourished  HEENT:  NC/AT, EOMI, PERRLA, No scleral icterus, Moist mucous membranes  NECK: Supple, No JVD  CNS:  Alert & Oriented X0, non verbal, Rt side weakness noted   LUNG: Normal Breath sounds, Clear to auscultation bilaterally, No rales, No rhonchi, No wheezing  HEART: RRR; No murmurs, No rubs  ABDOMEN: +BS, ST/ND/NT  GENITOURINARY: Voiding, Bladder not distended  EXTREMITIES:  2+ Peripheral Pulses, No clubbing, No cyanosis, No tibial edema  MUSCULOSKELTAL: Joints normal ROM, No TTP, No effusion  SKIN: no rashes  RECTAL: deferred, not indicated  BREAST: deferred               Labs:                        11.5   12.36 )-----------( 486      ( 18 Mar 2021 09:04 )             34.2     03-19    x   |  x   |  x   ----------------------------<  x   x    |  x   |  0.91    Ca    9.4      18 Mar 2021 09:04    TPro  7.3  /  Alb  2.1<L>  /  TBili  0.4  /  DBili  0.2  /  AST  54<H>  /  ALT  59  /  AlkPhos  121<H>  03-19             MEDICATIONS  (STANDING):  aspirin enteric coated 81 milliGRAM(s) Oral daily  budesonide 160 MICROgram(s)/formoterol 4.5 MICROgram(s) Inhaler 2 Puff(s) Inhalation two times a day  carvedilol 12.5 milliGRAM(s) Oral every 12 hours  dexAMETHasone  Injectable 6 milliGRAM(s) IV Push daily  enoxaparin Injectable 40 milliGRAM(s) SubCutaneous daily  remdesivir  IVPB   IV Intermittent   remdesivir  IVPB 200 milliGRAM(s) IV Intermittent every 24 hours    MEDICATIONS  (PRN):  acetaminophen   Tablet .. 650 milliGRAM(s) Oral every 4 hours PRN Temp greater or equal to 38.5C (101.3F)  ALBUTerol    90 MICROgram(s) HFA Inhaler 2 Puff(s) Inhalation every 4 hours PRN Shortness of Breath and/or Wheezing  aluminum hydroxide/magnesium hydroxide/simethicone Suspension 30 milliLiter(s) Oral every 4 hours PRN Dyspepsia  benzonatate 100 milliGRAM(s) Oral three times a day PRN Cough  guaifenesin/dextromethorphan  Syrup 10 milliLiter(s) Oral every 4 hours PRN Cough  ondansetron Injectable 4 milliGRAM(s) IV Push every 6 hours PRN Nausea and/or Vomiting      all labs reviewed  all imaging reviewed          A/P    81 y/o male with a PMHx of hepatitis C, liver dysfunction, chronic CHF, CKD stage III who presents to the ED with progessive hypoxia secondary to COVID-19.      # Sepsis POA and Acute Hypoxic Respiratory failure 2/2 COVID19:    - Pulseox q4 hours.    - Cont O2 supplemental @5L and maintain SpO2 between 88-94%  - if requiring >6L NC-> switch to 100% NRB or HFNC  - IV decadron 6mg IV qd for 5-10 days  - IV Remdezavir   - F/u inflammatory markers. Monitor Q72hrs  - CTA negative for PE.    - Albuterol MDI to limit aerosolization    #Metabolic Encephalopathy due to Acute Left CVA:  not a candidate for TPA due to onset of symptoms>6hrs   ASA/Statins  Neuro evaluation   Telemetry: Afib  will start Apixaban 5mg po bid   Speech and Swallow eval, PT evaluation   Echocardiogram      #Leukocytosis:    WBC usually normal with COVID.    Check UA/ urine culture to r/o UTI.    Repeat labs in am.    Hold on ABX for now.      #Hepatitis C/ Liver Dysfunction:    Stable.  Follow.      #Chronic CHF:    Unknown if diastolic/ systolic.    Cont home meds-- coreg.      #DVT Proph:    Apixaban    #Advanced Directives:  Xavier (patient's brother) [phone].  He is patient's POA.  Patient is FULL CODE.

## 2021-03-21 NOTE — PROVIDER CONTACT NOTE (CHANGE IN STATUS NOTIFICATION) - SITUATION
Notified by nursing assistant that "patient doesn't look good." Upon assessment, RR >40, labored, shallow, with access. muscle use. Pt nonverbal, and nonresponsive. RRT called. Notified by nursing assistant that "patient doesn't look good." Upon assessment, RR >40, labored, shallow, with access. muscle use. Pt nonverbal, unable to communicate. RRT called.

## 2021-03-21 NOTE — CONSULT NOTE ADULT - ASSESSMENT
79 y/o male with pmhx of hepatitis C, CKD III, HFrEF (35%), poor historian admitted with acute hypoxic respiratory failure due to COVID-19 with hospital course complicated by acute left embolic CVAs due to afib.    1. acute hypoxic respiratory failure  2. COVID-19   3. AMS  4. acute CVA  5. afib    NEURO: sedated with propofol. repeat stat CT head to r/o ICH. if stable then wean sedatives to appropriately access mental status. MRA head/neck can be redone if necessary now that intubated. continue asa, statin. was outside window of tpa. cta deferred due to allergy contrast (anaphylaxis, cardiac arrest)    CVS: pending CT head, continue eliquis for full a/c. rate controlled on coreg. TTE during this hospitalization with EF of 35%.     PULM: during the day was escalated from 5 liters NC to 50% ventimask. on 10 day decadron course. repeat CXR pending, f/u infiltrates. continue remdesevir. maintain spo2 > 92%. prone as needed for PF < 150 but fio2 already weaned to 50% and peep at 5 can likely come down further on fio2. abg pending.     GI: npo with tube feeds.   RENAL: Cr stable. keep K > 4, Mg > 2  ID: as above. monitor off abx  ENDO: accuchecks q 6 hours. check TSH  HEME: on eliquis  DISPO: full code. updated patients brother josefina  81 y/o male with pmhx of hepatitis C, CKD III, HFrEF (35%), poor historian admitted with acute hypoxic respiratory failure due to COVID-19 with hospital course complicated by acute left embolic CVAs due to afib.    1. acute hypoxic respiratory failure  2. COVID-19   3. AMS  4. acute CVA  5. afib    NEURO: sedated with propofol. repeat stat CT head to r/o ICH. if stable then wean sedatives to appropriately access mental status. MRA head/neck can be redone if necessary now that intubated. continue asa, statin. was outside window of tpa. cta deferred due to allergy contrast (anaphylaxis, cardiac arrest)    CVS: pending CT head, continue eliquis for full a/c. rate controlled on coreg. TTE during this hospitalization with EF of 35%.     PULM: during the day was escalated from 5 liters NC to 50% ventimask. on 10 day decadron course. repeat CXR pending, f/u infiltrates. continue remdesevir. maintain spo2 > 92%. prone as needed for PF < 150 but fio2 already weaned to 50% and peep at 5 can likely come down further on fio2. abg pending.     GI: npo with tube feeds.   RENAL: Cr stable. keep K > 4, Mg > 2  ID: as above. monitor off abx  ENDO: accuchecks q 6 hours. check TSH  HEME: on eliquis  DISPO: full code. updated patients brother josefina    discussed with eicu dr lima

## 2021-03-21 NOTE — PROGRESS NOTE ADULT - SUBJECTIVE AND OBJECTIVE BOX
History of Present Illness:   79 y/o male with a PMHx of hepatitis C, liver dysfunction, chronic CHF, CKD stage III who presents to the ED c/o SOB.  Pt tested COVID + on 3/7/21.  As per PMD, patient's girlfriend also has COVID.  PMD reports pt is a poor historian at baseline.  Pt's O2 at home in 80s prompting him to come to ED.  In the ER, sats 89% on RA.  Patient started on 5L NC and sats improved to upper 90's.  WBC elevated @ 13.  DDimer high 2434.  CTA negative for PE but positive for bilateral ground glass opacities c/w COVID-19.  Patient received Decadron 6 mg IV x1 in the ER and admitted.    Hosp course sign for acute resp failure and Acute L CVA and Afib    3.18: confused, non verbal  3.19: hypoxic, aphasic   3.20:  no change in status, remains aphasic, removing face mask   3.21: more lethargic today; aphasic, not arousal not following commands         REVIEW OF SYSTEMS:  unable to obtain due to lethargy   All other review of systems is negative unless indicated above    Vital Signs Last 24 Hrs  T(C): 37.3 (21 Mar 2021 08:32), Max: 37.3 (21 Mar 2021 08:32)  T(F): 99.2 (21 Mar 2021 08:32), Max: 99.2 (21 Mar 2021 08:32)  HR: 110 (21 Mar 2021 08:32) (85 - 110)  BP: 161/85 (21 Mar 2021 08:32) (155/94 - 174/92)  RR: 19 (21 Mar 2021 08:32) (18 - 19)  SpO2: 94% (21 Mar 2021 08:32) (94% - 98%)    PHYSICAL EXAM:    GENERAL: NAD, Well nourished  HEENT:  NC/AT, EOMI, PERRLA, No scleral icterus, Moist mucous membranes  NECK: Supple, No JVD  CNS:  Alert & Oriented X0, non verbal, Rt side weakness noted   LUNG: Normal Breath sounds, Clear to auscultation bilaterally, No rales, No rhonchi, No wheezing  HEART: RRR; No murmurs, No rubs  ABDOMEN: +BS, ST/ND/NT  GENITOURINARY: Voiding, Bladder not distended  EXTREMITIES:  2+ Peripheral Pulses, No clubbing, No cyanosis, No tibial edema  MUSCULOSKELTAL: Joints normal ROM, No TTP, No effusion  SKIN: no rashes  RECTAL: deferred, not indicated  BREAST: deferred               Labs:                        11.5   12.36 )-----------( 486      ( 18 Mar 2021 09:04 )             34.2     03-19    x   |  x   |  x   ----------------------------<  x   x    |  x   |  0.91    Ca    9.4      18 Mar 2021 09:04    TPro  7.3  /  Alb  2.1<L>  /  TBili  0.4  /  DBili  0.2  /  AST  54<H>  /  ALT  59  /  AlkPhos  121<H>  03-19             MEDICATIONS  (STANDING):  apixaban 5 milliGRAM(s) Oral every 12 hours  aspirin enteric coated 81 milliGRAM(s) Oral daily  atorvastatin 40 milliGRAM(s) Oral at bedtime  budesonide 160 MICROgram(s)/formoterol 4.5 MICROgram(s) Inhaler 2 Puff(s) Inhalation two times a day  carvedilol 12.5 milliGRAM(s) Oral every 12 hours  dexAMETHasone  Injectable 6 milliGRAM(s) IV Push daily  remdesivir  IVPB 100 milliGRAM(s) IV Intermittent every 24 hours      all labs reviewed  all imaging reviewed          A/P    79 y/o male with a PMHx of hepatitis C, liver dysfunction, chronic CHF, CKD stage III who presents to the ED with processive hypoxia secondary to COVID-19.      # Sepsis POA and Acute Hypoxic Respiratory failure 2/2 COVID19:    - Pulse ox q4 hours.    - Cont O2 supplemental and  maintain SpO2 between 88-94%  - IV decadron 6mg IV qd for 5-10 days  - IV Remdezavir   - High inflammatory markers. Monitor Q72hrs  - CTA negative for PE.    - Albuterol MDI to limit aerosolization    #Metabolic Encephalopathy due to multiple Acute Left frontal and parietal CVA:  initially not a candidate for TPA due to onset of symptoms>6hrs   ASA/Statins/Apixaban   repeat CT noted  Neuro evaluation noted  Telemetry: Afib  Speech and Swallow eval, PT evaluation   Echocardiogram      #Afib:  apixaban  BBlockers     # Chronic systolic left Chf:  Lasix as needed   Coreg   ACEinh when BP stable    #Hepatitis C/ Liver Dysfunction:    Stable.  Follow.      #DVT Proph:    Apixaban    #Advanced Directives:  Xavier (patient's brother) [phone].  He is patient's POA.  Patient is FULL CODE.   History of Present Illness:   81 y/o male with a PMHx of hepatitis C, liver dysfunction, chronic CHF, CKD stage III who presents to the ED c/o SOB.  Pt tested COVID + on 3/7/21.  As per PMD, patient's girlfriend also has COVID.  PMD reports pt is a poor historian at baseline.  Pt's O2 at home in 80s prompting him to come to ED.  In the ER, sats 89% on RA.  Patient started on 5L NC and sats improved to upper 90's.  WBC elevated @ 13.  DDimer high 2434.  CTA negative for PE but positive for bilateral ground glass opacities c/w COVID-19.  Patient received Decadron 6 mg IV x1 in the ER and admitted.    Hosp course sign for acute resp failure and Acute L CVA and Afib    3.18: confused, non verbal  3.19: hypoxic, aphasic   3.20:  no change in status, remains aphasic, removing face mask   3.21: more lethargic today; aphasic, not arousal not following commands         REVIEW OF SYSTEMS:  unable to obtain due to lethargy   All other review of systems is negative unless indicated above    Vital Signs Last 24 Hrs  T(C): 37.3 (21 Mar 2021 08:32), Max: 37.3 (21 Mar 2021 08:32)  T(F): 99.2 (21 Mar 2021 08:32), Max: 99.2 (21 Mar 2021 08:32)  HR: 110 (21 Mar 2021 08:32) (85 - 110)  BP: 161/85 (21 Mar 2021 08:32) (155/94 - 174/92)  RR: 19 (21 Mar 2021 08:32) (18 - 19)  SpO2: 94% (21 Mar 2021 08:32) (94% - 98%)    PHYSICAL EXAM:    GENERAL: NAD, Well nourished  HEENT:  NC/AT, EOMI, PERRLA, No scleral icterus, Moist mucous membranes  NECK: Supple, No JVD  CNS:  Alert & Oriented X0, non verbal, Rt side weakness noted   LUNG: Normal Breath sounds, Clear to auscultation bilaterally, No rales, No rhonchi, No wheezing  HEART: RRR; No murmurs, No rubs  ABDOMEN: +BS, ST/ND/NT  GENITOURINARY: Voiding, Bladder not distended  EXTREMITIES:  2+ Peripheral Pulses, No clubbing, No cyanosis, No tibial edema  MUSCULOSKELTAL: Joints normal ROM, No TTP, No effusion  SKIN: no rashes  RECTAL: deferred, not indicated  BREAST: deferred               Labs:                        11.5   12.36 )-----------( 486      ( 18 Mar 2021 09:04 )             34.2     03-19    x   |  x   |  x   ----------------------------<  x   x    |  x   |  0.91    Ca    9.4      18 Mar 2021 09:04    TPro  7.3  /  Alb  2.1<L>  /  TBili  0.4  /  DBili  0.2  /  AST  54<H>  /  ALT  59  /  AlkPhos  121<H>  03-19             MEDICATIONS  (STANDING):  apixaban 5 milliGRAM(s) Oral every 12 hours  aspirin enteric coated 81 milliGRAM(s) Oral daily  atorvastatin 40 milliGRAM(s) Oral at bedtime  budesonide 160 MICROgram(s)/formoterol 4.5 MICROgram(s) Inhaler 2 Puff(s) Inhalation two times a day  carvedilol 12.5 milliGRAM(s) Oral every 12 hours  dexAMETHasone  Injectable 6 milliGRAM(s) IV Push daily  remdesivir  IVPB 100 milliGRAM(s) IV Intermittent every 24 hours      all labs reviewed  all imaging reviewed          A/P    81 y/o male with a PMHx of hepatitis C, liver dysfunction, chronic CHF, CKD stage III who presents to the ED with processive hypoxia secondary to COVID-19.      # Sepsis POA and Acute Hypoxic Respiratory failure 2/2 COVID19:    - Pulse ox q4 hours.    - Cont O2 supplemental and  maintain SpO2 between 88-94%  - IV decadron 6mg IV qd for 5-10 days  - IV Remdezavir   - High inflammatory markers. Monitor Q72hrs  - CTA negative for PE.    - Albuterol MDI to limit aerosolization    #Metabolic Encephalopathy due to multiple Acute Left frontal and parietal CVA:  initially not a candidate for TPA due to onset of symptoms>6hrs   ASA/Statins/Apixaban   repeat CT noted  Neuro evaluation noted  Telemetry: Afib  Speech and Swallow eval, PT evaluation   Echocardiogram    EEG in am to r/o seizures  Ativan 1mg IV prn seizure activity  d/w neurology     #Afib:  apixaban  BBlockers     # Chronic systolic left Chf:  Lasix 20mg IV x 1 as needed   Coreg   ACEinh when BP stable    #Hepatitis C/ Liver Dysfunction:    Stable.  Follow.      #DVT Proph:    Apixaban    #Advanced Directives:  Xavier (patient's brother) [phone].  He is patient's POA.  Patient is FULL CODE.

## 2021-03-21 NOTE — CONSULT NOTE ADULT - SUBJECTIVE AND OBJECTIVE BOX
Patient is a 80y old  Male who presents with a chief complaint of COVID (19 Mar 2021 17:17)      BRIEF HOSPITAL COURSE: 81 y/o male with pmhx of hepatitis C, CKD III, HFrEF (35%), poor historian at baseline admitted on 3/17 with SOB, positive for COVID-10 on 3/7. Throughout hospital course patients mental status worsened and he developed right sided weakness. MRI showed watershed infarcts and he was also found to have paroxysmal afib.     RRT called tonight for worsening mental status, patient is now completely obtunded. Upon arrival to room patient is satting 98% on 100% NRB and using accessory muscles to breathe. He is completely unresponsive to noxious stimuli. Transported to ICU and emergently intubated for airway protection.     PAST MEDICAL & SURGICAL HISTORY:  Liver dysfunction    CKD (chronic kidney disease)    CHF (congestive heart failure)    Hepatitis C      Allergies    contrast media (iodine-based) (Anaphylaxis)  heparin containing compounds (Anaphylaxis; Other (Severe))    Intolerances      FAMILY HISTORY:      Family history otherwise noncontributory.      Review of Systems:  unable to obtain due to AMS    Social History: from chart--lives at home       Medications:  remdesivir  IVPB   IV Intermittent   remdesivir  IVPB 100 milliGRAM(s) IV Intermittent every 24 hours    carvedilol 12.5 milliGRAM(s) Oral every 12 hours  enalaprilat Injectable 1.25 milliGRAM(s) IV Push every 6 hours PRN    ALBUTerol    90 MICROgram(s) HFA Inhaler 2 Puff(s) Inhalation every 4 hours PRN  benzonatate 100 milliGRAM(s) Oral three times a day PRN  budesonide 160 MICROgram(s)/formoterol 4.5 MICROgram(s) Inhaler 2 Puff(s) Inhalation two times a day  guaifenesin/dextromethorphan  Syrup 10 milliLiter(s) Oral every 4 hours PRN    acetaminophen   Tablet .. 650 milliGRAM(s) Oral every 4 hours PRN  etomidate Injectable 20 milliGRAM(s) IV Push once  LORazepam   Injectable 1 milliGRAM(s) IV Push every 4 hours PRN  ondansetron Injectable 4 milliGRAM(s) IV Push every 6 hours PRN  propofol Infusion 10 MICROgram(s)/kG/Min IV Continuous <Continuous>  propofol Injectable 30 milliGRAM(s) IV Push once      apixaban 5 milliGRAM(s) Oral every 12 hours  aspirin enteric coated 81 milliGRAM(s) Oral daily    aluminum hydroxide/magnesium hydroxide/simethicone Suspension 30 milliLiter(s) Oral every 4 hours PRN      atorvastatin 40 milliGRAM(s) Oral at bedtime  dexAMETHasone  Injectable 6 milliGRAM(s) IV Push daily        chlorhexidine 0.12% Liquid 15 milliLiter(s) Oral Mucosa every 12 hours        Mode: AC/ CMV (Assist Control/ Continuous Mandatory Ventilation)  RR (machine): 16  TV (machine): 400  FiO2: 100  PEEP: 5  ITime: 1  MAP: 10  PIP: 24      ICU Vital Signs Last 24 Hrs  T(C): 37.1 (21 Mar 2021 17:25), Max: 37.3 (21 Mar 2021 08:32)  T(F): 98.8 (21 Mar 2021 17:25), Max: 99.2 (21 Mar 2021 08:32)  HR: 98 (21 Mar 2021 15:40) (85 - 110)  BP: 149/73 (21 Mar 2021 17:25) (148/78 - 174/92)  BP(mean): --  ABP: --  ABP(mean): --  RR: 22 (21 Mar 2021 17:25) (19 - 24)  SpO2: 96% (21 Mar 2021 15:40) (94% - 98%)    Vital Signs Last 24 Hrs  T(C): 37.1 (21 Mar 2021 17:25), Max: 37.3 (21 Mar 2021 08:32)  T(F): 98.8 (21 Mar 2021 17:25), Max: 99.2 (21 Mar 2021 08:32)  HR: 98 (21 Mar 2021 15:40) (85 - 110)  BP: 149/73 (21 Mar 2021 17:25) (148/78 - 174/92)  BP(mean): --  RR: 22 (21 Mar 2021 17:25) (19 - 24)  SpO2: 96% (21 Mar 2021 15:40) (94% - 98%)        I&O's Detail    20 Mar 2021 07:01  -  21 Mar 2021 07:00  --------------------------------------------------------  IN:  Total IN: 0 mL    OUT:    Voided (mL): 750 mL  Total OUT: 750 mL    Total NET: -750 mL            LABS:                        12.0   21.29 )-----------( 688      ( 20 Mar 2021 07:08 )             35.9     03-21    x   |  x   |  x   ----------------------------<  x   x    |  x   |  0.87    Ca    9.5      20 Mar 2021 07:08    TPro  7.5  /  Alb  2.3<L>  /  TBili  0.8  /  DBili  0.4<H>  /  AST  47<H>  /  ALT  80<H>  /  AlkPhos  155<H>  03-21          CAPILLARY BLOOD GLUCOSE      POCT Blood Glucose.: 159 mg/dL (21 Mar 2021 20:09)    PT/INR - ( 21 Mar 2021 07:20 )   PT: 16.8 sec;   INR: 1.46 ratio             CULTURES:  Culture Results:   No growth to date. (03-17 @ 13:27)  Culture Results:   No growth to date. (03-17 @ 12:38)  Culture Results:   No growth (03-17 @ 04:15)      Physical Examination:    GENERAL: obtunded    EYES: Pupils equal, reactive to light.  Symmetric.    EARS, NOSE, THROAT: Normal; supple neck, no lymphadenopathy; trachea midline    PULM: Clear to auscultation bilaterally, no significant sputum production. + use of abdominal accessory respiratory muscles.    CVS: Regular rate and rhythm, no murmurs, rubs, or gallops    GI: Soft, nondistended, nontender, normoactive bowel sounds, no masses, no guarding    EXTREMITIES: No edema. DP and radial pulses 2+ bilaterally.    SKIN: Warm and well perfused, no rashes noted.    NEURO: obtunded. no response to noxious stimuli.     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 Mar 18 2021  3:25PM    IMPRESSION:    Multiple acute to subacute infarcts in the left frontal and parietal lobes and to a much lesser degree in the frontal lobe, as seen on prior MRI brain, are reidentified. No associated hemorrhage or mass effect.  Chronic changes as above.              BILL GALARZA MD; Attending Radiologist  This document has been electronically signed. Mar 21 2021  1:14PM      CRITICAL CARE TIME SPENT: 45 minutes of critical care time spent providing medical care for patient's acute illness/conditions that impairs at least one vital organ system and/or poses a high risk of imminent or life threatening deterioration in the patient's condition. It includes time spent evaluating and treating the patient's acute illness as well as time spent reviewing labs, radiology, discussing goals of care with patient and/or patient's family, and discussing the case with a multidisciplinary team in an effort to prevent further life threatening deterioration or end organ damage. This time is independent of any procedures performed.

## 2021-03-21 NOTE — PROVIDER CONTACT NOTE (CHANGE IN STATUS NOTIFICATION) - ASSESSMENT
RR >40, with labored breathing and accessory muscle use.   93% on VM 50%. Pt switched to NRB SaO2 96%.

## 2021-03-22 NOTE — PROGRESS NOTE ADULT - ASSESSMENT
`Diagnosis: Right sided weakness 2/2 CVA.     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 Shows worsening ischemic CVAs and new ischemia area in caudate nucleus.   -Order b/l `Doppler venous US in setting of new infarcts from 3/18 imaging to repeat on 3/21. Echo could not rule out PFO.  -Order US of b/l carotids. Hx of stenosis. MRA imaging could not evaluate due to motion artifact   -COnt. eliquis    Respiratory:   Type 1 respiratory Failure 2/2 viral covid PNA: Currently on  AC/ CMV RR: 24 TV: 375 FiO2: 90 PEEP: 8  Decreased Fio2 to 50% and increased Peep to 10  Cont. to wean as tolerated  ABG WNL  COvid Positive 3/17. Vral Covid inflammatory markers elevated. Currently on Day 5/5 of remdesivir and decadron 6mg  Repeat chest xray tommorow AM    Cardiovascular:   CHF w/ REF- EF 30-35%. Currently compensated(Dry and Warm)  -Cont carvedilol  -Add lisinopril 5mg when able to tolerate PO. Cont. enalipril IV  -New Onset Paroxy Afib-Chadvasc 5. On Eliquis. Rate is controlled. PRN Cardizem or Lopressor if patient reverts to RVR.      GI:   Hx of Hepatitis B. Ammonium 27 on admission  LFT's trending up 2/2 to remdesivir      :   Monitor I/O's     Hematologic:   Hbg Stable      Dispo: wean off vent as tolerated. Reassess neurological status   `Diagnosis: Right sided weakness 2/2 CVA.     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 Shows worsening ischemic CVAs and new ischemia area in caudate nucleus.   -Order b/l `Doppler venous US in setting of new infarcts from 3/18 imaging to repeat on 3/21. Echo could not rule out PFO.  -Order US of b/l carotids. Hx of stenosis. MRA imaging could not evaluate due to motion artifact   -COnt. eliquis  -Order EEG to rule out subclinical siezures      Respiratory:   Type 1 respiratory Failure 2/2 viral covid PNA: Currently on  AC/ CMV RR: 24 TV: 375 FiO2: 90 PEEP: 8  Decreased Fio2 to 50% and increased Peep to 10  Cont. to wean as tolerated  ABG WNL  COvid Positive 3/17. Vral Covid inflammatory markers elevated. Currently on Day 5/5 of remdesivir and decadron 6mg  Repeat chest xray tommorow AM    Cardiovascular:   CHF w/ REF- EF 30-35%. Currently compensated(Dry and Warm)  -Cont carvedilol  -Add lisinopril 5mg when able to tolerate PO. Cont. enalipril IV  -New Onset Paroxy Afib-Chadvasc 5. On Eliquis. Rate is controlled. PRN Cardizem or Lopressor if patient reverts to RVR.      GI:   Hx of Hepatitis B. Ammonium 27 on admission  LFT's trending up 2/2 to remdesivir      :   Monitor I/O's     Hematologic:   Hbg Stable      Dispo: wean off vent as tolerated. Reassess neurological status

## 2021-03-22 NOTE — PROGRESS NOTE ADULT - SUBJECTIVE AND OBJECTIVE BOX
Date of service: 03-22-21 @ 13:10    Events noted  Intubated on ventilatory support  Has low grade fever    ROS: unobtainable    MEDICATIONS  (STANDING):  apixaban 5 milliGRAM(s) Oral every 12 hours  aspirin enteric coated 81 milliGRAM(s) Oral daily  atorvastatin 40 milliGRAM(s) Oral at bedtime  budesonide 160 MICROgram(s)/formoterol 4.5 MICROgram(s) Inhaler 2 Puff(s) Inhalation two times a day  carvedilol 12.5 milliGRAM(s) Oral every 12 hours  chlorhexidine 0.12% Liquid 15 milliLiter(s) Oral Mucosa every 12 hours  dexAMETHasone  Injectable 6 milliGRAM(s) IV Push daily  pantoprazole  Injectable 40 milliGRAM(s) IV Push daily  propofol Infusion 10 MICROgram(s)/kG/Min (3.56 mL/Hr) IV Continuous <Continuous>    Vital Signs Last 24 Hrs  T(C): 37.3 (22 Mar 2021 10:00), Max: 38.2 (22 Mar 2021 06:00)  T(F): 99.2 (22 Mar 2021 10:00), Max: 100.7 (22 Mar 2021 06:00)  HR: 84 (22 Mar 2021 12:00) (77 - 98)  BP: 119/63 (22 Mar 2021 12:00) (103/61 - 154/76)  BP(mean): 76 (22 Mar 2021 12:00) (67 - 95)  RR: 34 (22 Mar 2021 12:00) (21 - 47)  SpO2: 97% (22 Mar 2021 12:00) (83% - 100%)  Mode: AC/ CMV (Assist Control/ Continuous Mandatory Ventilation), RR (machine): 24, TV (machine): 375, FiO2: 90, PEEP: 8, ITime: 1, MAP: 12, PIP: 28   Physical exam:    Constitutional:  No acute distress  HEENT: NC/AT, EOMI, PERRLA, conjunctivae clear  Neck: supple; thyroid not palpable  Back: no tenderness  Respiratory: respiratory effort normal; crackles at bases  Cardiovascular: S1S2 regular, no murmurs  Abdomen: soft, not tender, not distended, positive BS  Genitourinary: no suprapubic tenderness  Lymphatic: no LN palpable  Musculoskeletal: no muscle tenderness, no joint swelling or tenderness  Extremities: no pedal edema  Neurological/ Psychiatric: confused; moving all extremities  Skin: no rashes; no palpable lesions    Labs: reviewed                        12.1   24.66 )-----------( 557      ( 22 Mar 2021 07:24 )             37.7     03-22    147<H>  |  114<H>  |  53<H>  ----------------------------<  115<H>  4.1   |  26  |  1.09    Ca    8.7      22 Mar 2021 07:24  Phos  4.2     03-22  Mg     2.7     03-22    TPro  6.9  /  Alb  2.1<L>  /  TBili  0.7  /  DBili  x   /  AST  67<H>  /  ALT  68  /  AlkPhos  132<H>  03-22    D-Dimer Assay, Quantitative: 28060 ng/mL DDU (03-22-21 @ 07:24)  C-Reactive Protein, Serum: 82 mg/L (03-22-21 @ 07:24)  C-Reactive Protein, Serum: 78 mg/L (03-22-21 @ 05:00)  D-Dimer Assay, Quantitative: 3122 ng/mL DDU (03-20-21 @ 07:08)  Ferritin, Serum: 624 ng/mL (03-20-21 @ 07:08)  C-Reactive Protein, Serum: 35 mg/L (03-20-21 @ 07:08)  D-Dimer Assay, Quantitative: 2000 ng/mL DDU (03-18-21 @ 17:14)  C-Reactive Protein, Serum: 273 mg/L (03-17-21 @ 12:38)  D-Dimer Assay, Quantitative: 2434 ng/mL DDU (03-17-21 @ 12:38)  Ferritin, Serum: 475 ng/mL (03-17-21 @ 12:38)                        11.5   12.36 )-----------( 486      ( 18 Mar 2021 09:04 )             34.2     03-19    x   |  x   |  x   ----------------------------<  x   x    |  x   |  0.91    Ca    9.4      18 Mar 2021 09:04    TPro  7.3  /  Alb  2.1<L>  /  TBili  0.4  /  DBili  0.2  /  AST  54<H>  /  ALT  59  /  AlkPhos  121<H>  03-19      D-Dimer Assay, Quantitative: 2000 ng/mL DDU (03-18-21 @ 17:14)  C-Reactive Protein, Serum: 273 mg/L (03-17-21 @ 12:38)  D-Dimer Assay, Quantitative: 2434 ng/mL DDU (03-17-21 @ 12:38)  Ferritin, Serum: 475 ng/mL (03-17-21 @ 12:38)      Culture - Blood (collected 17 Mar 2021 13:27)  Source: .Blood None  Preliminary Report (18 Mar 2021 19:02):    No growth to date.    Culture - Blood (collected 17 Mar 2021 12:38)  Source: .Blood Blood-Peripheral  Preliminary Report (18 Mar 2021 17:02):    No growth to date.    Culture - Urine (collected 17 Mar 2021 04:15)  Source: .Urine Clean Catch (Midstream)  Final Report (19 Mar 2021 09:57):    No growth    COVID-19 PCR: Detected (03-17-21 @ 14:45)    Radiology: all available radiological tests reviewed    < from: CT Angio Chest PE Protocol w/ IV Cont (03.17.21 @ 14:20) >  Bilateral infiltrates consistent with COVID 19 pneumonia.  No evidence of pulmonary embolus.  < end of copied text >      Advanced directives addressed: full resuscitation

## 2021-03-22 NOTE — PROGRESS NOTE ADULT - SUBJECTIVE AND OBJECTIVE BOX
Neurology Consult requested by:   Patient is a 80y old  Male who presents with a chief complaint of CVA (22 Mar 2021 00:11)     HPI:  79 y/o male with a PMHx of hepatitis C, liver dysfunction, chronic CHF, CKD stage III, COVID + on 3/7/21.  As per PMD, CTA negative for PE but positive for bilateral ground glass opacities c/w COVID-19.  Patient received Decadron 6 mg IV x1 in the ER and admitted.  developed right weakness, CT/MR showed bilat water shed infarcts. Has become progressively less responsive, no intubated in ICU.    Social Hx:  Nonsmoker, no drug or alcohol use  Medications and Allergies ReviewedMEDICATIONS  (STANDING):  apixaban 5 milliGRAM(s) Oral every 12 hours  aspirin enteric coated 81 milliGRAM(s) Oral daily  atorvastatin 40 milliGRAM(s) Oral at bedtime  budesonide 160 MICROgram(s)/formoterol 4.5 MICROgram(s) Inhaler 2 Puff(s) Inhalation two times a day  carvedilol 12.5 milliGRAM(s) Oral every 12 hours  chlorhexidine 0.12% Liquid 15 milliLiter(s) Oral Mucosa every 12 hours  dexAMETHasone  Injectable 6 milliGRAM(s) IV Push daily  pantoprazole  Injectable 40 milliGRAM(s) IV Push daily  propofol Infusion 10 MICROgram(s)/kG/Min (3.56 mL/Hr) IV Continuous <Continuous>  remdesivir  IVPB   IV Intermittent   remdesivir  IVPB 100 milliGRAM(s) IV Intermittent every 24 hours     ROS: Pertinent positives in HPI, all other ROS were reviewed and are negative.      Examination:   Vital Signs Last 24 Hrs  T(C): 38.2 (22 Mar 2021 06:00), Max: 38.2 (22 Mar 2021 06:00)  T(F): 100.7 (22 Mar 2021 06:00), Max: 100.7 (22 Mar 2021 06:00)  HR: 80 (22 Mar 2021 10:40) (77 - 98)  BP: 116/64 (22 Mar 2021 10:30) (103/61 - 154/76)  BP(mean): 76 (22 Mar 2021 10:30) (67 - 95)  RR: 35 (22 Mar 2021 10:40) (21 - 47)  SpO2: 91% (22 Mar 2021 10:40) (83% - 100%)  General: intubated, non responsive, off sedation  NECK: supple, no masses  Vascular : no carotid bruits,   Lungs: bilat creps, ronchi  Extremities: nontender, no edema  Musculoskeletal: no adventitious movements, no joint stiffness  Skin: no rash    Neurological Examination:  MS: unresponsive, off sedation   CN: pupils 3-4 mm, min reactive, no Dolls eyes, + corneal face grossly symm, no gag  Motor: cachectic, reduced tone, no spont movements   Sens: no response to painful stim.    Reflexes: 0/4 all over, mute toes b/l  Coord:  cannot test   Gait: Cannot test    Labs: Reviewed  D-Dimer Assay, Quantitative: 75881 ng/mL DDU (03.22.21 @ 07:24)   D-Dimer Assay, Quantitative: 3122 ng/mL DDU (03.20.21 @ 07:08)   D-Dimer Assay, Quantitative: 2000 ng/mL DDU (03.18.21 @ 17:14)   D-Dimer Assay, Quantitative: 2434 ng/mL DDU (03.17.21 @ 12:38)     Comprehensive Metabolic Panel (03.22.21 @ 07:24)   Sodium, Serum: 147 mmol/L   Potassium, Serum: 4.1 mmol/L   Chloride, Serum: 114 mmol/L   Carbon Dioxide, Serum: 26 mmol/L   Anion Gap, Serum: 7 mmol/L   Blood Urea Nitrogen, Serum: 53 mg/dL   Creatinine, Serum: 1.09 mg/dL   Glucose, Serum: 115 mg/dL   Calcium, Total Serum: 8.7 mg/dL   Protein Total, Serum: 6.9 gm/dL   Albumin, Serum: 2.1 g/dL   Bilirubin Total, Serum: 0.7 mg/dL   Alkaline Phosphatase, Serum: 132 U/L   Aspartate Aminotransferase (AST/SGOT): 67 U/L   Alanine Aminotransferase (ALT/SGPT): 68 U/L   eGFR if Non : 64    C-Reactive Protein, Serum: 78:  C-Reactive Protein, Serum: 35:   C-Reactive Protein, Serum: 273              Imaging:  < from: CT Head No Cont (03.21.21 @ 22:00) >  FINDINGS:    Evolutionary changes are seen within the multifocal infarcts within both frontal and left parietal lobes including increasing low attenuation and mass effect from edema. There is no significant hemorrhagic conversion. Vague high attenuation the left frontal lobe infarct on series 2 images 21 and 22 appears to be related to beam hardening artifact on the reformatted images. New low-attenuation involves the head of the right caudate nucleus with stable appearance in the head of the left caudate nucleus. The posterior fossa is unremarkable. Stable mild chronic microvascular changes.    The ventricles, sulci and cisternal spaces are stable in size and configuration without evidence of hydrocephalus.  There is no midline shift or abnormal extra-axial fluid collection.    The calvarium is intact.  There are no osteoblastic or lytic calvarial or skull base lesions.  The paranasal sinuses and mastoid air cells are clear.    IMPRESSION:  Evolving infarcts in both frontal and left parietal lobes without significant hemorrhagic conversion. New infarct in the head of the right caudate nucleus. No midline shift or hydrocephalus.    < end of copied text >  < from: MR Head No Cont (03.18.21 @ 15:21) >  INTERPRETATION:  MRI brain without contrast    Comparison CT performed the prior day    History left-sided weakness and confusion    There is moderate cortical diffusion restriction involving the anterior and posterior watershed zones on the left with mild right anterior involvement and a tiny focus in the left caudate head. There is associated matching cytotoxic edema without mass effect or hemorrhage. There is underlying generalized volume loss with relative preservation of white matter signal. The orbital and sellar contents and cerebellar tonsils are within normal limits. Note is made of opacification of the sphenoid sinus.    IMPRESSION:  Acute predominantly left-sided watershed distribution infarcts    < end of copied text >  < from: MR Angio Head No Cont (03.18.21 @ 18:23) >  INTERPRETATION:  Exam Date: 3/18/2021 6:23 PM    MR angiography of the intracranial circulation.  (Non gadolinium technique.)    CLINICAL INFORMATION:  watershed infarcts. Patient with covid    TECHNIQUE:  MR angiography of intracranial circulation was performed using three-dimensional time of flight technique. Post processing angiographic reconstruction of images was performed. This data set was reconstructedas maximum intensity pixel images and displayed in multiple rotations.    FINDINGS:    This study is nondiagnostic secondary to motion artifact. Repeat study can be performed when patient can tolerate.    IMPRESSION:  This study is nondiagnostic secondary to motion artifact. Repeat study can be performed when patient can tolerate.    < end of copied text >

## 2021-03-22 NOTE — PROGRESS NOTE ADULT - ASSESSMENT
IMP:    81 y/o male with COVID, hepatitis C, liver dysfunction, chronic HFrEF (35%) and CKD stage III found to have worsening prior CVA and new ischemic CVA resulting in unresponsiveness and acute type 1 resp failure   Viral PNA sepsis secondary to COVID--Acute type 1 resp failure and ARDS  AFib on DOAC    Critically ill.  High risk for acute decompensation and deterioration including death   Patient requires critical care for support of one or more vital organ systems with a high probability of imminent or life threatening deterioration in his/her condition     Plan:    Full vent support--LPV strategy to maintain plateau pressures < 30--High PEEP/low TV--Permissive hypercapnea and acidemia.  Decrease FIO2   Not candidate for proning  Cont with Rem/Dexa (6)  DOAC   TF   HOB > 30  Neuro checks  DVT prophy--AC    ICU care--d/w ICU staff on multi disciplinary rounds and brother Xavier  updated him on pt's condition.  All concerns addressed including but not limited to diagnosis, treatment plan and overall prognosis         IMP:    81 y/o male with COVID, hepatitis C, liver dysfunction, chronic HFrEF (35%) and CKD stage III found to have evolving B/L frontal and L parietal CVA and new ischemic R CN CVA resulting in unresponsiveness and acute type 1 resp failure   Viral PNA sepsis secondary to COVID--Acute type 1 resp failure and ARDS  AFib on DOAC    Critically ill.  High risk for acute decompensation and deterioration including death   Patient requires critical care for support of one or more vital organ systems with a high probability of imminent or life threatening deterioration in his/her condition     Plan:    Full vent support--LPV strategy to maintain plateau pressures < 30--High PEEP/low TV--Permissive hypercapnea and acidemia.  Decrease FIO2   Not candidate for proning  Cont with Rem/Dexa (6)  DOAC   TF   HOB > 30  Neuro checks  DVT prophy--AC    ICU care--d/w ICU staff on multi disciplinary rounds and brother Xavier  updated him on pt's condition.  All concerns addressed including but not limited to diagnosis, treatment plan and overall prognosis

## 2021-03-22 NOTE — PROGRESS NOTE ADULT - ASSESSMENT
81 y/o male with h/o chronic hepatitis C, liver dysfunction, chronic CHF, CKD stage III, recently diagnosed with COVID-19 viral syndrome was admitted on 3/17 for worsening SOB.  Pt tested COVID + on 3/7/21. The pt is a poor historian at baseline. Pt's O2 at home in 80s prompting him to come to ED. In the ER, sats 89% on RA.  Patient started on 5L NC and sats improved to upper 90's.     1. COVID-19 viral syndrome. Acute respiratory failure. Multifocal pneumonia.   -respiratory worse  -encephlopathy  -leukocytosis worse   -on remdesivir protocol # 5  -tolerating abx well so far; no side effects noted  -O2 therapy  -AC  -steroids  -respiratory care  -droplet isolation  -extend antiviral therapy  -monitor temps  -f/u CBC  -supportive care  2. Other issues:   -care per medicine

## 2021-03-22 NOTE — PROGRESS NOTE ADULT - SUBJECTIVE AND OBJECTIVE BOX
H&P: 81 y/o male with a PMHx of hepatitis C, liver dysfunction, chronic CHF, CKD stage III who presents to the ED c/o SOB.  Pt tested COVID + on 3/7/21.  As per PMD, patient's girlfriend also has COVID.  PMD reports pt is a poor historian at baseline.  Pt's O2 at home in 80s prompting him to come to ED.  In the ER, sats 89% on RA.  Patient started on 5L NC and sats improved to upper 90's.  WBC elevated @ 13.  DDimer high 2434.  CTA negative for PE but positive for bilateral ground glass opacities c/w COVID-19.  Patient received Decadron 6 mg IV x1 in the ER and admitted.      3/22 Patient is a 80y old  Male who presents with a chief complaint of dyspnea ans right sided weakness on admission but did not recieve TPA due to being outside the therapeutic window. 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 Patient continue to have worsening mental status throughout this hospitalization and was transferred to ICU 3/21 when patient was  found to be obtunded and was intubated. Patient seen and evaluated at bedside. Patient is sedated on propofol.         Notable Hx  He has an allergy to contrast as per his brother. He had respiratory arrest and kidney failure in the past after receiving contrast.   He also has a history of heparin induced thrombocytopenia.  He had angioplasty in his left leg and a stent in his right leg.      PAST MEDICAL & SURGICAL HISTORY:  Liver dysfunction    CKD (chronic kidney disease)    CHF (congestive heart failure)    Hepatitis C        Review of Systems:  Could not obtain    Medications:  remdesivir  IVPB   IV Intermittent   remdesivir  IVPB 100 milliGRAM(s) IV Intermittent every 24 hours    carvedilol 12.5 milliGRAM(s) Oral every 12 hours  enalaprilat Injectable 1.25 milliGRAM(s) IV Push every 6 hours PRN    ALBUTerol    90 MICROgram(s) HFA Inhaler 2 Puff(s) Inhalation every 4 hours PRN  benzonatate 100 milliGRAM(s) Oral three times a day PRN  budesonide 160 MICROgram(s)/formoterol 4.5 MICROgram(s) Inhaler 2 Puff(s) Inhalation two times a day  guaifenesin/dextromethorphan  Syrup 10 milliLiter(s) Oral every 4 hours PRN    acetaminophen   Tablet .. 650 milliGRAM(s) Oral every 4 hours PRN  ondansetron Injectable 4 milliGRAM(s) IV Push every 6 hours PRN  propofol Infusion 10 MICROgram(s)/kG/Min IV Continuous <Continuous>      apixaban 5 milliGRAM(s) Oral every 12 hours  aspirin enteric coated 81 milliGRAM(s) Oral daily    aluminum hydroxide/magnesium hydroxide/simethicone Suspension 30 milliLiter(s) Oral every 4 hours PRN  pantoprazole  Injectable 40 milliGRAM(s) IV Push daily      atorvastatin 40 milliGRAM(s) Oral at bedtime  dexAMETHasone  Injectable 6 milliGRAM(s) IV Push daily        chlorhexidine 0.12% Liquid 15 milliLiter(s) Oral Mucosa every 12 hours        Mode: AC/ CMV (Assist Control/ Continuous Mandatory Ventilation)  RR (machine): 24  TV (machine): 375  FiO2: 90  PEEP: 8  ITime: 1  MAP: 12  PIP: 24      ICU Vital Signs Last 24 Hrs  T(C): 38.2 (22 Mar 2021 06:00), Max: 38.2 (22 Mar 2021 06:00)  T(F): 100.7 (22 Mar 2021 06:00), Max: 100.7 (22 Mar 2021 06:00)  HR: 80 (22 Mar 2021 09:00) (77 - 98)  BP: 106/62 (22 Mar 2021 09:00) (103/61 - 154/76)  BP(mean): 73 (22 Mar 2021 09:00) (67 - 95)  RR: 34 (22 Mar 2021 09:00) (21 - 47)  SpO2: 99% (22 Mar 2021 09:00) (83% - 100%)      ABG - ( 22 Mar 2021 05:48 )  pH, Arterial: 7.43  pH, Blood: x     /  pCO2: 40    /  pO2: 70    / HCO3: 26    / Base Excess: 2.2   /  SaO2: 92                  I&O's Detail    21 Mar 2021 07:01  -  22 Mar 2021 07:00  --------------------------------------------------------  IN:    Enteral Tube Flush: 125 mL    IV PiggyBack: 100 mL    Jevity 1.5: 19 mL    Propofol: 111 mL  Total IN: 355 mL    OUT:    Voided (mL): 1000 mL  Total OUT: 1000 mL    Total NET: -645 mL            LABS:                        12.1   24.66 )-----------( 557      ( 22 Mar 2021 07:24 )             37.7     03-22    147<H>  |  114<H>  |  53<H>  ----------------------------<  115<H>  4.1   |  26  |  1.09    Ca    8.7      22 Mar 2021 07:24  Phos  4.2     03-22  Mg     2.7     03-22    TPro  6.9  /  Alb  2.1<L>  /  TBili  0.7  /  DBili  x   /  AST  67<H>  /  ALT  68  /  AlkPhos  132<H>  03-22          CAPILLARY BLOOD GLUCOSE      POCT Blood Glucose.: 159 mg/dL (21 Mar 2021 20:09)    PT/INR - ( 22 Mar 2021 07:24 )   PT: 20.1 sec;   INR: 1.78 ratio             CULTURES:  Culture Results:   No growth to date. (03-17 @ 13:27)  Culture Results:   No growth to date. (03-17 @ 12:38)  Culture Results:   No growth (03-17 @ 04:15)      Physical Examination:    General: Obtunded. Cachetic     HEENT: Pupils equal, reactive to light.  Symmetric.    PULM: Clear to auscultation bilaterally, no significant sputum production    CVS: Regular rate and rhythm, no murmurs, rubs, or gallops    ABD: Soft, nondistended, nontender, normoactive bowel sounds, liver edge palpated 2cm below costal ridge    EXT: No edema, nontender    SKIN: Warm and well perfused. multiple bruising and increased skin turgor seen b/l foreaems        CRITICAL CARE TIME SPENT: ***

## 2021-03-22 NOTE — CONSULT NOTE ADULT - SUBJECTIVE AND OBJECTIVE BOX
REASON FOR CONSULT: Altered Mentation, CVA    Chief Complaint    HPI:81 y/o male with pmhx of hepatitis C, CKD III, HFrEF (35%), poor historian at baseline admitted on 3/17 with SOB, positive for COVID-10 on 3/7. Throughout hospital course patients mental status worsened and he developed right sided weakness. MRI showed watershed infarcts and he was also found to have paroxysmal afib.     RRT called tonight for worsening mental status, patient is now completely obtunded. Upon arrival to room patient is satting 98% on 100% NRB and using accessory muscles to breathe. He is completely unresponsive to noxious stimuli. Transported to ICU and emergently intubated for airway protection.       PAST MEDICAL & SURGICAL HISTORY:  Liver dysfunction  CKD (chronic kidney disease)  CHF (congestive heart failure)  Hepatitis C      Allergies  contrast media (iodine-based) (Anaphylaxis)  heparin containing compounds (Anaphylaxis; Other (Severe))      Medications:  remdesivir  IVPB   IV Intermittent   remdesivir  IVPB 100 milliGRAM(s) IV Intermittent every 24 hours  carvedilol 12.5 milliGRAM(s) Oral every 12 hours  enalaprilat Injectable 1.25 milliGRAM(s) IV Push every 6 hours PRN  ALBUTerol    90 MICROgram(s) HFA Inhaler 2 Puff(s) Inhalation every 4 hours PRN  benzonatate 100 milliGRAM(s) Oral three times a day PRN  budesonide 160 MICROgram(s)/formoterol 4.5 MICROgram(s) Inhaler 2 Puff(s) Inhalation two times a day  guaifenesin/dextromethorphan  Syrup 10 milliLiter(s) Oral every 4 hours PRN  acetaminophen   Tablet .. 650 milliGRAM(s) Oral every 4 hours PRN  ondansetron Injectable 4 milliGRAM(s) IV Push every 6 hours PRN  propofol Infusion 10 MICROgram(s)/kG/Min IV Continuous <Continuous>  apixaban 5 milliGRAM(s) Oral every 12 hours  aspirin enteric coated 81 milliGRAM(s) Oral daily  aluminum hydroxide/magnesium hydroxide/simethicone Suspension 30 milliLiter(s) Oral every 4 hours PRN  pantoprazole  Injectable 40 milliGRAM(s) IV Push daily  atorvastatin 40 milliGRAM(s) Oral at bedtime  dexAMETHasone  Injectable 6 milliGRAM(s) IV Push daily  chlorhexidine 0.12% Liquid 15 milliLiter(s) Oral Mucosa every 12 hours        Mode: AC/ CMV (Assist Control/ Continuous Mandatory Ventilation)  RR (machine): 16  TV (machine): 375  FiO2: 50  PEEP: 5  ITime: 1  MAP: 6  PIP: 16      Vital Signs Last 24 Hrs  T(C): 37.9 (21 Mar 2021 22:00), Max: 37.9 (21 Mar 2021 22:00)  T(F): 100.2 (21 Mar 2021 22:00), Max: 100.2 (21 Mar 2021 22:00)  HR: 79 (21 Mar 2021 22:00) (79 - 110)  BP: 141/71 (21 Mar 2021 22:00) (132/74 - 161/85)  BP(mean): 88 (21 Mar 2021 22:00) (88 - 93)  RR: 31 (21 Mar 2021 22:00) (19 - 47)  SpO2: 95% (21 Mar 2021 22:00) (93% - 100%)    ABG - ( 21 Mar 2021 23:06 )  pH, Arterial: 7.50  pH, Blood: x     /  pCO2: 33    /  pO2: 84    / HCO3: 26    / Base Excess: 3.1   /  SaO2: 94          20 Mar 2021 07:01  -  21 Mar 2021 07:00  --------------------------------------------------------  IN:  Total IN: 0 mL    OUT:    Voided (mL): 750 mL  Total OUT: 750 mL    Total NET: -750 mL      21 Mar 2021 07:01  -  22 Mar 2021 00:13  --------------------------------------------------------  IN:  Total IN: 0 mL    OUT:    Voided (mL): 800 mL  Total OUT: 800 mL    Total NET: -800 mL      LABS:                        12.0   21.29 )-----------( 688      ( 20 Mar 2021 07:08 )             35.9     03-21    x   |  x   |  x   ----------------------------<  x   x    |  x   |  0.87    Ca    9.5      20 Mar 2021 07:08    TPro  7.5  /  Alb  2.3<L>  /  TBili  0.8  /  DBili  0.4<H>  /  AST  47<H>  /  ALT  80<H>  /  AlkPhos  155<H>  03-21        POCT Blood Glucose.: 159 mg/dL (21 Mar 2021 20:09)    PT/INR - ( 21 Mar 2021 07:20 )   PT: 16.8 sec;   INR: 1.46 ratio             CULTURES:  Culture Results:   No growth to date. (03-17 @ 13:27)  Culture Results:   No growth to date. (03-17 @ 12:38)  Culture Results:   No growth (03-17 @ 04:15)      Physical Examination:  Physical exam as per bedside MD (direct physical examination could not be performed because the visit was provided via Telemedicine):       RADIOLOGY: < from: CT Head No Cont (03.21.21 @ 22:00) >  IMPRESSION:  Evolving infarcts in both frontal and left parietal lobes without significant hemorrhagic conversion. New infarct in the head of the right caudate nucleus. No midline shift or hydrocephalus.        < end of copied text >      IMP-  CVA extension- multifocal  Altered mentation  Respiratory failure  COVID    Plan-  Admit to ICU  Neurology is on the case  Vent support  COVID protocol  Palliative consult    CRITICAL CARE TIME SPENT: 45    This visit was provided via telemedicine. Patient was located at     [facility].  Provider was located at [facility] [place] for the visit.

## 2021-03-22 NOTE — PROGRESS NOTE ADULT - ASSESSMENT
79 y/o man with a PMHx of hepatitis C, liver dysfunction, chronic CHF, CKD stage III admitted with COVID + PNA, course complicated with CVAs, becoming less responsive, transferred to ICU, intubated. MRI brain does showed acute, predominantly left sided watershed infarcts. Repeat head CT evolving CVA, new caudate head   CVA. MS likely due to CVAs, underlying worsening respiratory function. prognosis poor.  Suggest:   EEG  continue AC, low dose ASA, statin  supportive care as per ICU staff.

## 2021-03-22 NOTE — PROGRESS NOTE ADULT - SUBJECTIVE AND OBJECTIVE BOX
Hospital D # 5  ICU # 1  Vent # 1    CC:  Respiratory Failure     HPI:    79 y/o male with hepatitis C, liver dysfunction, chronic HFrEF (35%) and CKD stage III who presents to the ED c/o SOB.  Pt tested COVID + on 3/7/21.  As per PMD, patient's girlfriend also has COVID.  PMD reports pt is a poor historian at baseline.  Pt's O2 at home in 80s prompting him to come to ED.  In the ER, sats 89% on RA.  Patient started on 5L NC and sats improved to upper 90's.  WBC elevated @ 13.  DDimer high 2434.  CTA negative for PE but positive for bilateral ground glass opacities c/w COVID-19  Hospital course complicated by worsening mentation.  Brain MR revealed watershed infarcts and PAFib started on AC    3/22:  Case d/w ALVARO Brar.  Pt seen and examined in ICU.  RRT O/N for obtundation.  Intubated.  HCT reveal worsening of prior CVA and new CVA. Vented on 90/8.  Not responsive off sedation.  Poor [place]     PMH:  As above.     PSH:  As above.     FH: Non Contributory other than those listed in HPI    Social History:  Unobtainable due to clinical condition     MEDICATIONS  (STANDING):  apixaban 5 milliGRAM(s) Oral every 12 hours  aspirin enteric coated 81 milliGRAM(s) Oral daily  atorvastatin 40 milliGRAM(s) Oral at bedtime  budesonide 160 MICROgram(s)/formoterol 4.5 MICROgram(s) Inhaler 2 Puff(s) Inhalation two times a day  carvedilol 12.5 milliGRAM(s) Oral every 12 hours  chlorhexidine 0.12% Liquid 15 milliLiter(s) Oral Mucosa every 12 hours  dexAMETHasone  Injectable 6 milliGRAM(s) IV Push daily  pantoprazole  Injectable 40 milliGRAM(s) IV Push daily  propofol Infusion 10 MICROgram(s)/kG/Min (3.56 mL/Hr) IV Continuous <Continuous>  remdesivir  IVPB   IV Intermittent   remdesivir  IVPB 100 milliGRAM(s) IV Intermittent every 24 hours    MEDICATIONS  (PRN):  acetaminophen   Tablet .. 650 milliGRAM(s) Oral every 4 hours PRN Temp greater or equal to 38.5C (101.3F)  ALBUTerol    90 MICROgram(s) HFA Inhaler 2 Puff(s) Inhalation every 4 hours PRN Shortness of Breath and/or Wheezing  aluminum hydroxide/magnesium hydroxide/simethicone Suspension 30 milliLiter(s) Oral every 4 hours PRN Dyspepsia  benzonatate 100 milliGRAM(s) Oral three times a day PRN Cough  enalaprilat Injectable 1.25 milliGRAM(s) IV Push every 6 hours PRN as needed for sbp>160  guaifenesin/dextromethorphan  Syrup 10 milliLiter(s) Oral every 4 hours PRN Cough  ondansetron Injectable 4 milliGRAM(s) IV Push every 6 hours PRN Nausea and/or Vomiting      Allergies: NKDA    ROS:  SEE BELOW        ICU Vital Signs Last 24 Hrs  T(C): 38.2 (22 Mar 2021 06:00), Max: 38.2 (22 Mar 2021 06:00)  T(F): 100.7 (22 Mar 2021 06:00), Max: 100.7 (22 Mar 2021 06:00)  HR: 80 (22 Mar 2021 10:40) (77 - 98)  BP: 116/64 (22 Mar 2021 10:30) (103/61 - 154/76)  BP(mean): 76 (22 Mar 2021 10:30) (67 - 95)  ABP: --  ABP(mean): --  RR: 35 (22 Mar 2021 10:40) (21 - 47)  SpO2: 91% (22 Mar 2021 10:40) (83% - 100%)      Mode: AC/ CMV (Assist Control/ Continuous Mandatory Ventilation)  RR (machine): 24  TV (machine): 375  FiO2: 90  PEEP: 8  ITime: 1  MAP: 12  PIP: 28      I&O's Summary    21 Mar 2021 07:01  -  22 Mar 2021 07:00  --------------------------------------------------------  IN: 355 mL / OUT: 1000 mL / NET: -645 mL        Physical Exam:  SEE BELOW                          12.1   24.66 )-----------( 557      ( 22 Mar 2021 07:24 )             37.7       03-22    147<H>  |  114<H>  |  53<H>  ----------------------------<  115<H>  4.1   |  26  |  1.09    Ca    8.7      22 Mar 2021 07:24  Phos  4.2     03-22  Mg     2.7     03-22    TPro  6.9  /  Alb  2.1<L>  /  TBili  0.7  /  DBili  x   /  AST  67<H>  /  ALT  68  /  AlkPhos  132<H>  03-22            ABG - ( 22 Mar 2021 05:48 )  pH, Arterial: 7.43  pH, Blood: x     /  pCO2: 40    /  pO2: 70    / HCO3: 26    / Base Excess: 2.2   /  SaO2: 92                      DVT Prophylaxis:                                                            Contraindication:     Advanced Directives:    Discussed with:    Visit Information:  Time spent excluding procedure:  75 cc mins    ** Time is exclusive of billed procedures and/or teaching and/or routine family updates.           Hospital D # 5  ICU # 1  Vent # 1    CC:  Respiratory Failure     HPI:    79 y/o male with hepatitis C, liver dysfunction, chronic HFrEF (35%) and CKD stage III who presents to the ED c/o SOB.  Pt tested COVID + on 3/7/21.  As per PMD, patient's girlfriend also has COVID.  PMD reports pt is a poor historian at baseline.  Pt's O2 at home in 80s prompting him to come to ED.  In the ER, sats 89% on RA.  Patient started on 5L NC and sats improved to upper 90's.  WBC elevated @ 13.  DDimer high 2434.  CTA negative for PE but positive for bilateral ground glass opacities c/w COVID-19  Hospital course complicated by worsening mentation.  Brain MR revealed L watershed infarcts and PAFib started on AC    3/22:  Case d/w PA Zonia.  Pt seen and examined in ICU.  RRT O/N for obtundation.  Intubated.  HCT reveal Evolving prior B/L frontal and L CVA and new R CN CVA . Vented on 90/8.  Not responsive off sedation.  Poor [place]     PMH:  As above.     PSH:  As above.     FH: Non Contributory other than those listed in HPI    Social History:  Unobtainable due to clinical condition     MEDICATIONS  (STANDING):  apixaban 5 milliGRAM(s) Oral every 12 hours  aspirin enteric coated 81 milliGRAM(s) Oral daily  atorvastatin 40 milliGRAM(s) Oral at bedtime  budesonide 160 MICROgram(s)/formoterol 4.5 MICROgram(s) Inhaler 2 Puff(s) Inhalation two times a day  carvedilol 12.5 milliGRAM(s) Oral every 12 hours  chlorhexidine 0.12% Liquid 15 milliLiter(s) Oral Mucosa every 12 hours  dexAMETHasone  Injectable 6 milliGRAM(s) IV Push daily  pantoprazole  Injectable 40 milliGRAM(s) IV Push daily  propofol Infusion 10 MICROgram(s)/kG/Min (3.56 mL/Hr) IV Continuous <Continuous>  remdesivir  IVPB   IV Intermittent   remdesivir  IVPB 100 milliGRAM(s) IV Intermittent every 24 hours    MEDICATIONS  (PRN):  acetaminophen   Tablet .. 650 milliGRAM(s) Oral every 4 hours PRN Temp greater or equal to 38.5C (101.3F)  ALBUTerol    90 MICROgram(s) HFA Inhaler 2 Puff(s) Inhalation every 4 hours PRN Shortness of Breath and/or Wheezing  aluminum hydroxide/magnesium hydroxide/simethicone Suspension 30 milliLiter(s) Oral every 4 hours PRN Dyspepsia  benzonatate 100 milliGRAM(s) Oral three times a day PRN Cough  enalaprilat Injectable 1.25 milliGRAM(s) IV Push every 6 hours PRN as needed for sbp>160  guaifenesin/dextromethorphan  Syrup 10 milliLiter(s) Oral every 4 hours PRN Cough  ondansetron Injectable 4 milliGRAM(s) IV Push every 6 hours PRN Nausea and/or Vomiting      Allergies: NKDA    ROS:  SEE BELOW        ICU Vital Signs Last 24 Hrs  T(C): 38.2 (22 Mar 2021 06:00), Max: 38.2 (22 Mar 2021 06:00)  T(F): 100.7 (22 Mar 2021 06:00), Max: 100.7 (22 Mar 2021 06:00)  HR: 80 (22 Mar 2021 10:40) (77 - 98)  BP: 116/64 (22 Mar 2021 10:30) (103/61 - 154/76)  BP(mean): 76 (22 Mar 2021 10:30) (67 - 95)  ABP: --  ABP(mean): --  RR: 35 (22 Mar 2021 10:40) (21 - 47)  SpO2: 91% (22 Mar 2021 10:40) (83% - 100%)      Mode: AC/ CMV (Assist Control/ Continuous Mandatory Ventilation)  RR (machine): 24  TV (machine): 375  FiO2: 90  PEEP: 8  ITime: 1  MAP: 12  PIP: 28      I&O's Summary    21 Mar 2021 07:01  -  22 Mar 2021 07:00  --------------------------------------------------------  IN: 355 mL / OUT: 1000 mL / NET: -645 mL        Physical Exam:  SEE BELOW                          12.1   24.66 )-----------( 557      ( 22 Mar 2021 07:24 )             37.7       03-22    147<H>  |  114<H>  |  53<H>  ----------------------------<  115<H>  4.1   |  26  |  1.09    Ca    8.7      22 Mar 2021 07:24  Phos  4.2     03-22  Mg     2.7     03-22    TPro  6.9  /  Alb  2.1<L>  /  TBili  0.7  /  DBili  x   /  AST  67<H>  /  ALT  68  /  AlkPhos  132<H>  03-22            ABG - ( 22 Mar 2021 05:48 )  pH, Arterial: 7.43  pH, Blood: x     /  pCO2: 40    /  pO2: 70    / HCO3: 26    / Base Excess: 2.2   /  SaO2: 92                      DVT Prophylaxis:                                                            Contraindication:     Advanced Directives:    Discussed with:    Visit Information:  Time spent excluding procedure:  75 cc mins    ** Time is exclusive of billed procedures and/or teaching and/or routine family updates.

## 2021-03-22 NOTE — PROGRESS NOTE ADULT - SUBJECTIVE AND OBJECTIVE BOX
79 y/o male with pmhx of hepatitis C, CKD III, HFrEF (35%), poor historian at baseline admitted on 3/17 with SOB, positive for COVID-10 on 3/7. Throughout hospital course patients mental status worsened and he developed right sided weakness. MRI showed watershed infarcts and he was also found to have paroxysmal afib.     RRT called tonight for worsening mental status, patient is now completely obtunded. Upon arrival to room patient is satting 98% on 100% NRB and using accessory muscles to breathe. He is completely unresponsive to noxious stimuli. Transported to ICU and emergently intubated for airway protection.     post intubation patients fio2 was weaned to 50%. repeat head CT shows worsening ischemic CVAs and new ischemia area in caudate nucleus. patient is severely tachypneic on max dose propofol and his fio2 requirements have been increasing. he is now on 80% and peep of 5. CXR post intubation did not show any aspiration or worsening infiltrates or ptx.     POCUS RV is not dilated and there is no mcconell's sign. no effusions. + lung sliding throughout lung fields. he is also already on full a/c so given all of this PE is less likely. I increased his peep to 12 and fio2 to 100%, paralyzed with anne 50 mg x 1 for better vent synchrony and spo2 as improved now to 92%. adjusted RR for minute ventilation and increased it to 24, f/u with morning abg. peak/plateau 32/28 respectively after above changes.    Given the above, this deterioration in his oxygenation status is likely related to COVID-19. new data suggests in patients with acute decompensation taht there is a role for actemra. will give dose now, and defer to day team on whether to continue. would also consider increasing decadron to ARDS dosing.     overall, patients prognosis is extremely poor. his brother (HCP) already booked a flight from [place].    CCT 37 minutes.

## 2021-03-23 NOTE — CONSULT NOTE ADULT - ASSESSMENT
Pt is a 80 year old male with pmhx of hepatitis C, CKD III, HFrEF (35%) fond to have right femoral SFA occlusion in the setting of b/l watershed infarcts and evolving CVAs, COVID +    -Rec full anticoagulation with heparin alternative (heparin allergy) - currently on Eliquis 5 BID  -Not a surgical candidate at this time given neurologic prognosis  -May require right lower extremity amputation in future  -Attempted to reach pts POA - Xavier (patient's brother) [phone], no response at this time, will attempt to recontact in AM  -Vascular surgery will continue to follow  -Continue medical care per ICU    Discussed plan with Dr. Lopez, vascular surgery attending   [facility] PGY4 Pt is a 80 year old male with pmhx of hepatitis C, CKD III, HFrEF (35%) found to have right SFA occlusion in the setting of b/l cerebral watershed infarcts and evolving CVAs with associated COVID + PNA, intubated with poor neurologic prognosis     -Rec full anticoagulation with heparin alternative (heparin allergy) - currently on Eliquis 5 BID  -Not a surgical candidate at this time given neurologic prognosis with no benefit from operative embolectomy   -May require right lower extremity amputation in future  -Attempted to reach pts POA - Xavier (patient's brother) [phone], no response at this time, will attempt to recontact in AM  -Vascular surgery will continue to follow  -Continue medical care per ICU    Discussed plan with Dr. Lopez, vascular surgery attending   [facility] PGY4

## 2021-03-23 NOTE — DIETITIAN INITIAL EVALUATION ADULT. - ENTERAL
change TF Rx to Jevity 1.5 @ 30mL/hr with 4pkts [employer] TF.  Which will provide ~1060Kcal, 82g protein, 456mL free water, and total TF volume of 600mL.  With propofol, pt will meet 100% of estimated calorie needs.

## 2021-03-23 NOTE — DIETITIAN INITIAL EVALUATION ADULT. - PERTINENT LABORATORY DATA
03-23    146<H>  |  117<H>  |  55<H>  ----------------------------<  118<H>  4.3   |  25  |  1.01    Ca    8.9      23 Mar 2021 04:46  Phos  2.9     03-23  Mg     3.0     03-23    TPro  7.0  /  Alb  2.0<L>  /  TBili  0.6  /  DBili  0.1  /  AST  142<H>  /  ALT  94<H>  /  AlkPhos  142<H>  03-23

## 2021-03-23 NOTE — PROGRESS NOTE ADULT - SUBJECTIVE AND OBJECTIVE BOX
Date of service: 03-23-21 @ 12:26    Lying in bed in NAD  Intubated on ventilatory support  Noted with right foot dark , erythema discoloration  Has low grade fever  Worsening leukocytosis    ROS: unobtainable    MEDICATIONS  (STANDING):  apixaban 5 milliGRAM(s) Oral every 12 hours  aspirin  chewable 81 milliGRAM(s) Oral daily  atorvastatin 40 milliGRAM(s) Oral at bedtime  budesonide 160 MICROgram(s)/formoterol 4.5 MICROgram(s) Inhaler 2 Puff(s) Inhalation two times a day  carvedilol 12.5 milliGRAM(s) Oral every 12 hours  chlorhexidine 0.12% Liquid 15 milliLiter(s) Oral Mucosa every 12 hours  dexAMETHasone  Injectable 6 milliGRAM(s) IV Push daily  pantoprazole  Injectable 40 milliGRAM(s) IV Push daily  propofol Infusion 10 MICROgram(s)/kG/Min (3.56 mL/Hr) IV Continuous <Continuous>  remdesivir  IVPB 100 milliGRAM(s) IV Intermittent every 24 hours    Vital Signs Last 24 Hrs  T(C): 38.3 (23 Mar 2021 04:00), Max: 38.3 (23 Mar 2021 04:00)  T(F): 100.9 (23 Mar 2021 04:00), Max: 100.9 (23 Mar 2021 04:00)  HR: 96 (23 Mar 2021 11:00) (78 - 97)  BP: 145/66 (23 Mar 2021 11:00) (117/53 - 151/66)  BP(mean): 83 (23 Mar 2021 11:00) (68 - 89)  RR: 38 (23 Mar 2021 11:00) (31 - 41)  SpO2: 94% (23 Mar 2021 11:00) (87% - 96%)  Mode: AC/ CMV (Assist Control/ Continuous Mandatory Ventilation), RR (machine): 24, TV (machine): 375, FiO2: 55, PEEP: 10, ITime: 1, MAP: 10, PIP: 20   Physical exam:    Constitutional:  No acute distress  HEENT: NC/AT, EOMI, PERRLA, conjunctivae clear  Neck: supple; thyroid not palpable  Back: no tenderness  Respiratory: respiratory effort normal; crackles at bases  Cardiovascular: S1S2 regular, no murmurs  Abdomen: soft, not tender, not distended, positive BS  Genitourinary: no suprapubic tenderness  Lymphatic: no LN palpable  Musculoskeletal: no muscle tenderness, no joint swelling or tenderness  Extremities: no pedal edema  right foot dark , erythema discoloration; tender  Neurological/ Psychiatric: confused; moving all extremities  Skin: no rashes; no palpable lesions    Labs: reviewed                        12.3   32.52 )-----------( 594      ( 23 Mar 2021 10:20 )             37.6     03-23    146<H>  |  117<H>  |  55<H>  ----------------------------<  118<H>  4.3   |  25  |  1.01    Ca    8.9      23 Mar 2021 04:46  Phos  2.9     03-23  Mg     3.0     03-23    TPro  7.0  /  Alb  2.0<L>  /  TBili  0.6  /  DBili  0.1  /  AST  142<H>  /  ALT  94<H>  /  AlkPhos  142<H>  03-23      D-Dimer Assay, Quantitative: 54738 ng/mL DDU (03-22-21 @ 07:24)  C-Reactive Protein, Serum: 82 mg/L (03-22-21 @ 07:24)  C-Reactive Protein, Serum: 78 mg/L (03-22-21 @ 05:00)  D-Dimer Assay, Quantitative: 3122 ng/mL DDU (03-20-21 @ 07:08)  Ferritin, Serum: 624 ng/mL (03-20-21 @ 07:08)  C-Reactive Protein, Serum: 35 mg/L (03-20-21 @ 07:08)  D-Dimer Assay, Quantitative: 2000 ng/mL DDU (03-18-21 @ 17:14)  C-Reactive Protein, Serum: 273 mg/L (03-17-21 @ 12:38)  D-Dimer Assay, Quantitative: 2434 ng/mL DDU (03-17-21 @ 12:38)  Ferritin, Serum: 475 ng/mL (03-17-21 @ 12:38)                                12.1   24.66 )-----------( 557      ( 22 Mar 2021 07:24 )             37.7     03-22    147<H>  |  114<H>  |  53<H>  ----------------------------<  115<H>  4.1   |  26  |  1.09    Ca    8.7      22 Mar 2021 07:24  Phos  4.2     03-22  Mg     2.7     03-22    TPro  6.9  /  Alb  2.1<L>  /  TBili  0.7  /  DBili  x   /  AST  67<H>  /  ALT  68  /  AlkPhos  132<H>  03-22    D-Dimer Assay, Quantitative: 41076 ng/mL DDU (03-22-21 @ 07:24)  C-Reactive Protein, Serum: 82 mg/L (03-22-21 @ 07:24)  C-Reactive Protein, Serum: 78 mg/L (03-22-21 @ 05:00)  D-Dimer Assay, Quantitative: 3122 ng/mL DDU (03-20-21 @ 07:08)  Ferritin, Serum: 624 ng/mL (03-20-21 @ 07:08)  C-Reactive Protein, Serum: 35 mg/L (03-20-21 @ 07:08)  D-Dimer Assay, Quantitative: 2000 ng/mL DDU (03-18-21 @ 17:14)  C-Reactive Protein, Serum: 273 mg/L (03-17-21 @ 12:38)  D-Dimer Assay, Quantitative: 2434 ng/mL DDU (03-17-21 @ 12:38)  Ferritin, Serum: 475 ng/mL (03-17-21 @ 12:38)                        11.5   12.36 )-----------( 486      ( 18 Mar 2021 09:04 )             34.2     03-19    x   |  x   |  x   ----------------------------<  x   x    |  x   |  0.91    Ca    9.4      18 Mar 2021 09:04    TPro  7.3  /  Alb  2.1<L>  /  TBili  0.4  /  DBili  0.2  /  AST  54<H>  /  ALT  59  /  AlkPhos  121<H>  03-19      D-Dimer Assay, Quantitative: 2000 ng/mL DDU (03-18-21 @ 17:14)  C-Reactive Protein, Serum: 273 mg/L (03-17-21 @ 12:38)  D-Dimer Assay, Quantitative: 2434 ng/mL DDU (03-17-21 @ 12:38)  Ferritin, Serum: 475 ng/mL (03-17-21 @ 12:38)      Culture - Blood (collected 17 Mar 2021 13:27)  Source: .Blood None  Preliminary Report (18 Mar 2021 19:02):    No growth to date.    Culture - Blood (collected 17 Mar 2021 12:38)  Source: .Blood Blood-Peripheral  Preliminary Report (18 Mar 2021 17:02):    No growth to date.    Culture - Urine (collected 17 Mar 2021 04:15)  Source: .Urine Clean Catch (Midstream)  Final Report (19 Mar 2021 09:57):    No growth    COVID-19 PCR: Detected (03-17-21 @ 14:45)    Radiology: all available radiological tests reviewed    < from: CT Angio Chest PE Protocol w/ IV Cont (03.17.21 @ 14:20) >  Bilateral infiltrates consistent with COVID 19 pneumonia.  No evidence of pulmonary embolus.  < end of copied text >      Advanced directives addressed: full resuscitation

## 2021-03-23 NOTE — CONSULT NOTE ADULT - CONSULT REQUESTED DATE/TIME
18-Mar-2021 10:17
18-Mar-2021 16:51
19-Mar-2021 17:17
23-Mar-2021 03:30
21-Mar-2021 20:43
19-Mar-2021 07:59
22-Mar-2021 00:12

## 2021-03-23 NOTE — DIETITIAN INITIAL EVALUATION ADULT. - ADD RECOMMEND
1) change TF Rx to Jevity 1.5 @ 30mL/hr with 4pkts [employer] TF 2) monitor hydration status; consider free water flushes of 175mL q6hrs (=700mL additional free water daily) 3) monitor TF tolerance; keep back of bed > 35 degrees 4) as patient meets malnutrition criteria, pt is likely deficient in thiamine and vitamin D, consider supplementing to optimize outcome 5) add MVI with minerals daily to ensure 100% RDI met 6) monitor BM; add bowel regimen

## 2021-03-23 NOTE — CONSULT NOTE ADULT - CONSULT REASON
Altered mentation, extension of CVA
Right lower extremity SFA occlusion
stroke
AMS
SOB
svt
AFIB CABG

## 2021-03-23 NOTE — DIETITIAN INITIAL EVALUATION ADULT. - PHYSCIAL ASSESSMENT
rogerio score of 10; SDTI on Rt buttocks.  no BM documented; consider adding bowel regimen underweight

## 2021-03-23 NOTE — PROGRESS NOTE ADULT - ASSESSMENT
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  High risk for acute decompensation and deterioration including death   Patient requires critical care for support of one or more vital organ systems with a high probability of imminent or life threatening deterioration in his/her condition     Plan:    Full vent support--LPV strategy to maintain plateau pressures < 30--High PEEP/low TV--Permissive hypercapnea and acidemia.  Decrease FIO2.  Not candidate for weaning  Cont with DOAC.  Pt not surgical candidate   Cont with Rem/Dexa (7). To d/w Dr domínguez--?? adding BS IV Abx for ischemic limb--concern it may become source of infection   TF   HOB > 30  Neuro checks  DVT prophy--AC    ICU care--d/w ICU staff on multi disciplinary rounds and brother Xavier  updated him on pt's condition.  All concerns addressed including but not limited to diagnosis, treatment plan and overall prognosis

## 2021-03-23 NOTE — DIETITIAN NUTRITION RISK NOTIFICATION - TREATMENT: THE FOLLOWING DIET HAS BEEN RECOMMENDED
Diet, NPO with Tube Feed:   Tube Feeding Modality: Orogastric  Jevity 1.5 Kiko (JEVITY1.5)  Total Volume for 24 Hours (mL): 720  Continuous  Until Goal Tube Feed Rate (mL per Hour): 30  Tube Feed Duration (in Hours): 24  Tube Feed Start Time: 00:00  Free Water Flush  Bolus   Total Volume per Flush (mL): 175   Frequency: Every 4 Hours   Total Daily Volume of Flush (mL): 700    Start Time: 08:00  No Carb Prosource TF     Qty per Day:  4 (03-23-21 @ 10:35) [Pending Verification By Attending]  Diet, NPO with Tube Feed:   Tube Feeding Modality: Orogastric  Jevity 1.5 Kiko (JEVITY1.5)  Total Volume for 24 Hours (mL): 1320  Continuous  Starting Tube Feed Rate {mL per Hour}: 10  Increase Tube Feed Rate by (mL): 10     Every 4 hours  Until Goal Tube Feed Rate (mL per Hour): 55  Tube Feed Duration (in Hours): 24  Tube Feed Start Time: 22:00 (03-21-21 @ 20:41) [Active]

## 2021-03-23 NOTE — DIETITIAN INITIAL EVALUATION ADULT. - MALNUTRITION
severe malnutrition in chronic illness severe malnutrition in chronic illness r/t decreased ability to consume sufficient energy/protein 2/2 liver vs kidney dysfunction? AEB severe muscle/fat wasting

## 2021-03-23 NOTE — PROGRESS NOTE ADULT - ASSESSMENT
81 y/o male with h/o chronic hepatitis C, liver dysfunction, chronic CHF, CKD stage III, recently diagnosed with COVID-19 viral syndrome was admitted on 3/17 for worsening SOB.  Pt tested COVID + on 3/7/21. The pt is a poor historian at baseline. Pt's O2 at home in 80s prompting him to come to ED. In the ER, sats 89% on RA.  Patient started on 5L NC and sats improved to upper 90's.     1. COVID-19 viral syndrome. Acute respiratory failure. Multifocal pneumonia. Right foot ischemia ?underlying cellulitis.   -respiratory worse  -encephalopathy  -worsening leukocytosis with right foot discoloration; possible bacterial infectious process  -on remdesivir protocol # 6  -tolerating abx well so far; no side effects noted  -add cefepime 2 gm IV q12h  -AC  -steroids  -respiratory care  -droplet isolation  -continue  antiviral therapy  -monitor temps  -f/u CBC  -supportive care  2. Other issues:   -care per medicine    Case d/w Dr. Giles

## 2021-03-23 NOTE — PROGRESS NOTE ADULT - ASSESSMENT
79 y/o male with pmhx of hepatitis C, CKD III, HFrEF (35%), poor historian at baseline admitted on 3/17 with SOB, positive for COVID-10 on 3/7. Throughout hospital course patients mental status worsened and he developed right sided weakness. MRI showed watershed infarcts and he was also found to have paroxysmal afib. RRT called on 3/22 for worsening mental status, patient was intubated for airway protection. Repeat CT head with new caudate nucleus ischemic CVA and evolution of prior ischemic CVAs. Post intubation with profound hypoxia.       1. covid-19 pna  2. ARDS  3. acute hypoxic respiratory failure  4. acute CVA  5. r/o RLE ischemia    NEURO: propofol for vent synchrony. daily SAT. asa, statin. was outside of window for tpa and intervention. carotid u/s showed stenosis, intervention deferred due to instability.     CVS: HD stable off of pressors. rate controlled.   PULM: lung protective vent strategy. vent bundle in place. fio2 weaned to 50%, remains on peep of 10. mental status and peep precludes extubation.   GI: protonix daily for gi prophylaxis. tube feeds as tolerated.   RENAL: no active issues    ID: on remdesevir, decadron. received 1 dose of actemra when oxygenation deteriorated post intubation. deferred to day team for f/u dosing as he responded well.    ENDO: q6 hour accuchecks  HEME: on eliquis for afib. stat lactate and RLE doppler to r/o ischemia.  DISPO: full code. prognosis poor.  81 y/o male with pmhx of hepatitis C, CKD III, HFrEF (35%), poor historian at baseline admitted on 3/17 with SOB, positive for COVID-10 on 3/7. Throughout hospital course patients mental status worsened and he developed right sided weakness. MRI showed watershed infarcts and he was also found to have paroxysmal afib. RRT called on 3/22 for worsening mental status, patient was intubated for airway protection. Repeat CT head with new caudate nucleus ischemic CVA and evolution of prior ischemic CVAs. Post intubation with profound hypoxia.       1. covid-19 pna  2. ARDS  3. acute hypoxic respiratory failure  4. acute CVA  5. r/o RLE ischemia    NEURO: propofol for vent synchrony. daily SAT. asa, statin. was outside of window for tpa and intervention. carotid u/s showed stenosis, intervention deferred due to instability.     CVS: HD stable off of pressors. rate controlled.   PULM: lung protective vent strategy. vent bundle in place. fio2 weaned to 50%, remains on peep of 10. mental status and peep precludes extubation.   GI: protonix daily for gi prophylaxis. tube feeds as tolerated.   RENAL: no active issues    ID: on remdesevir, decadron. received 1 dose of actemra when oxygenation deteriorated post intubation. deferred to day team for f/u dosing as he responded well.    ENDO: q6 hour accuchecks  HEME: on eliquis for afib. stat lactate and RLE doppler to r/o ischemia.  DISPO: full code. prognosis poor.    addendum: occluded SFA with lactate of 2.4. vascular service consulted. discussed case with surgical resident at 02:51

## 2021-03-23 NOTE — CONSULT NOTE ADULT - SUBJECTIVE AND OBJECTIVE BOX
PER HPI    Pt is a 81 y/o male with pmhx of hepatitis C, CKD III, HFrEF (35%), poor historian at baseline admitted on 3/17 with SOB, positive for COVID-10 on 3/7. Throughout hospital course patients mental status worsened and he developed right sided weakness. MRI showed watershed infarcts and he was also found to have paroxysmal afib. RRT called on 3/22 for worsening mental status, patient was intubated for airway protection. Repeat CT head with new caudate nucleus ischemic CVA and evolution of prior ischemic CVAs. Post intubation with profound hypoxia.     3/23/21    Vascular surgery called to eval a cool right lower extremity with vascular duplex showing SFA occlusion in prior stent with no inflow. Pt unable to provide hx as intubated and sedated. Per nursing staff she coolness first noted around 1800 today with evolution of mottling to above ankle. No dopplerable/palpable signals of right PT/DP/Pop. VSS. ROS unable to obtain     Vital Signs Last 24 Hrs  T(C): 38.2 (23 Mar 2021 00:00), Max: 38.2 (22 Mar 2021 06:00)  T(F): 100.7 (23 Mar 2021 00:00), Max: 100.7 (22 Mar 2021 06:00)  HR: 89 (23 Mar 2021 03:00) (78 - 90)  BP: 136/60 (23 Mar 2021 03:00) (103/61 - 146/69)  BP(mean): 77 (23 Mar 2021 03:00) (68 - 85)  RR: 39 (23 Mar 2021 03:00) (33 - 41)  SpO2: 92% (23 Mar 2021 03:00) (85% - 99%)    PHYSICAL EXAM:  Constitutional: Intubated and sedated   Eyes:  WNL  ENMT:  WNL  Neck:  WNL, non tender  Back: Non tender  Respiratory: CTABL  Cardiovascular:  S1+S2+0  Gastrointestinal: Soft, ND , NT  Genitourinary:  WNL  Extremities: NV intact  Vascular:  Right lower extremity: cool, non palp/dopplerable dp/pt/pop, palp femoral, mottling noted to above ankle  Left lower extremity: warm, Palp femoral, dopplerable pt, non dopplerable dp  Neurological: No focal neurological deficit,  CN, motor and sensory system grossly intact.  Skin: WNL  Psychiatric: Grossly WNL                          12.1   24.66 )-----------( 557      ( 22 Mar 2021 07:24 )             37.7     03-22    147<H>  |  114<H>  |  53<H>  ----------------------------<  115<H>  4.1   |  26  |  1.09    Ca    8.7      22 Mar 2021 07:24  Phos  4.2     03-22  Mg     2.7     03-22    TPro  6.9  /  Alb  2.1<L>  /  TBili  0.7  /  DBili  x   /  AST  67<H>  /  ALT  68  /  AlkPhos  132<H>  03-22    INTERPRETATION:  [facility] RADIOLOGIST PRELIMINARY REPORT    PROCEDURE INFORMATION:  Exam: US Duplex Right Lower Extremity Arteries Or Arterial Bypass Grafts  Exam date and time: 3/23/2021 12:34 AM  Age: 80 years old  Clinical indication: Other: Cold RT le/foot and no pulses felt. ; Patient HX:  Stent RT le fa    TECHNIQUE:  Imaging protocol: Right Real-time duplex scan of the arteries or arterial  bypass grafts of the right lower extremity with 2-D gray scale, color Doppler  flow and spectral waveform analysis. Images documented and saved.    COMPARISON:  No relevant prior studies available.    FINDINGS:  Right common femoral artery:  At least mild irregular luminal narrowing.  Biphasic flow, nonspecific.  Peak systolic velocity is 65.9 cm/s.  Right superficial femoral artery:  No flow is identified within the right SFA,  through the stent graft, to the level of the popliteal arteries.  Right popliteal artery: No occlusion or significant stenosis. Normal waveform.  Right calf/foot arteries:  The right dorsalis pedis artery is not identified.    Soft tissues: No hematoma or collection.    IMPRESSION:  1.  Occlusion of the right SFA, through the stent graft, to level of the  popliteal arteries.  2. At least mild, luminal, irregular narrowing of the CFA.     PER HPI    Pt is a 81 y/o male with pmhx of hepatitis C, CKD III, HFrEF (35%), poor historian at baseline admitted on 3/17 with SOB, positive for COVID-10 on 3/7. Throughout hospital course patients mental status worsened and he developed right sided weakness. MRI showed watershed infarcts and he was also found to have paroxysmal afib. RRT called on 3/22 for worsening mental status, patient was intubated for airway protection. Repeat CT head with new caudate nucleus ischemic CVA and evolution of prior ischemic CVAs. Post intubation with profound hypoxia.     3/23/21    Vascular surgery called to eval a cool right lower extremity with vascular duplex showing SFA occlusion in prior stent. Pt unable to provide hx as intubated and sedated. Per nursing staff first noted coolness around 1800 today with evolution of mottling to above ankle throughout the evening . No dopplerable/palpable signals of right PT/DP/Pop. VSS. ROS unable to obtain     Vital Signs Last 24 Hrs  T(C): 38.2 (23 Mar 2021 00:00), Max: 38.2 (22 Mar 2021 06:00)  T(F): 100.7 (23 Mar 2021 00:00), Max: 100.7 (22 Mar 2021 06:00)  HR: 89 (23 Mar 2021 03:00) (78 - 90)  BP: 136/60 (23 Mar 2021 03:00) (103/61 - 146/69)  BP(mean): 77 (23 Mar 2021 03:00) (68 - 85)  RR: 39 (23 Mar 2021 03:00) (33 - 41)  SpO2: 92% (23 Mar 2021 03:00) (85% - 99%)    PHYSICAL EXAM:  Constitutional: Intubated and sedated   Eyes:  WNL  ENMT:  WNL  Neck:  WNL, non tender  Back: Non tender  Respiratory: CTABL  Cardiovascular:  S1+S2+0  Gastrointestinal: Soft, ND , NT  Genitourinary:  WNL  Extremities: NV intact  Vascular:  Right lower extremity: cool, non palp/dopplerable dp/pt/pop, palp femoral, mottling noted to above ankle  Left lower extremity: warm, Palp femoral, dopplerable pt, non dopplerable dp  Neurological: No focal neurological deficit,  CN, motor and sensory system grossly intact.  Skin: WNL  Psychiatric: Grossly WNL                          12.1   24.66 )-----------( 557      ( 22 Mar 2021 07:24 )             37.7     03-22    147<H>  |  114<H>  |  53<H>  ----------------------------<  115<H>  4.1   |  26  |  1.09    Ca    8.7      22 Mar 2021 07:24  Phos  4.2     03-22  Mg     2.7     03-22    TPro  6.9  /  Alb  2.1<L>  /  TBili  0.7  /  DBili  x   /  AST  67<H>  /  ALT  68  /  AlkPhos  132<H>  03-22    INTERPRETATION:  [facility] RADIOLOGIST PRELIMINARY REPORT    PROCEDURE INFORMATION:  Exam: US Duplex Right Lower Extremity Arteries Or Arterial Bypass Grafts  Exam date and time: 3/23/2021 12:34 AM  Age: 80 years old  Clinical indication: Other: Cold RT le/foot and no pulses felt. ; Patient HX:  Stent RT le fa    TECHNIQUE:  Imaging protocol: Right Real-time duplex scan of the arteries or arterial  bypass grafts of the right lower extremity with 2-D gray scale, color Doppler  flow and spectral waveform analysis. Images documented and saved.    COMPARISON:  No relevant prior studies available.    FINDINGS:  Right common femoral artery:  At least mild irregular luminal narrowing.  Biphasic flow, nonspecific.  Peak systolic velocity is 65.9 cm/s.  Right superficial femoral artery:  No flow is identified within the right SFA,  through the stent graft, to the level of the popliteal arteries.  Right popliteal artery: No occlusion or significant stenosis. Normal waveform.  Right calf/foot arteries:  The right dorsalis pedis artery is not identified.    Soft tissues: No hematoma or collection.    IMPRESSION:  1.  Occlusion of the right SFA, through the stent graft, to level of the  popliteal arteries.  2. At least mild, luminal, irregular narrowing of the CFA.

## 2021-03-23 NOTE — DIETITIAN INITIAL EVALUATION ADULT. - PERTINENT MEDS FT
MEDICATIONS  (STANDING):  apixaban 5 milliGRAM(s) Oral every 12 hours  aspirin  chewable 81 milliGRAM(s) Oral daily  atorvastatin 40 milliGRAM(s) Oral at bedtime  budesonide 160 MICROgram(s)/formoterol 4.5 MICROgram(s) Inhaler 2 Puff(s) Inhalation two times a day  carvedilol 12.5 milliGRAM(s) Oral every 12 hours  chlorhexidine 0.12% Liquid 15 milliLiter(s) Oral Mucosa every 12 hours  dexAMETHasone  Injectable 6 milliGRAM(s) IV Push daily  pantoprazole  Injectable 40 milliGRAM(s) IV Push daily  propofol Infusion 10 MICROgram(s)/kG/Min (3.56 mL/Hr) IV Continuous <Continuous>  remdesivir  IVPB 100 milliGRAM(s) IV Intermittent every 24 hours    MEDICATIONS  (PRN):  acetaminophen   Tablet .. 650 milliGRAM(s) Oral every 4 hours PRN Temp greater or equal to 38.5C (101.3F)  ALBUTerol    90 MICROgram(s) HFA Inhaler 2 Puff(s) Inhalation every 4 hours PRN Shortness of Breath and/or Wheezing  aluminum hydroxide/magnesium hydroxide/simethicone Suspension 30 milliLiter(s) Oral every 4 hours PRN Dyspepsia  benzonatate 100 milliGRAM(s) Oral three times a day PRN Cough  guaifenesin/dextromethorphan  Syrup 10 milliLiter(s) Oral every 4 hours PRN Cough  ondansetron Injectable 4 milliGRAM(s) IV Push every 6 hours PRN Nausea and/or Vomiting

## 2021-03-23 NOTE — PROGRESS NOTE ADULT - SUBJECTIVE AND OBJECTIVE BOX
Patient is a 80y old  Male who presents with a chief complaint of COVID PNA (22 Mar 2021 11:10)      BRIEF HOSPITAL COURSE: 79 y/o male with pmhx of hepatitis C, CKD III, HFrEF (35%), poor historian at baseline admitted on 3/17 with SOB, positive for COVID-10 on 3/7. Throughout hospital course patients mental status worsened and he developed right sided weakness. MRI showed watershed infarcts and he was also found to have paroxysmal afib. RRT called on 3/22 for worsening mental status, patient was intubated for airway protection. Repeat CT head with new caudate nucleus ischemic CVA and evolution of prior ischemic CVAs. Post intubation with profound hypoxia.       Events last 24 hours: received on peep of 10, fio2 60%. new, cold RLE without dopplerable pulses.     PAST MEDICAL & SURGICAL HISTORY:  Liver dysfunction    CKD (chronic kidney disease)    CHF (congestive heart failure)    Hepatitis C        Review of Systems:  unable to obtain due to current clinical condition.       Medications:  remdesivir  IVPB 100 milliGRAM(s) IV Intermittent every 24 hours    carvedilol 12.5 milliGRAM(s) Oral every 12 hours    ALBUTerol    90 MICROgram(s) HFA Inhaler 2 Puff(s) Inhalation every 4 hours PRN  benzonatate 100 milliGRAM(s) Oral three times a day PRN  budesonide 160 MICROgram(s)/formoterol 4.5 MICROgram(s) Inhaler 2 Puff(s) Inhalation two times a day  guaifenesin/dextromethorphan  Syrup 10 milliLiter(s) Oral every 4 hours PRN    acetaminophen   Tablet .. 650 milliGRAM(s) Oral every 4 hours PRN  ondansetron Injectable 4 milliGRAM(s) IV Push every 6 hours PRN  propofol Infusion 10 MICROgram(s)/kG/Min IV Continuous <Continuous>      apixaban 5 milliGRAM(s) Oral every 12 hours  aspirin enteric coated 81 milliGRAM(s) Oral daily    aluminum hydroxide/magnesium hydroxide/simethicone Suspension 30 milliLiter(s) Oral every 4 hours PRN  pantoprazole  Injectable 40 milliGRAM(s) IV Push daily      atorvastatin 40 milliGRAM(s) Oral at bedtime  dexAMETHasone  Injectable 6 milliGRAM(s) IV Push daily        chlorhexidine 0.12% Liquid 15 milliLiter(s) Oral Mucosa every 12 hours        Mode: AC/ CMV (Assist Control/ Continuous Mandatory Ventilation)  RR (machine): 24  TV (machine): 375  FiO2: 50  PEEP: 10  ITime: 1  MAP: 12  PIP: 20      ICU Vital Signs Last 24 Hrs  T(C): 38.2 (23 Mar 2021 00:00), Max: 38.2 (22 Mar 2021 06:00)  T(F): 100.7 (23 Mar 2021 00:00), Max: 100.7 (22 Mar 2021 06:00)  HR: 86 (23 Mar 2021 00:00) (77 - 91)  BP: 127/50 (23 Mar 2021 00:00) (103/61 - 146/69)  BP(mean): 69 (23 Mar 2021 00:00) (67 - 85)  ABP: --  ABP(mean): --  RR: 37 (23 Mar 2021 00:00) (27 - 41)  SpO2: 93% (23 Mar 2021 00:00) (85% - 99%)      ABG - ( 22 Mar 2021 05:48 )  pH, Arterial: 7.43  pH, Blood: x     /  pCO2: 40    /  pO2: 70    / HCO3: 26    / Base Excess: 2.2   /  SaO2: 92                  I&O's Detail    21 Mar 2021 07:01  -  22 Mar 2021 07:00  --------------------------------------------------------  IN:    Enteral Tube Flush: 125 mL    IV PiggyBack: 100 mL    Jevity 1.5: 19 mL    Propofol: 111 mL  Total IN: 355 mL    OUT:    Voided (mL): 1000 mL  Total OUT: 1000 mL    Total NET: -645 mL      22 Mar 2021 07:01  -  23 Mar 2021 00:47  --------------------------------------------------------  IN:    IV PiggyBack: 300 mL    Jevity 1.5: 198 mL    Propofol: 208 mL  Total IN: 706 mL    OUT:    Intermittent Catheterization - Urethral (mL): 425 mL  Total OUT: 425 mL    Total NET: 281 mL            LABS:                        12.1   24.66 )-----------( 557      ( 22 Mar 2021 07:24 )             37.7     03-22    147<H>  |  114<H>  |  53<H>  ----------------------------<  115<H>  4.1   |  26  |  1.09    Ca    8.7      22 Mar 2021 07:24  Phos  4.2     03-22  Mg     2.7     03-22    TPro  6.9  /  Alb  2.1<L>  /  TBili  0.7  /  DBili  x   /  AST  67<H>  /  ALT  68  /  AlkPhos  132<H>  03-22          CAPILLARY BLOOD GLUCOSE      POCT Blood Glucose.: 159 mg/dL (21 Mar 2021 20:09)    PT/INR - ( 22 Mar 2021 07:24 )   PT: 20.1 sec;   INR: 1.78 ratio             CULTURES:  Culture Results:   No Growth Final (03-17 @ 13:27)  Culture Results:   No Growth Final (03-17 @ 12:38)  Culture Results:   No growth (03-17 @ 04:15)      Physical Examination:    General: No acute distress.      HEENT: Pupils equal, reactive to light.  Symmetric.    PULM: Clear to auscultation bilaterally, no significant sputum production    NECK: Supple, no lymphadenopathy, trachea midline    CVS: Regular rate and rhythm, no murmurs, rubs, or gallops    ABD: Soft, nondistended, nontender, normoactive bowel sounds, no masses    EXT: RLE cold. LLE warm. no DP or PT pulses with doppler on RLE.    NEURO: overbreathes vent. does not awaken or follow commands off of sedation.    DEVICES:     RADIOLOGY: reviewed    CRITICAL CARE TIME SPENT: 40 minutes of critical care time spent providing medical care for patient's acute illness/conditions that impairs at least one vital organ system and/or poses a high risk of imminent or life threatening deterioration in the patient's condition. It includes time spent evaluating and treating the patient's acute illness as well as time spent reviewing labs, radiology, discussing goals of care with patient and/or patient's family, and discussing the case with a multidisciplinary team in an effort to prevent further life threatening deterioration or end organ damage. This time is independent of any procedures performed.

## 2021-03-23 NOTE — DIETITIAN INITIAL EVALUATION ADULT. - OTHER INFO
79yo male with PMH significant for hepatitis C, liver dysfunction, chronic HFrEF (35%), CKD3 p/w SOB, tested COVID (+) on 3/7/21.  Pt's girlfriend also has COVID.  PMD reports pt is poor historian at baseline.  hospital course c/b worsening mentation, Brain MR revealed Lt watershed infarcts and PAfib, s/p intubation on 3/21.  b/l front and Lt CVA and new Rt CN CVA.  prognosis poor.    *propofol infusing at 14.3mL/hr (=378Kcal/day).

## 2021-03-23 NOTE — CONSULT NOTE ADULT - ATTENDING COMMENTS
Delayed entry  Patient seen and examined 3/23/21  Remains critically ill, intubated for COVID pneumonia  He is non responsive off sedation, with recent acute stroke.  His right foot is cool to just above the ankle. Femoral pulses are palpable but no RLE distal pulse  Arterial duplex demonstrates right SFA occlusion  At this point, there is no clear benefit to urgent right leg revascularization as patient remains gravely ill.  Will await clinical improvement prior to considering any intervention. He is at high risk for limb loss and eventual high amputation

## 2021-03-23 NOTE — CONSULT NOTE ADULT - PROVIDER SPECIALTY LIST ADULT
Infectious Disease
Cardiology
Neurology
TeleCritical Care
Vascular Surgery
Critical Care
Electrophysiology

## 2021-03-24 NOTE — PROGRESS NOTE ADULT - ASSESSMENT
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  High risk for acute decompensation and deterioration including death   Patient requires critical care for support of one or more vital organ systems with a high probability of imminent or life threatening deterioration in his/her condition     Plan:    Full vent support--LPV strategy to maintain plateau pressures < 30--High PEEP/low TV--Permissive hypercapnea and acidemia.  Decrease FIO2.  Not candidate for weaning  Cont with DOAC.  Pt not surgical candidate   Cont with Rem/Dexa (7). To d/w Dr domínguez--?? adding BS IV Abx for ischemic limb--concern it may become source of infection   TF   HOB > 30  Neuro checks  DVT prophy--AC    ICU care--d/w ICU staff on multi disciplinary rounds and brother Xavier  updated him on pt's condition.  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  High risk for acute decompensation and deterioration including death   Patient requires critical care for support of one or more vital organ systems with a high probability of imminent or life threatening deterioration in his/her condition     Plan:    Full vent support--LPV strategy to maintain plateau pressures < 30--High PEEP/low TV--Permissive hypercapnea and acidemia.  Decrease FIO2  Cont with propofol gtt.  Can add lorazepam   Cont with DOAC.  Pt not surgical candidate   Cont with Rem/Dexa (8). Agree with Cefepime (2) as the limb will become septic source  DC statin  Cont to trend LFT  TF   HOB > 30  DVT prophy--AC    ICU care--d/w ICU staff on multi disciplinary rounds and brother Xavier  updated him on pt's condition.  All concerns addressed including but not limited to diagnosis, treatment plan and overall prognosis

## 2021-03-24 NOTE — PROGRESS NOTE ADULT - SUBJECTIVE AND OBJECTIVE BOX
Hospital D # 7  ICU # 3  Vent # 3    CC:  Respiratory Failure     HPI:    81 y/o male with hepatitis C, liver dysfunction, chronic HFrEF (35%) and CKD stage III who presents to the ED c/o SOB.  Pt tested COVID + on 3/7/21.  As per PMD, patient's girlfriend also has COVID.  PMD reports pt is a poor historian at baseline.  Pt's O2 at home in 80s prompting him to come to ED.  In the ER, sats 89% on RA.  Patient started on 5L NC and sats improved to upper 90's.  WBC elevated @ 13.  DDimer high 2434.  CTA negative for PE but positive for bilateral ground glass opacities c/w COVID-19  Hospital course complicated by worsening mentation.  Brain MR revealed L watershed infarcts and PAFib started on AC    3/22:  Case d/w ALVARO Brar.  Pt seen and examined in ICU.  RRT O/N for obtundation.  Intubated.  HCT reveal Evolving prior B/L frontal and L CVA and new R CN CVA . Vented on .  Not responsive off sedation.  Poor [place]     3/23:  Case d/w ALVARO Brar.  Acute ischemic RLE.  Cool.  Leukocytosis.  Tm 100.9  Not awake off sedation. Doing very poorly    3/24:  Vented and sedated    PMH:  As above.     PSH:  As above.     FH: Non Contributory other than those listed in HPI    Social History:      MEDICATIONS  (STANDING):  apixaban 5 milliGRAM(s) Oral every 12 hours  aspirin  chewable 81 milliGRAM(s) Oral daily  budesonide 160 MICROgram(s)/formoterol 4.5 MICROgram(s) Inhaler 2 Puff(s) Inhalation two times a day  carvedilol 12.5 milliGRAM(s) Oral every 12 hours  cefepime   IVPB 2000 milliGRAM(s) IV Intermittent every 12 hours  chlorhexidine 0.12% Liquid 15 milliLiter(s) Oral Mucosa every 12 hours  dexAMETHasone  Injectable 6 milliGRAM(s) IV Push daily  dexMEDEtomidine Infusion 0.7 MICROgram(s)/kG/Hr (10.4 mL/Hr) IV Continuous <Continuous>  pantoprazole  Injectable 40 milliGRAM(s) IV Push daily  propofol Infusion 10 MICROgram(s)/kG/Min (3.56 mL/Hr) IV Continuous <Continuous>  remdesivir  IVPB 100 milliGRAM(s) IV Intermittent every 24 hours    MEDICATIONS  (PRN):  acetaminophen   Tablet .. 650 milliGRAM(s) Oral every 4 hours PRN Temp greater or equal to 38.5C (101.3F)  ALBUTerol    90 MICROgram(s) HFA Inhaler 2 Puff(s) Inhalation every 4 hours PRN Shortness of Breath and/or Wheezing  ondansetron Injectable 4 milliGRAM(s) IV Push every 6 hours PRN Nausea and/or Vomiting      Allergies: NKDA    ROS:  SEE BELOW        ICU Vital Signs Last 24 Hrs  T(C): 37.8 (24 Mar 2021 06:00), Max: 38.4 (23 Mar 2021 12:00)  T(F): 100.1 (24 Mar 2021 06:00), Max: 101.2 (23 Mar 2021 12:00)  HR: 94 (24 Mar 2021 09:16) (87 - 125)  BP: 114/67 (24 Mar 2021 09:00) (94/46 - 160/73)  BP(mean): 76 (24 Mar 2021 09:00) (57 - 102)  ABP: --  ABP(mean): --  RR: 35 (24 Mar 2021 09:00) (32 - 38)  SpO2: 96% (24 Mar 2021 09:16) (93% - 100%)      Mode: AC/ CMV (Assist Control/ Continuous Mandatory Ventilation)  RR (machine): 24  TV (machine): 375  FiO2: 55  PEEP: 10  ITime: 1  MAP: 10  PIP: 21      I&O's Summary    23 Mar 2021 07:01  -  24 Mar 2021 07:00  --------------------------------------------------------  IN: 2637.6 mL / OUT: 650 mL / NET: 1987.6 mL        Physical Exam:  SEE BELOW                          12.7   27.04 )-----------( 557      ( 24 Mar 2021 06:54 )             40.3       03-24    146<H>  |  114<H>  |  50<H>  ----------------------------<  111<H>  4.7   |  28  |  1.00    Ca    8.4<L>      24 Mar 2021 06:54  Phos  2.6     03-24  Mg     2.6     03-24    TPro  6.6  /  Alb  2.1<L>  /  TBili  0.5  /  DBili  0.2  /  AST  134<H>  /  ALT  110<H>  /  AlkPhos  151<H>  03-24      CARDIAC MARKERS ( 24 Mar 2021 06:54 )  x     / x     / 1237 U/L / x     / x      CARDIAC MARKERS ( 23 Mar 2021 10:20 )  x     / x     / 1222 U/L / x     / x            ABG - ( 23 Mar 2021 05:59 )  pH, Arterial: 7.46  pH, Blood: x     /  pCO2: 36    /  pO2: 81    / HCO3: 25    / Base Excess: 1.7   /  SaO2: 95                  Urinalysis Basic - ( 23 Mar 2021 01:28 )    Color: Yellow / Appearance: Clear / S.015 / pH: x  Gluc: x / Ketone: Negative  / Bili: Negative / Urobili: 4 mg/dL   Blood: x / Protein: 30 mg/dL / Nitrite: Negative   Leuk Esterase: Negative / RBC: 0-2 /HPF / WBC 0-2   Sq Epi: x / Non Sq Epi: Occasional / Bacteria: Occasional        DVT Prophylaxis:                                                            Contraindication:     Advanced Directives:    Discussed with:    Visit Information:  Time spent excluding procedure:      ** Time is exclusive of billed procedures and/or teaching and/or routine family updates.             Hospital D # 7  ICU # 3  Vent # 3    CC:  Respiratory Failure     HPI:    81 y/o male with hepatitis C, liver dysfunction, chronic HFrEF (35%) and CKD stage III who presents to the ED c/o SOB.  Pt tested COVID + on 3/7/21.  As per PMD, patient's girlfriend also has COVID.  PMD reports pt is a poor historian at baseline.  Pt's O2 at home in 80s prompting him to come to ED.  In the ER, sats 89% on RA.  Patient started on 5L NC and sats improved to upper 90's.  WBC elevated @ 13.  DDimer high 2434.  CTA negative for PE but positive for bilateral ground glass opacities c/w COVID-19  Hospital course complicated by worsening mentation.  Brain MR revealed L watershed infarcts and PAFib started on AC    3/22:  Case d/w ALVARO Brar.  Pt seen and examined in ICU.  RRT O/N for obtundation.  Intubated.  HCT reveal Evolving prior B/L frontal and L CVA and new R CN CVA . Vented on .  Not responsive off sedation.  Poor [place]     3/23:  Case d/w ALVARO Brar.  Acute ischemic RLE.  Cool.  Leukocytosis.  Tm 100.9  Not awake off sedation. Doing very poorly    3/24:  Vented and sedated.  Dannie fever O/N.  CK 1200.  R ankle with delineation of ischemia.   No pressors yet.      PMH:  As above.     PSH:  As above.     FH: Non Contributory other than those listed in HPI    Social History:  Unobtainable due to clinical condition     MEDICATIONS  (STANDING):  apixaban 5 milliGRAM(s) Oral every 12 hours  aspirin  chewable 81 milliGRAM(s) Oral daily  budesonide 160 MICROgram(s)/formoterol 4.5 MICROgram(s) Inhaler 2 Puff(s) Inhalation two times a day  carvedilol 12.5 milliGRAM(s) Oral every 12 hours  cefepime   IVPB 2000 milliGRAM(s) IV Intermittent every 12 hours  chlorhexidine 0.12% Liquid 15 milliLiter(s) Oral Mucosa every 12 hours  dexAMETHasone  Injectable 6 milliGRAM(s) IV Push daily  dexMEDEtomidine Infusion 0.7 MICROgram(s)/kG/Hr (10.4 mL/Hr) IV Continuous <Continuous>  pantoprazole  Injectable 40 milliGRAM(s) IV Push daily  propofol Infusion 10 MICROgram(s)/kG/Min (3.56 mL/Hr) IV Continuous <Continuous>  remdesivir  IVPB 100 milliGRAM(s) IV Intermittent every 24 hours    MEDICATIONS  (PRN):  acetaminophen   Tablet .. 650 milliGRAM(s) Oral every 4 hours PRN Temp greater or equal to 38.5C (101.3F)  ALBUTerol    90 MICROgram(s) HFA Inhaler 2 Puff(s) Inhalation every 4 hours PRN Shortness of Breath and/or Wheezing  ondansetron Injectable 4 milliGRAM(s) IV Push every 6 hours PRN Nausea and/or Vomiting      Allergies: NKDA    ROS:  SEE BELOW        ICU Vital Signs Last 24 Hrs  T(C): 37.8 (24 Mar 2021 06:00), Max: 38.4 (23 Mar 2021 12:00)  T(F): 100.1 (24 Mar 2021 06:00), Max: 101.2 (23 Mar 2021 12:00)  HR: 94 (24 Mar 2021 09:16) (87 - 125)  BP: 114/67 (24 Mar 2021 09:00) (94/46 - 160/73)  BP(mean): 76 (24 Mar 2021 09:00) (57 - 102)  ABP: --  ABP(mean): --  RR: 35 (24 Mar 2021 09:00) (32 - 38)  SpO2: 96% (24 Mar 2021 09:16) (93% - 100%)      Mode: AC/ CMV (Assist Control/ Continuous Mandatory Ventilation)  RR (machine): 24  TV (machine): 375  FiO2: 55  PEEP: 10  ITime: 1  MAP: 10  PIP: 21      I&O's Summary    23 Mar 2021 07:01  -  24 Mar 2021 07:00  --------------------------------------------------------  IN: 2637.6 mL / OUT: 650 mL / NET: 1987.6 mL        Physical Exam:  SEE BELOW                          12.7   27.04 )-----------( 557      ( 24 Mar 2021 06:54 )             40.3       03-24    146<H>  |  114<H>  |  50<H>  ----------------------------<  111<H>  4.7   |  28  |  1.00    Ca    8.4<L>      24 Mar 2021 06:54  Phos  2.6     03-24  Mg     2.6     03-24    TPro  6.6  /  Alb  2.1<L>  /  TBili  0.5  /  DBili  0.2  /  AST  134<H>  /  ALT  110<H>  /  AlkPhos  151<H>  03-24      CARDIAC MARKERS ( 24 Mar 2021 06:54 )  x     / x     / 1237 U/L / x     / x      CARDIAC MARKERS ( 23 Mar 2021 10:20 )  x     / x     / 1222 U/L / x     / x            ABG - ( 23 Mar 2021 05:59 )  pH, Arterial: 7.46  pH, Blood: x     /  pCO2: 36    /  pO2: 81    / HCO3: 25    / Base Excess: 1.7   /  SaO2: 95                  Urinalysis Basic - ( 23 Mar 2021 01:28 )    Color: Yellow / Appearance: Clear / S.015 / pH: x  Gluc: x / Ketone: Negative  / Bili: Negative / Urobili: 4 mg/dL   Blood: x / Protein: 30 mg/dL / Nitrite: Negative   Leuk Esterase: Negative / RBC: 0-2 /HPF / WBC 0-2   Sq Epi: x / Non Sq Epi: Occasional / Bacteria: Occasional        DVT Prophylaxis:                                                            Contraindication:     Advanced Directives:    Discussed with:    Visit Information:  Time spent excluding procedure:      ** Time is exclusive of billed procedures and/or teaching and/or routine family updates.

## 2021-03-24 NOTE — PROGRESS NOTE ADULT - ASSESSMENT
79 y/o male with h/o chronic hepatitis C, liver dysfunction, chronic CHF, CKD stage III, recently diagnosed with COVID-19 viral syndrome was admitted on 3/17 for worsening SOB.  Pt tested COVID + on 3/7/21. The pt is a poor historian at baseline. Pt's O2 at home in 80s prompting him to come to ED. In the ER, sats 89% on RA.  Patient started on 5L NC and sats improved to upper 90's.     1. COVID-19 viral syndrome. Acute respiratory failure. Multifocal pneumonia. Right foot ischemia ?underlying cellulitis.   -respiratory frail  -encephalopathy  -leukocytosis sightly improved  -on remdesivir protocol # 7  -on cefepime 2 gm IV q12h # 2  -tolerating abx well so far; no side effects noted  -AC  -steroids  -respiratory care  -droplet isolation  -continue  antimicrobial therapy  -monitor temps  -f/u CBC  -supportive care  2. Other issues:   -care per medicine    Case d/w Dr. Giles

## 2021-03-24 NOTE — PROGRESS NOTE ADULT - ASSESSMENT
Impression:  1. acute hypoxemic respiratory failure  2. severe protein malnutrition  3. COVID-19 Viral pneumonia  4. transaminitis  5. acute CVA  6. acute ischemic RLE  7. rhabdomyolysis  8. chronic HFrEF    Plan:  Neuro - Sedation to facilitate safe ventilation, will add precedex to augment in attempt to decrease propofol dose, no significant MS reportedly with sedation vacations, new acute                multi-territory CVAs, cont ASA and statin    CV -  hemodynamics stable          EF 35% with septal HK, chronic          AC with full dose eliquis, hx Hep allergy          keep K~4 and Mg>2 for optimal arrhythmia suppression          cont coreg, consideration for ACE addition in next 24hours if BP remains stable          RLE ischemic with SFA stent occlusion, not a surgical candidate, f/u vascular, cont full dose AC    Pulm -  full vent support with LTV 6-8cc/kg IDW, plts currently 20, keeping <30             actively titrating FiO2 for sats>88%, weaned from 55% to 45%             utilization of PEEP for alveolar recruitment             Vent bundle in place and reviewed     GI -  PPI, enteric feeds as tolerated as per RD recs, LFTs rising slowly, monitor closely if cont to rise will need to d/c Remdesivir and statin    Renal - Cr stable,  Even to negative fluid balance as tolerated by hemodynamics and renal fx.    Heme -  full dose AC with eliquis, no signs of active bleeding    ID - Empiric IV abx with Cefepime given RLE ischemia, Cx NG, cont Remdesivir and Decadron for full course, Abx adjustment/discontinuation based on discussion with ID in conjunction         with clinical features and culture data.      Endo -  Aggressive glycemic control to limit FS glucose to < 180mg/dl, BS stable

## 2021-03-24 NOTE — PROGRESS NOTE ADULT - SUBJECTIVE AND OBJECTIVE BOX
Patient is a 80y old  Male who presents with a chief complaint of COVID PNA (23 Mar 2021 10:22)      BRIEF HOSPITAL COURSE:  80M with PMHx of Hep C, CKD III, HFrEF (35%) who presented with SOB and +COVID. Admitted to medical floor. Course complicated by AMS and R sided weakness. Found to have new watershed CVA  with pAF. Started on full AC. Subsequent RRT for MS decline, intubated for airway protection. Repeat Head CT with new acute evolving CVAs. Worsening hypoxia on vent and RLE limb ischemia.    Events last 24 hours: afebrile overnight, fever yesterday, BP stable, remains on full vent support, received on 55%/+10, tolerating TF, remains sedated on propofol for pt vent synchrony, no real mental status with sedation vacation.      PAST MEDICAL & SURGICAL HISTORY:  Liver dysfunction    CKD (chronic kidney disease)    CHF (congestive heart failure)    Hepatitis C        Review of Systems:  unable to obtain at this time, pt sedated and intubated      Medications:  cefepime   IVPB 2000 milliGRAM(s) IV Intermittent every 12 hours  remdesivir  IVPB 100 milliGRAM(s) IV Intermittent every 24 hours    carvedilol 12.5 milliGRAM(s) Oral every 12 hours    ALBUTerol    90 MICROgram(s) HFA Inhaler 2 Puff(s) Inhalation every 4 hours PRN  budesonide 160 MICROgram(s)/formoterol 4.5 MICROgram(s) Inhaler 2 Puff(s) Inhalation two times a day    acetaminophen   Tablet .. 650 milliGRAM(s) Oral every 4 hours PRN  dexMEDEtomidine Infusion 0.7 MICROgram(s)/kG/Hr IV Continuous <Continuous>  ondansetron Injectable 4 milliGRAM(s) IV Push every 6 hours PRN  propofol Infusion 10 MICROgram(s)/kG/Min IV Continuous <Continuous>      apixaban 5 milliGRAM(s) Oral every 12 hours  aspirin  chewable 81 milliGRAM(s) Oral daily    pantoprazole  Injectable 40 milliGRAM(s) IV Push daily      atorvastatin 40 milliGRAM(s) Oral at bedtime  dexAMETHasone  Injectable 6 milliGRAM(s) IV Push daily        chlorhexidine 0.12% Liquid 15 milliLiter(s) Oral Mucosa every 12 hours        Mode: AC/ CMV (Assist Control/ Continuous Mandatory Ventilation)  RR (machine): 24  TV (machine): 375  FiO2: 45  PEEP: 10  ITime: 1  MAP: 10  PIP: 19      ICU Vital Signs Last 24 Hrs  T(C): 36.8 (23 Mar 2021 20:00), Max: 38.4 (23 Mar 2021 12:00)  T(F): 98.2 (23 Mar 2021 20:00), Max: 101.2 (23 Mar 2021 12:00)  HR: 87 (24 Mar 2021 00:00) (82 - 125)  BP: 94/46 (24 Mar 2021 00:00) (94/46 - 160/73)  BP(mean): 57 (24 Mar 2021 00:00) (57 - 102)  ABP: --  ABP(mean): --  RR: 33 (24 Mar 2021 00:00) (31 - 41)  SpO2: 94% (24 Mar 2021 00:00) (87% - 96%)      ABG - ( 23 Mar 2021 05:59 )  pH, Arterial: 7.46  pH, Blood: x     /  pCO2: 36    /  pO2: 81    / HCO3: 25    / Base Excess: 1.7   /  SaO2: 95          I&O's Summary    22 Mar 2021 07:01  -  23 Mar 2021 07:00  --------------------------------------------------------  IN: 1291 mL / OUT: 875 mL / NET: 416 mL    23 Mar 2021 07:01  -  24 Mar 2021 00:51  --------------------------------------------------------  IN: 1569.6 mL / OUT: 300 mL / NET: 1269.6 mL      LABS:                        12.3   32.52 )-----------( 594      ( 23 Mar 2021 10:20 )             37.6     03-23    146<H>  |  117<H>  |  55<H>  ----------------------------<  118<H>  4.3   |  25  |  1.01    Ca    8.9      23 Mar 2021 04:46  Phos  2.9     03-23  Mg     3.0     03-23    TPro  7.0  /  Alb  2.0<L>  /  TBili  0.6  /  DBili  0.1  /  AST  142<H>  /  ALT  94<H>  /  AlkPhos  142<H>  03-23      CARDIAC MARKERS ( 23 Mar 2021 10:20 )  x     / x     / 1222 U/L / x     / x          CAPILLARY BLOOD GLUCOSE        PT/INR - ( 23 Mar 2021 10:20 )   PT: 17.6 sec;   INR: 1.54 ratio             CULTURES:  Culture Results:   No Growth Final (03-17 @ 13:27)  Culture Results:   No Growth Final (03-17 @ 12:38)  Culture Results:   No growth (03-17 @ 04:15)      Physical Examination:    General: sedated and intubated, + gag    HEENT: Pupils equal, reactive to light.  Symmetric, sclera anicteric    PULM: Course BS bilaterally, no wheezing, no significant sputum production    CVS: irregular rate and rhythm    ABD: Soft, nondistended, normoactive bowel sounds    EXT: No edema, RLE without demarcation at R ankle with signs of ischemia    SKIN: Warm and well perfused upper extremities/central and LLE    RADIOLOGY:   EXAM:  CT BRAIN                            PROCEDURE DATE:  03/21/2021          INTERPRETATION:  CLINICAL HISTORY:  Stroke. On Eliquis. Obtunded. Evaluate for intracranial hemorrhage.    TECHNIQUE:  CT of the head without contrast.  Contiguous transaxial images of the head were acquired from the skull base to the vertex without the administration of iodinated contrast. Coronal and sagittal reformatted images are provided.    COMPARISON: CT abdomen 3/21/2021 at 12:02 PM    FINDINGS:    Evolutionary changes are seen within the multifocal infarcts within both frontal and left parietal lobes including increasing low attenuation and mass effect from edema. There is no significant hemorrhagic conversion. Vague high attenuation the left frontal lobe infarct on series 2 images 21 and 22 appears to be related to beam hardening artifact on the reformatted images. New low-attenuation involves the head of the right caudate nucleus with stable appearance in the head of the left caudate nucleus. The posterior fossa is unremarkable. Stable mild chronic microvascular changes.    The ventricles, sulci and cisternal spaces are stable in size and configuration without evidence of hydrocephalus.  There is no midline shift or abnormal extra-axial fluid collection.    The calvarium is intact.  There are no osteoblastic or lytic calvarial or skull base lesions.  The paranasal sinuses and mastoid air cells are clear.    IMPRESSION:  Evolving infarcts in both frontal and left parietal lobes without significant hemorrhagic conversion. New infarct in the head of the right caudate nucleus. No midline shift or hydrocephalus.            PAMELA ARREDONDO MD; Attending Radiologist  This document has been electronically signed. Mar 21 2021 11:32PM    EXAM:  XR CHEST PORTABLE URGENT 1V                            PROCEDURE DATE:  03/21/2021          INTERPRETATION:  Portable chest radiograph    CLINICAL INFORMATION: Intubation.    TECHNIQUE:  Portable  AP view of the chest was obtained.    COMPARISON: 10/9/2014  available for review.    FINDINGS: ET tube tip above tracheal bifurcation.  NG tube tip beyond GE junction.    The lungs show mild bilateral  multifocal and diffuse ill-defined airspace consolidations. No pneumothorax.    The heart and mediastinum are within normal limits.    Visualized osseous structures are intact.        IMPRESSION:   ET tube tip above tracheal bifurcation.  NG tube tip beyond GE junction.  . Mild bilateral  multifocal and diffuse ill-defined airspace consolidations.              DIMAS HUMPHRIES MD; Attending Radiologist  This document has been electronically signed. Mar 22 2021  4:39PM    ******PRELIMINARY REPORT******    ******PRELIMINARY REPORT******          EXAM:  US DPLX LWR EXT ARTS LTD RT                            PROCEDURE DATE:  03/23/2021    ******PRELIMINARY REPORT******    ******PRELIMINARY REPORT******              INTERPRETATION:  [facility] RADIOLOGIST PRELIMINARY REPORT    PROCEDURE INFORMATION:  Exam: US Duplex Right Lower Extremity Arteries Or Arterial Bypass Grafts  Exam date and time: 3/23/2021 12:34 AM  Age: 80 years old  Clinical indication: Other: Cold RT le/foot and no pulses felt. ; Patient HX:  Stent RT le fa    TECHNIQUE:  Imaging protocol: Right Real-time duplex scan of the arteries or arterial  bypass grafts of the right lower extremity with 2-D gray scale, color Doppler  flow and spectral waveform analysis. Images documented and saved.    COMPARISON:  No relevant prior studies available.    FINDINGS:  Right common femoral artery:  At least mild irregular luminal narrowing.  Biphasic flow, nonspecific.  Peak systolic velocity is 65.9 cm/s.  Right superficial femoral artery:  No flow is identified within the right SFA,  through the stent graft, to the level of the popliteal arteries.  Right popliteal artery: No occlusion or significant stenosis. Normal waveform.  Right calf/foot arteries:  The right dorsalis pedis artery is not identified.    Soft tissues: No hematoma or collection.    IMPRESSION:  1.  Occlusion of the right SFA, through the stent graft, to level of the  popliteal arteries.  2. At least mild, luminal, irregular narrowing of the CFA.    EXAM:  US DPLX CAROTIDS COMPL BI                            PROCEDURE DATE:  03/22/2021          INTERPRETATION:  CLINICAL INFORMATION: Multiple CVAs.    COMPARISON: MRA neck 3/18/2021    TECHNIQUE: Grayscale, color and spectral Doppler examination of both carotid arteries was performed. The examination was technically limited secondary to difficulty with patient positioning.    FINDINGS:    There is extensive plaque in the right carotid bulb. Elevated peak systolic velocities in the right carotid bulb suggestive of a greater than 70% stenosis; measuring up to 404 cm/s.    Mild to moderate plaque in the left carotid bulb. Peak systolic velocities in the area of plaque are within normal limits. Elevated peak systolic velocity in the mid left internal carotid artery of 138 cm/s which is likely technical due to tortuosity.    Mild plaque in the left common carotid artery.    Right vertebral artery not seen. Antegrade flow in left vertebral artery.    IMPRESSION:    Technically limited study.    Extensive plaque in the right carotid bulb with associated elevated peak systolic velocity suggestive of a greater than 70% stenosis.    Mild to moderate plaque in the left carotid bulb with less than 50% left internal carotid artery stenosis.    Right vertebral artery not seen.    Given the technical limitations of this study, a CTA is recommended for further evaluation.    The findings were discussed with Dr. Lobato at 4:31 PM on 3/22/2021.    Measurement of carotid stenosis is based on velocity parameters that correlate the residual internal carotid diameter with that of the more distal vessel in accordance with a method such as the [employer] Symptomatic Carotid Endarterectomy Trial (NASCET).                SCOTT ANGULO MD; Attending Radiologist  This document has been electronically signed. Mar 22 2021  4:32PM    EXAM:  US DPLX LWR EXT VEINS COMPL BI                            PROCEDURE DATE:  03/22/2021          INTERPRETATION:  CLINICAL INFORMATION: COVID. Stroke.    COMPARISON: None available.    TECHNIQUE: Duplex sonography of the BILATERAL LOWER extremity veins with color and spectral Doppler, with and without compression.    FINDINGS:    RIGHT:  Normal compressibility of the RIGHT common femoral, femoral and popliteal veins.  Doppler examination shows normal spontaneous and phasic flow.  No RIGHT calf vein thrombosis is detected.    LEFT:  Normal compressibility of the LEFT common femoral, femoral and popliteal veins.  Doppler examination shows normal spontaneous and phasic flow.  No LEFT calf vein thrombosis is detected.    IMPRESSION:  No evidence of deep venous thrombosis in either lower extremity.                SCOTT ANGULO MD; Attending Radiologist  This document has been electronically signed. Mar 22 2021  2:24PM    EXAM:  ECHO TTE WO CON COMP W DOPP         PROCEDURE DATE:  03/20/2021        INTERPRETATION:  Transthoracic Echocardiography Report (TTE)     Demographics     Patient name         AILIN BORJAS V  Age           80 year(s)     Med Rec #            [id number]          Gender        Male     Account #            [id number]       Date of Birth 1940     Interpreting         Richard Issa MD  Room Number   0308   Physician     Referring Physician  KALYN OTOOLE MD    Sonographer   Radha Maxwell,                                                         [facility]     Date of study        03/20/2021 12:00                        PM     Height               62.01 in           Weight        130.96 pounds    Type of Study:     TTE procedure: ECHO TTE WO CON COMP W DOP     BP: 170/90 mmHg     Study Location: [address] [place], [place] [address]ical Quality: Technically Difficullt    Indications   1) R07.9 - Chest pain    M-Mode Measurements (cm)     LVEDd: 5.28 cm            LVESd: 4.4 cm   IVSEd: 0.97 cm   LVPWd: 0.92 cm            AO Root Dimension: 3.3 cm                             ACS: 1.9 cm                             LA: 3.8 cm    Doppler Measurements:     AV Velocity:109 cm/s                MV Peak E-Wave: 43.9 cm/s   AV Peak Gradient: 4.75 mmHg         MV Peak A-Wave: 77.5 cm/s                                       MV E/A Ratio: 0.57 %                                       MV Peak Gradient: 0.77 mmHg     Findings     Mitral Valve   Mild mitral annular calcification is present.   Fibrocalcific changes noted to the mitral valve leaflets with preserved   leaflet excursion.   Mild to moderate mitral regurgitation.     Aortic Valve   Fibrocalcific changes noted to the Aortic valve leaflets with preserved   leaflet excursion.     Tricuspid Valve   The tricuspid valve leaflets are thin and pliable; valve motion is normal.   Trace tricuspid valve regurgitation.     Pulmonic Valve   Pulmonic valve not well seen.     Left Atrium   Normal appearing left atrium.     Left Ventricle   Endocardium is not well visualized, however, overall left ventricular   systolic function appears moderately decreased. The septum appears   severely hypokinetic. Estimated left ventricular ejection fraction is 35%.     Right Atrium   The right atrium appears dilated.     Right Ventricle   The right ventricle is normal in size.     Pericardial Effusion   No evidence of pericardial effusion.     Pleural Effusion   No evidence of pleural effusion.     Miscellaneous   The IVC appears normal.     Impression     Summary     Endocardium is not well visualized, however, overall left ventricular   systolic function appears moderately decreased. The septum appears   severely hypokinetic. Estimated left ventricular ejection fraction is 35%.   Mild to moderate mitral regurgitation.     Signature     ----------------------------------------------------------------   Electronically signed by Richard Issa MD(Interpreting   physician) on 03/20/2021 02:57 PM    CRITICAL CARE TIME SPENT: 35 mins assessing presenting problems of acute illness that poses high probability of life threatening deterioration or end organ damage/dysfunction.  Medical decision making including Initiating plan of care, reviewing data, reviewing radiology, direct patient bedside evaluation and interpretation of vital signs, any necessary ventilator management , discussion with multidisciplinary team, all non inclusive of procedures

## 2021-03-24 NOTE — PROGRESS NOTE ADULT - SUBJECTIVE AND OBJECTIVE BOX
H&P: 79 y/o male with a PMHx of hepatitis C, liver dysfunction, chronic CHF, CKD stage III who presents to the ED c/o SOB.  Pt tested COVID + on 3/7/21.  As per PMD, patient's girlfriend also has COVID.  PMD reports pt is a poor historian at baseline.  Pt's O2 at home in 80s prompting him to come to ED.  In the ER, sats 89% on RA.  Patient started on 5L NC and sats improved to upper 90's.  WBC elevated @ 13.  DDimer high 2434.  CTA negative for PE but positive for bilateral ground glass opacities c/w COVID-19.  Patient received Decadron 6 mg IV x1 in the ER and admitted.      3/22 Patient is a 80y old  Male who presents with a chief complaint of dyspnea ans right sided weakness on admission but did not recieve TPA due to being outside the therapeutic window. 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 Patient continue to have worsening mental status throughout this hospitalization and was transferred to ICU 3/21 when patient was  found to be obtunded and was intubated. Patient seen and evaluated at bedside. Patient is sedated on propofol.     3/23 Patient seen at bedside today. Patient still sedated on vent.        Notable Hx  He has an allergy to contrast as per his brother. He had respiratory arrest and kidney failure in the past after receiving contrast.   He also has a history of heparin induced thrombocytopenia.  He had angioplasty in his left leg and a stent in his right leg.      PAST MEDICAL & SURGICAL HISTORY:  Liver dysfunction    CKD (chronic kidney disease)    CHF (congestive heart failure)    Hepatitis C        Review of Systems:  Could not obtain    MEDICATIONS  (STANDING):  apixaban 5 milliGRAM(s) Oral every 12 hours  aspirin  chewable 81 milliGRAM(s) Oral daily  atorvastatin 40 milliGRAM(s) Oral at bedtime  budesonide 160 MICROgram(s)/formoterol 4.5 MICROgram(s) Inhaler 2 Puff(s) Inhalation two times a day  carvedilol 12.5 milliGRAM(s) Oral every 12 hours  cefepime   IVPB 2000 milliGRAM(s) IV Intermittent every 12 hours  chlorhexidine 0.12% Liquid 15 milliLiter(s) Oral Mucosa every 12 hours  dexAMETHasone  Injectable 6 milliGRAM(s) IV Push daily  dexMEDEtomidine Infusion 0.7 MICROgram(s)/kG/Hr (10.4 mL/Hr) IV Continuous <Continuous>  pantoprazole  Injectable 40 milliGRAM(s) IV Push daily  propofol Infusion 10 MICROgram(s)/kG/Min (3.56 mL/Hr) IV Continuous <Continuous>  remdesivir  IVPB 100 milliGRAM(s) IV Intermittent every 24 hours    MEDICATIONS  (PRN):  acetaminophen   Tablet .. 650 milliGRAM(s) Oral every 4 hours PRN Temp greater or equal to 38.5C (101.3F)  ALBUTerol    90 MICROgram(s) HFA Inhaler 2 Puff(s) Inhalation every 4 hours PRN Shortness of Breath and/or Wheezing  ondansetron Injectable 4 milliGRAM(s) IV Push every 6 hours PRN Nausea and/or Vomiting    ICU Vital Signs Last 24 Hrs  T(C): 37.8 (24 Mar 2021 06:00), Max: 38.4 (23 Mar 2021 12:00)  T(F): 100.1 (24 Mar 2021 06:00), Max: 101.2 (23 Mar 2021 12:00)  HR: 100 (24 Mar 2021 09:00) (87 - 125)  BP: 114/67 (24 Mar 2021 09:00) (94/46 - 160/73)  BP(mean): 76 (24 Mar 2021 09:00) (57 - 102)  RR: 35 (24 Mar 2021 09:00) (32 - 38)  SpO2: 95% (24 Mar 2021 09:00) (93% - 100%)      Blood Gas Profile - Arterial (03.23.21 @ 05:59)   pH, Arterial: 7.46   pCO2, Arterial: 36 mmHg   pO2, Arterial: 81 mmHg   HCO3, Arterial: 25 mmoL/L   Base Excess, Arterial: 1.7 mmol/L   Oxygen Saturation, Arterial: 95 %   Blood Gas Comments Arterial: FIO2:_ MODE:_ VT:_ RATE:_ PEEP:_ Comment:_24/375/60%/10   Blood Gas Source Arterial: Arterial     `I&O's Detail    23 Mar 2021 07:01  -  24 Mar 2021 07:00  --------------------------------------------------------  IN:    Dexmedetomidine: 34 mL    Enteral Tube Flush: 1050 mL    IV PiggyBack: 320 mL    Jevity 1.5: 876 mL    Propofol: 357.6 mL  Total IN: 2637.6 mL    OUT:    Voided (mL): 650 mL  Total OUT: 650 mL    Total NET: 1987.6 mL      LABS:                        12.1   24.66 )-----------( 557      ( 22 Mar 2021 07:24 )             37.7     03-22    147<H>  |  114<H>  |  53<H>  ----------------------------<  115<H>  4.1   |  26  |  1.09    Ca    8.7      22 Mar 2021 07:24  Phos  4.2     03-22  Mg     2.7     03-22    TPro  6.9  /  Alb  2.1<L>  /  TBili  0.7  /  DBili  x   /  AST  67<H>  /  ALT  68  /  AlkPhos  132<H>  03-22          CAPILLARY BLOOD GLUCOSE      POCT Blood Glucose.: 159 mg/dL (21 Mar 2021 20:09)    PT/INR - ( 22 Mar 2021 07:24 )   PT: 20.1 sec;   INR: 1.78 ratio             CULTURES:  Culture Results:   No growth to date. (03-17 @ 13:27)  Culture Results:   No growth to date. (03-17 @ 12:38)  Culture Results:   No growth (03-17 @ 04:15)      Physical Examination:    General: Obtunded. Cachetic     HEENT: Pupils equal, reactive to light.  Symmetric.    PULM: Clear to auscultation bilaterally, no significant sputum production    CVS: Regular rate and rhythm, no murmurs, rubs, or gallops    ABD: Soft, nondistended, nontender, normoactive bowel sounds, liver edge palpated 2cm below costal ridge    EXT: No edema, nontender    SKIN: Warm and well perfused. multiple bruising and increased skin turgor seen b/l foreaems        CRITICAL CARE TIME SPENT: *** H&P: 81 y/o male with a PMHx of hepatitis C, liver dysfunction, chronic CHF, CKD stage III who presents to the ED c/o SOB.  Pt tested COVID + on 3/7/21.  As per PMD, patient's girlfriend also has COVID.  PMD reports pt is a poor historian at baseline.  Pt's O2 at home in 80s prompting him to come to ED.  In the ER, sats 89% on RA.  Patient started on 5L NC and sats improved to upper 90's.  WBC elevated @ 13.  DDimer high 2434.  CTA negative for PE but positive for bilateral ground glass opacities c/w COVID-19.  Patient received Decadron 6 mg IV x1 in the ER and admitted.      3/22 Patient is a 80y old  Male who presents with a chief complaint of dyspnea ans right sided weakness on admission but did not recieve TPA due to being outside the therapeutic window. 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 Patient continue to have worsening mental status throughout this hospitalization and was transferred to ICU 3/21 when patient was  found to be obtunded and was intubated. Patient seen and evaluated at bedside. Patient is sedated on propofol.     3/23 Patient seen at bedside today. Patient still sedated on vent.        Notable Hx  He has an allergy to contrast as per his brother. He had respiratory arrest and kidney failure in the past after receiving contrast.   He also has a history of heparin induced thrombocytopenia.  He had angioplasty in his left leg and a stent in his right leg.      PAST MEDICAL & SURGICAL HISTORY:  Liver dysfunction    CKD (chronic kidney disease)    CHF (congestive heart failure)    Hepatitis C        Review of Systems:  Could not obtain    MEDICATIONS  (STANDING):  apixaban 5 milliGRAM(s) Oral every 12 hours  aspirin  chewable 81 milliGRAM(s) Oral daily  atorvastatin 40 milliGRAM(s) Oral at bedtime  budesonide 160 MICROgram(s)/formoterol 4.5 MICROgram(s) Inhaler 2 Puff(s) Inhalation two times a day  carvedilol 12.5 milliGRAM(s) Oral every 12 hours  cefepime   IVPB 2000 milliGRAM(s) IV Intermittent every 12 hours  chlorhexidine 0.12% Liquid 15 milliLiter(s) Oral Mucosa every 12 hours  dexAMETHasone  Injectable 6 milliGRAM(s) IV Push daily  dexMEDEtomidine Infusion 0.7 MICROgram(s)/kG/Hr (10.4 mL/Hr) IV Continuous <Continuous>  pantoprazole  Injectable 40 milliGRAM(s) IV Push daily  propofol Infusion 10 MICROgram(s)/kG/Min (3.56 mL/Hr) IV Continuous <Continuous>  remdesivir  IVPB 100 milliGRAM(s) IV Intermittent every 24 hours    MEDICATIONS  (PRN):  acetaminophen   Tablet .. 650 milliGRAM(s) Oral every 4 hours PRN Temp greater or equal to 38.5C (101.3F)  ALBUTerol    90 MICROgram(s) HFA Inhaler 2 Puff(s) Inhalation every 4 hours PRN Shortness of Breath and/or Wheezing  ondansetron Injectable 4 milliGRAM(s) IV Push every 6 hours PRN Nausea and/or Vomiting    ICU Vital Signs Last 24 Hrs  T(C): 37.8 (24 Mar 2021 06:00), Max: 38.4 (23 Mar 2021 12:00)  T(F): 100.1 (24 Mar 2021 06:00), Max: 101.2 (23 Mar 2021 12:00)  HR: 100 (24 Mar 2021 09:00) (87 - 125)  BP: 114/67 (24 Mar 2021 09:00) (94/46 - 160/73)  BP(mean): 76 (24 Mar 2021 09:00) (57 - 102)  RR: 35 (24 Mar 2021 09:00) (32 - 38)  SpO2: 95% (24 Mar 2021 09:00) (93% - 100%)      Blood Gas Profile - Arterial (03.23.21 @ 05:59)   pH, Arterial: 7.46   pCO2, Arterial: 36 mmHg   pO2, Arterial: 81 mmHg   HCO3, Arterial: 25 mmoL/L   Base Excess, Arterial: 1.7 mmol/L   Oxygen Saturation, Arterial: 95 %   Blood Gas Comments Arterial: FIO2:_ MODE:_ VT:_ RATE:_ PEEP:_ Comment:_24/375/60%/10   Blood Gas Source Arterial: Arterial     `I&O's Detail    23 Mar 2021 07:01  -  24 Mar 2021 07:00  --------------------------------------------------------  IN:    Dexmedetomidine: 34 mL    Enteral Tube Flush: 1050 mL    IV PiggyBack: 320 mL    Jevity 1.5: 876 mL    Propofol: 357.6 mL  Total IN: 2637.6 mL    OUT:    Voided (mL): 650 mL  Total OUT: 650 mL    Total NET: 1987.6 mL      LABS:                                     12.7   27.04 )-----------( 557      ( 24 Mar 2021 06:54 )             40.3     03-24    146<H>  |  114<H>  |  50<H>  ----------------------------<  111<H>  4.7   |  28  |  1.00    Ca    8.4<L>      24 Mar 2021 06:54  Phos  2.6     03-24  Mg     2.6     03-24    TPro  6.6  /  Alb  2.1<L>  /  TBili  0.5  /  DBili  0.2  /  AST  134<H>  /  ALT  110<H>  /  AlkPhos  151<H>  03-24           CULTURES:  Culture Results:   No growth to date. (03-17 @ 13:27)  Culture Results:   No growth to date. (03-17 @ 12:38)  Culture Results:   No growth (03-17 @ 04:15)      Physical Examination:    General: Obtunded. Cachetic     HEENT: Pupils equal, reactive to light.  Symmetric.    PULM: Clear to auscultation bilaterally, no significant sputum production    CVS: Regular rate and rhythm, no murmurs, rubs, or gallops    ABD: Soft, nondistended, nontender, normoactive bowel sounds, liver edge palpated 2cm below costal ridge    EXT: RLE  below knee feels cold compared to Left lower extremity. Barely palpable Right popliteal pulse. Dorsalis pedis pulse. LEft dorsalis pedis pulse +1    SKIN: Dusky right lower extremity. multiple bruising and increased skin turgor seen b/l foreaems        CRITICAL CARE TIME SPENT: **

## 2021-03-24 NOTE — PROGRESS NOTE ADULT - SUBJECTIVE AND OBJECTIVE BOX
Patient is a 80y old  Male with COVID pneumonia, intubated and sedated. Vitals WNL.          Vital Signs Last 24 Hrs  T(C): 37.8 (24 Mar 2021 06:00), Max: 38.4 (23 Mar 2021 12:00)  T(F): 100.1 (24 Mar 2021 06:00), Max: 101.2 (23 Mar 2021 12:00)  HR: 110 (24 Mar 2021 07:00) (87 - 125)  BP: 146/70 (24 Mar 2021 07:00) (94/46 - 160/73)  BP(mean): 87 (24 Mar 2021 07:00) (57 - 102)  RR: 36 (24 Mar 2021 07:00) (32 - 38)  SpO2: 95% (24 Mar 2021 07:00) (93% - 100%)    Labs:    ABG - ( 23 Mar 2021 05:59 )  pH, Arterial: 7.46  pH, Blood: x     /  pCO2: 36    /  pO2: 81    / HCO3: 25    / Base Excess: 1.7   /  SaO2: 95                CARDIAC MARKERS ( 24 Mar 2021 06:54 )  x     / x     / 1237 U/L / x     / x      CARDIAC MARKERS ( 23 Mar 2021 10:20 )  x     / x     / 1222 U/L / x     / x                                12.7   27.04 )-----------( 557      ( 24 Mar 2021 06:54 )             40.3     CBC Full  -  ( 24 Mar 2021 06:54 )  WBC Count : 27.04 K/uL  RBC Count : 4.13 M/uL  Hemoglobin : 12.7 g/dL  Hematocrit : 40.3 %  Platelet Count - Automated : 557 K/uL  Mean Cell Volume : 97.6 fl  Mean Cell Hemoglobin : 30.8 pg  Mean Cell Hemoglobin Concentration : 31.5 gm/dL  Auto Neutrophil # : x  Auto Lymphocyte # : x  Auto Monocyte # : x  Auto Eosinophil # : x  Auto Basophil # : x  Auto Neutrophil % : x  Auto Lymphocyte % : x  Auto Monocyte % : x  Auto Eosinophil % : x  Auto Basophil % : x    03-24    146<H>  |  114<H>  |  50<H>  ----------------------------<  111<H>  4.7   |  28  |  1.00    Ca    8.4<L>      24 Mar 2021 06:54  Phos  2.6     03-24  Mg     2.6     03-24    TPro  6.6  /  Alb  2.1<L>  /  TBili  0.5  /  DBili  0.2  /  AST  134<H>  /  ALT  110<H>  /  AlkPhos  151<H>  03-24    LIVER FUNCTIONS - ( 24 Mar 2021 06:54 )  Alb: 2.1 g/dL / Pro: 6.6 gm/dL / ALK PHOS: 151 U/L / ALT: 110 U/L / AST: 134 U/L / GGT: x           PT/INR - ( 24 Mar 2021 06:54 )   PT: 18.3 sec;   INR: 1.61 ratio               Meds:  acetaminophen   Tablet .. 650 milliGRAM(s) Oral every 4 hours PRN  ALBUTerol    90 MICROgram(s) HFA Inhaler 2 Puff(s) Inhalation every 4 hours PRN  apixaban 5 milliGRAM(s) Oral every 12 hours  aspirin  chewable 81 milliGRAM(s) Oral daily  atorvastatin 40 milliGRAM(s) Oral at bedtime  budesonide 160 MICROgram(s)/formoterol 4.5 MICROgram(s) Inhaler 2 Puff(s) Inhalation two times a day  carvedilol 12.5 milliGRAM(s) Oral every 12 hours  cefepime   IVPB 2000 milliGRAM(s) IV Intermittent every 12 hours  chlorhexidine 0.12% Liquid 15 milliLiter(s) Oral Mucosa every 12 hours  dexAMETHasone  Injectable 6 milliGRAM(s) IV Push daily  dexMEDEtomidine Infusion 0.7 MICROgram(s)/kG/Hr IV Continuous <Continuous>  ondansetron Injectable 4 milliGRAM(s) IV Push every 6 hours PRN  pantoprazole  Injectable 40 milliGRAM(s) IV Push daily  propofol Infusion 10 MICROgram(s)/kG/Min IV Continuous <Continuous>  remdesivir  IVPB 100 milliGRAM(s) IV Intermittent every 24 hours            Physical Examination:    General: sedated and intubated  PULM: Course BS bilaterally, no wheezing, no significant sputum production  CVS: irregular rate and rhythm  ABD: Soft, nondistended, normoactive bowel sounds  EXT: No edema, RLE with mottling and demarcation at mid right leg, palpable R femoral pulse, No dopplerable/palpable signals of right PT/DP/Pop.   SKIN: Warm and well perfused upper extremities/central and LLE

## 2021-03-24 NOTE — PROGRESS NOTE ADULT - SUBJECTIVE AND OBJECTIVE BOX
Date of service: 03-24-21 @ 10:33    Intubated on ventilatory support  Lethargic  Febrile     ROS: unobtainable    MEDICATIONS  (STANDING):  apixaban 5 milliGRAM(s) Oral every 12 hours  aspirin  chewable 81 milliGRAM(s) Oral daily  budesonide 160 MICROgram(s)/formoterol 4.5 MICROgram(s) Inhaler 2 Puff(s) Inhalation two times a day  carvedilol 12.5 milliGRAM(s) Oral every 12 hours  cefepime   IVPB 2000 milliGRAM(s) IV Intermittent every 12 hours  chlorhexidine 0.12% Liquid 15 milliLiter(s) Oral Mucosa every 12 hours  dexAMETHasone  Injectable 6 milliGRAM(s) IV Push daily  dexMEDEtomidine Infusion 0.7 MICROgram(s)/kG/Hr (10.4 mL/Hr) IV Continuous <Continuous>  pantoprazole  Injectable 40 milliGRAM(s) IV Push daily  polyethylene glycol 3350 17 Gram(s) Oral daily  propofol Infusion 10 MICROgram(s)/kG/Min (3.56 mL/Hr) IV Continuous <Continuous>  remdesivir  IVPB 100 milliGRAM(s) IV Intermittent every 24 hours    Vital Signs Last 24 Hrs  T(C): 37.8 (24 Mar 2021 06:00), Max: 38.4 (23 Mar 2021 12:00)  T(F): 100.1 (24 Mar 2021 06:00), Max: 101.2 (23 Mar 2021 12:00)  HR: 94 (24 Mar 2021 09:16) (87 - 125)  BP: 114/67 (24 Mar 2021 09:00) (94/46 - 160/73)  BP(mean): 76 (24 Mar 2021 09:00) (57 - 102)  RR: 35 (24 Mar 2021 09:00) (32 - 38)  SpO2: 96% (24 Mar 2021 09:16) (93% - 100%)  Mode: AC/ CMV (Assist Control/ Continuous Mandatory Ventilation), RR (machine): 24, TV (machine): 375, FiO2: 55, PEEP: 10, ITime: 1, MAP: 10, PIP: 21   Physical exam:    Constitutional:  No acute distress  HEENT: NC/AT, EOMI, PERRLA, conjunctivae clear  Neck: supple; thyroid not palpable  Back: no tenderness  Respiratory: respiratory effort normal; crackles at bases; few rhonchi  Cardiovascular: S1S2 regular, no murmurs  Abdomen: soft, not tender, not distended, positive BS  Genitourinary: no suprapubic tenderness  Lymphatic: no LN palpable  Musculoskeletal: no muscle tenderness, no joint swelling or tenderness  Extremities: no pedal edema  right foot dark, erythema discoloration; tender  Neurological/ Psychiatric: confused; moving all extremities  Skin: no rashes; no palpable lesions    Labs: reviewed                        12.7   27.04 )-----------( 557      ( 24 Mar 2021 06:54 )             40.3     03-24    146<H>  |  114<H>  |  50<H>  ----------------------------<  111<H>  4.7   |  28  |  1.00    Ca    8.4<L>      24 Mar 2021 06:54  Phos  2.6     03-24  Mg     2.6     03-24    TPro  6.6  /  Alb  2.1<L>  /  TBili  0.5  /  DBili  0.2  /  AST  134<H>  /  ALT  110<H>  /  AlkPhos  151<H>  03-24    D-Dimer Assay, Quantitative: 82259 ng/mL DDU (03-22-21 @ 07:24)  C-Reactive Protein, Serum: 82 mg/L (03-22-21 @ 07:24)  C-Reactive Protein, Serum: 78 mg/L (03-22-21 @ 05:00)  D-Dimer Assay, Quantitative: 3122 ng/mL DDU (03-20-21 @ 07:08)  Ferritin, Serum: 624 ng/mL (03-20-21 @ 07:08)  C-Reactive Protein, Serum: 35 mg/L (03-20-21 @ 07:08)  D-Dimer Assay, Quantitative: 2000 ng/mL DDU (03-18-21 @ 17:14)  C-Reactive Protein, Serum: 273 mg/L (03-17-21 @ 12:38)  D-Dimer Assay, Quantitative: 2434 ng/mL DDU (03-17-21 @ 12:38)  Ferritin, Serum: 475 ng/mL (03-17-21 @ 12:38)                                  12.1   24.66 )-----------( 557      ( 22 Mar 2021 07:24 )             37.7     03-22    147<H>  |  114<H>  |  53<H>  ----------------------------<  115<H>  4.1   |  26  |  1.09    Ca    8.7      22 Mar 2021 07:24  Phos  4.2     03-22  Mg     2.7     03-22    TPro  6.9  /  Alb  2.1<L>  /  TBili  0.7  /  DBili  x   /  AST  67<H>  /  ALT  68  /  AlkPhos  132<H>  03-22    D-Dimer Assay, Quantitative: 95112 ng/mL DDU (03-22-21 @ 07:24)  C-Reactive Protein, Serum: 82 mg/L (03-22-21 @ 07:24)  C-Reactive Protein, Serum: 78 mg/L (03-22-21 @ 05:00)  D-Dimer Assay, Quantitative: 3122 ng/mL DDU (03-20-21 @ 07:08)  Ferritin, Serum: 624 ng/mL (03-20-21 @ 07:08)  C-Reactive Protein, Serum: 35 mg/L (03-20-21 @ 07:08)  D-Dimer Assay, Quantitative: 2000 ng/mL DDU (03-18-21 @ 17:14)  C-Reactive Protein, Serum: 273 mg/L (03-17-21 @ 12:38)  D-Dimer Assay, Quantitative: 2434 ng/mL DDU (03-17-21 @ 12:38)  Ferritin, Serum: 475 ng/mL (03-17-21 @ 12:38)                        11.5   12.36 )-----------( 486      ( 18 Mar 2021 09:04 )             34.2     03-19    x   |  x   |  x   ----------------------------<  x   x    |  x   |  0.91    Ca    9.4      18 Mar 2021 09:04    TPro  7.3  /  Alb  2.1<L>  /  TBili  0.4  /  DBili  0.2  /  AST  54<H>  /  ALT  59  /  AlkPhos  121<H>  03-19      D-Dimer Assay, Quantitative: 2000 ng/mL DDU (03-18-21 @ 17:14)  C-Reactive Protein, Serum: 273 mg/L (03-17-21 @ 12:38)  D-Dimer Assay, Quantitative: 2434 ng/mL DDU (03-17-21 @ 12:38)  Ferritin, Serum: 475 ng/mL (03-17-21 @ 12:38)      Culture - Blood (collected 17 Mar 2021 13:27)  Source: .Blood None  Preliminary Report (18 Mar 2021 19:02):    No growth to date.    Culture - Blood (collected 17 Mar 2021 12:38)  Source: .Blood Blood-Peripheral  Preliminary Report (18 Mar 2021 17:02):    No growth to date.    Culture - Urine (collected 17 Mar 2021 04:15)  Source: .Urine Clean Catch (Midstream)  Final Report (19 Mar 2021 09:57):    No growth    COVID-19 PCR: Detected (03-17-21 @ 14:45)    Radiology: all available radiological tests reviewed    < from: CT Angio Chest PE Protocol w/ IV Cont (03.17.21 @ 14:20) >  Bilateral infiltrates consistent with COVID 19 pneumonia.  No evidence of pulmonary embolus.  < end of copied text >      Advanced directives addressed: full resuscitation

## 2021-03-24 NOTE — PROGRESS NOTE ADULT - ASSESSMENT
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 Lopez

## 2021-03-25 NOTE — PROGRESS NOTE ADULT - ASSESSMENT
IMP:    79 y/o male with COVID, hepatitis C, liver dysfunction, chronic HFrEF (35%) and CKD stage III found to have evolving B/L frontal and L parietal CVA and new ischemic R CN CVA resulting in unresponsiveness and acute type 1 resp failure-- Encephalopathy   Acute ischemic RLE--stent thrombosis--while on AC  Viral PNA sepsis secondary to COVID--Acute type 1 resp failure   AFib on DOAC    Critically ill.  High risk for acute decompensation and deterioration including death.  Very poor outlook   Patient requires critical care for support of one or more vital organ systems with a high probability of imminent or life threatening deterioration in his/her condition     Plan:    Full vent support--LPV strategy to maintain plateau pressures < 30--High PEEP/low TV--Permissive hypercapnea and acidemia.  Decrease FIO2.  PAP < 20  Cont with propofol gtt.  Can add lorazepam PRN  Cont with DOAC.  Pt not surgical candidate   Cont with Rem/Dexa (9). Cefepime (3) as the limb will become septic source  DC statin  Cont to trend LFT  TF   HOB > 30  DVT prophy--AC    ICU care--d/w ICU staff on multi disciplinary rounds and brother Xavier  updated him on pt's condition.  All concerns addressed including but not limited to diagnosis, treatment plan and overall prognosis

## 2021-03-25 NOTE — PROGRESS NOTE ADULT - ASSESSMENT
Impression:  1. acute hypoxemic respiratory failure  2. severe protein malnutrition  3. COVID-19 Viral pneumonia  4. transaminitis  5. acute CVA  6. acute ischemic RLE  7. rhabdomyolysis  8. chronic HFrEF  9. hyperosmolar hypernatremia    Plan:  Neuro - Sedation to facilitate safe ventilation, no significant MS reportedly with sedation vacations, acute multi-territory CVAs, cont ASA, statin d/cd due elevated LFTs    CV -  hemodynamics stable          EF 35% with septal HK, chronic          AC with full dose eliquis, hx Hep allergy          keep K~4 and Mg>2 for optimal arrhythmia suppression          cont coreg          RLE ischemic with SFA stent occlusion, not a surgical candidate, f/u vascular for possible amp needs pending overall course, full dose AC    Pulm -  full vent support with LTV 6-8cc/kg IDW, plts currently 23, keeping <30             actively titrating FiO2 for sats>88%, weaned from 55% to 50%             utilization of PEEP for alveolar recruitment             Vent bundle in place and reviewed     GI -  PPI, enteric feeds as tolerated as per RD recs, LFTs stable, monitor closely if cont to rise will need to d/c Remdesivir, statin discontinued yesterday    Renal - Cr stable,  Even to negative fluid balance as tolerated by hemodynamics and renal fx, increase free water flushes to 200ml [id number], trend BMP    Heme -  full dose AC with eliquis, no signs of active bleeding    ID - Empiric IV abx with Cefepime given RLE ischemia, Cx NG, cont Remdesivir and Decadron for full course, Abx adjustment/discontinuation based on discussion with ID in conjunction         with clinical features and culture data.      Endo -  Aggressive glycemic control to limit FS glucose to < 180mg/dl, BS stable   Impression:  1. acute hypoxemic respiratory failure  2. severe protein malnutrition  3. COVID-19 Viral pneumonia  4. transaminitis  5. acute CVA  6. acute ischemic RLE  7. rhabdomyolysis  8. chronic HFrEF  9. hyperosmolar hypernatremia    Plan:  Neuro - Sedation to facilitate safe ventilation, no significant MS reportedly with sedation vacations, acute multi-territory CVAs, cont ASA, statin d/cd due elevated LFTs    CV -  hemodynamics stable          EF 35% with septal HK, chronic          AC with full dose eliquis, hx Hep allergy          keep K~4 and Mg>2 for optimal arrhythmia suppression          cont coreg          RLE ischemic with SFA stent occlusion, not a surgical candidate, f/u vascular for possible amp needs pending overall course, full dose AC    Pulm -  full vent support with LTV 6-8cc/kg IDW, plts currently 23, keeping <30             actively titrating FiO2 for sats>88%, weaned from 55% to 50%, cont to wean 5% [id number] as tolerates             utilization of PEEP for alveolar recruitment, cont to maintain PEEP until weaned to 40%             Vent bundle in place and reviewed     GI -  PPI, enteric feeds as tolerated as per RD recs, LFTs stable, monitor closely if cont to rise will need to d/c Remdesivir, statin discontinued yesterday    Renal - Cr stable,  Even to negative fluid balance as tolerated by hemodynamics and renal fx, increase free water flushes to 200ml [id number], trend BMP    Heme -  full dose AC with eliquis, no signs of active bleeding    ID - Empiric IV abx with Cefepime given RLE ischemia, Cx NG, cont Remdesivir and Decadron for full course, Abx adjustment/discontinuation based on discussion with ID in conjunction         with clinical features and culture data.      Endo -  Aggressive glycemic control to limit FS glucose to < 180mg/dl, BS stable

## 2021-03-25 NOTE — PROGRESS NOTE ADULT - SUBJECTIVE AND OBJECTIVE BOX
Date of service: 03-25-21 @ 11:48    Lying in bed in NAD  Intubated on ventilatory support  Febrile to 101.5F  Lethargic    ROS: unobtainable    MEDICATIONS  (STANDING):  apixaban 5 milliGRAM(s) Oral every 12 hours  aspirin  chewable 81 milliGRAM(s) Oral daily  budesonide 160 MICROgram(s)/formoterol 4.5 MICROgram(s) Inhaler 2 Puff(s) Inhalation two times a day  carvedilol 12.5 milliGRAM(s) Oral every 12 hours  cefepime   IVPB 2000 milliGRAM(s) IV Intermittent every 12 hours  chlorhexidine 0.12% Liquid 15 milliLiter(s) Oral Mucosa every 12 hours  dexAMETHasone  Injectable 6 milliGRAM(s) IV Push daily  pantoprazole  Injectable 40 milliGRAM(s) IV Push daily  polyethylene glycol 3350 17 Gram(s) Oral daily  propofol Infusion 10 MICROgram(s)/kG/Min (3.56 mL/Hr) IV Continuous <Continuous>  remdesivir  IVPB 100 milliGRAM(s) IV Intermittent every 24 hours    Vital Signs Last 24 Hrs  T(C): 37.9 (25 Mar 2021 08:00), Max: 38.6 (25 Mar 2021 05:00)  T(F): 100.2 (25 Mar 2021 08:00), Max: 101.5 (25 Mar 2021 05:00)  HR: 104 (25 Mar 2021 09:00) (82 - 119)  BP: 127/74 (25 Mar 2021 09:00) (103/61 - 189/128)  BP(mean): 82 (25 Mar 2021 09:00) (67 - 142)  RR: 38 (25 Mar 2021 09:00) (25 - 40)  SpO2: 95% (25 Mar 2021 09:00) (92% - 100%)  Mode: AC/ CMV (Assist Control/ Continuous Mandatory Ventilation), RR (machine): 24, TV (machine): 375, FiO2: 50, PEEP: 10, ITime: 1, MAP: 11, PIP: 26   Physical exam:    Constitutional:  No acute distress  HEENT: NC/AT, EOMI, PERRLA, conjunctivae clear  Neck: supple; thyroid not palpable  Back: no tenderness  Respiratory: respiratory effort normal; crackles at bases; few rhonchi  Cardiovascular: S1S2 regular, no murmurs  Abdomen: soft, not tender, not distended, positive BS  Genitourinary: no suprapubic tenderness  Lymphatic: no LN palpable  Musculoskeletal: no muscle tenderness, no joint swelling or tenderness  Extremities: no pedal edema  right foot dark, erythema discoloration; tender  Neurological/ Psychiatric: confused; moving all extremities  Skin: no rashes; no palpable lesions    Labs: reviewed                        12.0   26.06 )-----------( 513      ( 25 Mar 2021 06:40 )             38.2     03-25    144  |  113<H>  |  58<H>  ----------------------------<  136<H>  4.5   |  25  |  1.05    Ca    8.0<L>      25 Mar 2021 06:40  Phos  2.7     03-25  Mg     2.4     03-25    TPro  6.1  /  Alb  1.8<L>  /  TBili  0.4  /  DBili  0.2  /  AST  118<H>  /  ALT  95<H>  /  AlkPhos  147<H>  03-25    D-Dimer Assay, Quantitative: 17251 ng/mL DDU (03-22-21 @ 07:24)  C-Reactive Protein, Serum: 82 mg/L (03-22-21 @ 07:24)  C-Reactive Protein, Serum: 78 mg/L (03-22-21 @ 05:00)  D-Dimer Assay, Quantitative: 3122 ng/mL DDU (03-20-21 @ 07:08)  Ferritin, Serum: 624 ng/mL (03-20-21 @ 07:08)  C-Reactive Protein, Serum: 35 mg/L (03-20-21 @ 07:08)  D-Dimer Assay, Quantitative: 2000 ng/mL DDU (03-18-21 @ 17:14)  C-Reactive Protein, Serum: 273 mg/L (03-17-21 @ 12:38)  D-Dimer Assay, Quantitative: 2434 ng/mL DDU (03-17-21 @ 12:38)  Ferritin, Serum: 475 ng/mL (03-17-21 @ 12:38)                                             12.1   24.66 )-----------( 557      ( 22 Mar 2021 07:24 )             37.7     03-22    147<H>  |  114<H>  |  53<H>  ----------------------------<  115<H>  4.1   |  26  |  1.09    Ca    8.7      22 Mar 2021 07:24  Phos  4.2     03-22  Mg     2.7     03-22    TPro  6.9  /  Alb  2.1<L>  /  TBili  0.7  /  DBili  x   /  AST  67<H>  /  ALT  68  /  AlkPhos  132<H>  03-22                        11.5   12.36 )-----------( 486      ( 18 Mar 2021 09:04 )             34.2     03-19    x   |  x   |  x   ----------------------------<  x   x    |  x   |  0.91    Ca    9.4      18 Mar 2021 09:04    TPro  7.3  /  Alb  2.1<L>  /  TBili  0.4  /  DBili  0.2  /  AST  54<H>  /  ALT  59  /  AlkPhos  121<H>  03-19      D-Dimer Assay, Quantitative: 2000 ng/mL DDU (03-18-21 @ 17:14)  C-Reactive Protein, Serum: 273 mg/L (03-17-21 @ 12:38)  D-Dimer Assay, Quantitative: 2434 ng/mL DDU (03-17-21 @ 12:38)  Ferritin, Serum: 475 ng/mL (03-17-21 @ 12:38)      Culture - Blood (collected 17 Mar 2021 13:27)  Source: .Blood None  Preliminary Report (18 Mar 2021 19:02):    No growth to date.    Culture - Blood (collected 17 Mar 2021 12:38)  Source: .Blood Blood-Peripheral  Preliminary Report (18 Mar 2021 17:02):    No growth to date.    Culture - Urine (collected 17 Mar 2021 04:15)  Source: .Urine Clean Catch (Midstream)  Final Report (19 Mar 2021 09:57):    No growth    COVID-19 PCR: Detected (03-17-21 @ 14:45)    Radiology: all available radiological tests reviewed    < from: CT Angio Chest PE Protocol w/ IV Cont (03.17.21 @ 14:20) >  Bilateral infiltrates consistent with COVID 19 pneumonia.  No evidence of pulmonary embolus.  < end of copied text >      Advanced directives addressed: full resuscitation

## 2021-03-25 NOTE — PROGRESS NOTE ADULT - SUBJECTIVE AND OBJECTIVE BOX
Hospital D # 8  ICU # 4  Vent # 4    CC:  Respiratory Failure     HPI:    81 y/o male with hepatitis C, liver dysfunction, chronic HFrEF (35%) and CKD stage III who presents to the ED c/o SOB.  Pt tested COVID + on 3/7/21.  As per PMD, patient's girlfriend also has COVID.  PMD reports pt is a poor historian at baseline.  Pt's O2 at home in 80s prompting him to come to ED.  In the ER, sats 89% on RA.  Patient started on 5L NC and sats improved to upper 90's.  WBC elevated @ 13.  DDimer high 2434.  CTA negative for PE but positive for bilateral ground glass opacities c/w COVID-19  Hospital course complicated by worsening mentation.  Brain MR revealed L watershed infarcts and PAFib started on AC    3/22:  Case d/w ALVARO Brar.  Pt seen and examined in ICU.  RRT O/N for obtundation.  Intubated.  HCT reveal Evolving prior B/L frontal and L CVA and new R CN CVA . Vented on 90/8.  Not responsive off sedation.  Poor [place]     3/23:  Case d/w ALVARO Brar.  Acute ischemic RLE.  Cool.  Leukocytosis.  Tm 100.9  Not awake off sedation. Doing very poorly    3/24:  Vented and sedated.  Dannie fever O/N.  CK 1200.  R ankle with delineation of ischemia.   No pressors yet    3/25:  Vented and unresponsive off sedation. CK stable.  No pressor.  Febrile.  Brother at bedside on 3/24--condition reviewed in detail    PMH:  As above.     PSH:  As above.     FH: Non Contributory other than those listed in HPI    Social History:  Unobtainable due to clinical condition     MEDICATIONS  (STANDING):  apixaban 5 milliGRAM(s) Oral every 12 hours  aspirin  chewable 81 milliGRAM(s) Oral daily  budesonide 160 MICROgram(s)/formoterol 4.5 MICROgram(s) Inhaler 2 Puff(s) Inhalation two times a day  carvedilol 12.5 milliGRAM(s) Oral every 12 hours  cefepime   IVPB 2000 milliGRAM(s) IV Intermittent every 12 hours  chlorhexidine 0.12% Liquid 15 milliLiter(s) Oral Mucosa every 12 hours  dexAMETHasone  Injectable 6 milliGRAM(s) IV Push daily  pantoprazole  Injectable 40 milliGRAM(s) IV Push daily  polyethylene glycol 3350 17 Gram(s) Oral daily  propofol Infusion 10 MICROgram(s)/kG/Min (3.56 mL/Hr) IV Continuous <Continuous>  remdesivir  IVPB 100 milliGRAM(s) IV Intermittent every 24 hours    MEDICATIONS  (PRN):  acetaminophen   Tablet .. 650 milliGRAM(s) Oral every 4 hours PRN Temp greater or equal to 38.5C (101.3F)  ALBUTerol    90 MICROgram(s) HFA Inhaler 2 Puff(s) Inhalation every 4 hours PRN Shortness of Breath and/or Wheezing  LORazepam   Injectable 1 milliGRAM(s) IV Push every 4 hours PRN Agitation  ondansetron Injectable 4 milliGRAM(s) IV Push every 6 hours PRN Nausea and/or Vomiting  senna 2 Tablet(s) Oral at bedtime PRN Constipation      Allergies: NKDA    ROS:  SEE BELOW        ICU Vital Signs Last 24 Hrs  T(C): 37.9 (25 Mar 2021 08:00), Max: 38.7 (24 Mar 2021 10:00)  T(F): 100.2 (25 Mar 2021 08:00), Max: 101.7 (24 Mar 2021 10:00)  HR: 104 (25 Mar 2021 09:00) (82 - 119)  BP: 127/74 (25 Mar 2021 09:00) (98/52 - 189/128)  BP(mean): 82 (25 Mar 2021 09:00) (63 - 142)  ABP: --  ABP(mean): --  RR: 38 (25 Mar 2021 09:00) (25 - 40)  SpO2: 95% (25 Mar 2021 09:00) (92% - 100%)      Mode: AC/ CMV (Assist Control/ Continuous Mandatory Ventilation)  RR (machine): 24  TV (machine): 375  FiO2: 50  PEEP: 10  ITime: 1  MAP: 11  PIP: 22      I&O's Summary    24 Mar 2021 07:01  -  25 Mar 2021 07:00  --------------------------------------------------------  IN: 2390 mL / OUT: 1250 mL / NET: 1140 mL    25 Mar 2021 07:01  -  25 Mar 2021 09:52  --------------------------------------------------------  IN: 175 mL / OUT: 0 mL / NET: 175 mL        Physical Exam:  SEE BELOW                          12.0   26.06 )-----------( 513      ( 25 Mar 2021 06:40 )             38.2       03-25    144  |  113<H>  |  58<H>  ----------------------------<  136<H>  4.5   |  25  |  1.05    Ca    8.0<L>      25 Mar 2021 06:40  Phos  2.7     03-25  Mg     2.4     03-25    TPro  6.1  /  Alb  1.8<L>  /  TBili  0.4  /  DBili  0.2  /  AST  118<H>  /  ALT  95<H>  /  AlkPhos  147<H>  03-25      CARDIAC MARKERS ( 25 Mar 2021 06:40 )  x     / x     / 1361 U/L / x     / x      CARDIAC MARKERS ( 24 Mar 2021 06:54 )  x     / x     / 1237 U/L / x     / x      CARDIAC MARKERS ( 23 Mar 2021 10:20 )  x     / x     / 1222 U/L / x     / x                    DVT Prophylaxis:                                                            Contraindication:     Advanced Directives:    Discussed with:    Visit Information:  Time spent excluding procedure:      ** Time is exclusive of billed procedures and/or teaching and/or routine family updates.

## 2021-03-25 NOTE — PROGRESS NOTE ADULT - SUBJECTIVE AND OBJECTIVE BOX
Patient is a 80y old  Male who presents with a chief complaint of COVID PNA (23 Mar 2021 10:22)      BRIEF HOSPITAL COURSE:   80M with PMHx of Hep C, CKD III, HFrEF (35%) who presented with SOB and +COVID. Admitted to medical floor. Course complicated by AMS and R sided weakness. Found to have new watershed CVA  with pAF. Started on full AC. Subsequent RRT for MS decline, intubated for airway protection. Repeat Head CT with new acute evolving CVAs. Worsening hypoxia on vent and RLE limb ischemia.    Events last 24 hours: afebrile overnight, BP stable, remains on full vent support, received on 55%/+10, tolerating TF, remains sedated on propofol for pt vent synchrony, no real mental status with sedation vacation reported yesterday.      PAST MEDICAL & SURGICAL HISTORY:  Liver dysfunction    CKD (chronic kidney disease)    CHF (congestive heart failure)    Hepatitis C        Review of Systems:  unable to obtain at this time, pt sedated and intubated      Medications:  cefepime   IVPB 2000 milliGRAM(s) IV Intermittent every 12 hours  remdesivir  IVPB 100 milliGRAM(s) IV Intermittent every 24 hours    carvedilol 12.5 milliGRAM(s) Oral every 12 hours    ALBUTerol    90 MICROgram(s) HFA Inhaler 2 Puff(s) Inhalation every 4 hours PRN  budesonide 160 MICROgram(s)/formoterol 4.5 MICROgram(s) Inhaler 2 Puff(s) Inhalation two times a day    acetaminophen   Tablet .. 650 milliGRAM(s) Oral every 4 hours PRN  ondansetron Injectable 4 milliGRAM(s) IV Push every 6 hours PRN  propofol Infusion 10 MICROgram(s)/kG/Min IV Continuous <Continuous>      apixaban 5 milliGRAM(s) Oral every 12 hours  aspirin  chewable 81 milliGRAM(s) Oral daily    pantoprazole  Injectable 40 milliGRAM(s) IV Push daily  polyethylene glycol 3350 17 Gram(s) Oral daily  senna 2 Tablet(s) Oral at bedtime PRN      dexAMETHasone  Injectable 6 milliGRAM(s) IV Push daily        chlorhexidine 0.12% Liquid 15 milliLiter(s) Oral Mucosa every 12 hours        Mode: AC/ CMV (Assist Control/ Continuous Mandatory Ventilation)  RR (machine): 24  TV (machine): 375  FiO2: 50  PEEP: 10  ITime: 1  MAP: 11  PIP: 21      ICU Vital Signs Last 24 Hrs  T(C): 37.7 (24 Mar 2021 22:00), Max: 38.7 (24 Mar 2021 10:00)  T(F): 99.8 (24 Mar 2021 22:00), Max: 101.7 (24 Mar 2021 10:00)  HR: 96 (24 Mar 2021 23:03) (82 - 113)  BP: 137/68 (24 Mar 2021 23:00) (98/52 - 151/78)  BP(mean): 83 (24 Mar 2021 23:00) (63 - 105)  ABP: --  ABP(mean): --  RR: 36 (24 Mar 2021 23:00) (28 - 38)  SpO2: 96% (24 Mar 2021 23:03) (93% - 100%)      ABG - ( 23 Mar 2021 05:59 )  pH, Arterial: 7.46  pH, Blood: x     /  pCO2: 36    /  pO2: 81    / HCO3: 25    / Base Excess: 1.7   /  SaO2: 95            I&O's Summary    23 Mar 2021 07:01  -  24 Mar 2021 07:00  --------------------------------------------------------  IN: 2637.6 mL / OUT: 650 mL / NET: 1987.6 mL    24 Mar 2021 07:01  -  25 Mar 2021 00:04  --------------------------------------------------------  IN: 1424 mL / OUT: 750 mL / NET: 674 mL                LABS:                        12.7   27.04 )-----------( 557      ( 24 Mar 2021 06:54 )             40.3     03-24    146<H>  |  114<H>  |  50<H>  ----------------------------<  111<H>  4.7   |  28  |  1.00    Ca    8.4<L>      24 Mar 2021 06:54  Phos  2.6     03-24  Mg     2.6     03-24    TPro  6.6  /  Alb  2.1<L>  /  TBili  0.5  /  DBili  0.2  /  AST  134<H>  /  ALT  110<H>  /  AlkPhos  151<H>  03-24      CARDIAC MARKERS ( 24 Mar 2021 06:54 )  x     / x     / 1237 U/L / x     / x      CARDIAC MARKERS ( 23 Mar 2021 10:20 )  x     / x     / 1222 U/L / x     / x          CAPILLARY BLOOD GLUCOSE        PT/INR - ( 24 Mar 2021 06:54 )   PT: 18.3 sec;   INR: 1.61 ratio           Urinalysis Basic - ( 23 Mar 2021 01:28 )    Color: Yellow / Appearance: Clear / S.015 / pH: x  Gluc: x / Ketone: Negative  / Bili: Negative / Urobili: 4 mg/dL   Blood: x / Protein: 30 mg/dL / Nitrite: Negative   Leuk Esterase: Negative / RBC: 0-2 /HPF / WBC 0-2   Sq Epi: x / Non Sq Epi: Occasional / Bacteria: Occasional        Physical Examination:    General: sedated and intubated, + gag, no response to painful stimuli    HEENT: Pupils equal, reactive to light.  Symmetric, sclera anicteric    PULM: Course BS bilaterally, no wheezing, no significant sputum production    CVS: regular rate and rhythm    ABD: Soft, nondistended, normoactive bowel sounds    EXT: No edema, RLE with mottling and demarcation, palpable R fem pulse, remains without doppler signals  R DP/PT/pop    SKIN: Warm and well perfused upper extremities/central and LLE    RADIOLOGY:   EXAM:  CT BRAIN                            PROCEDURE DATE:  2021          INTERPRETATION:  CLINICAL HISTORY:  Stroke. On Eliquis. Obtunded. Evaluate for intracranial hemorrhage.    TECHNIQUE:  CT of the head without contrast.  Contiguous transaxial images of the head were acquired from the skull base to the vertex without the administration of iodinated contrast. Coronal and sagittal reformatted images are provided.    COMPARISON: CT abdomen 3/21/2021 at 12:02 PM    FINDINGS:    Evolutionary changes are seen within the multifocal infarcts within both frontal and left parietal lobes including increasing low attenuation and mass effect from edema. There is no significant hemorrhagic conversion. Vague high attenuation the left frontal lobe infarct on series 2 images 21 and 22 appears to be related to beam hardening artifact on the reformatted images. New low-attenuation involves the head of the right caudate nucleus with stable appearance in the head of the left caudate nucleus. The posterior fossa is unremarkable. Stable mild chronic microvascular changes.    The ventricles, sulci and cisternal spaces are stable in size and configuration without evidence of hydrocephalus.  There is no midline shift or abnormal extra-axial fluid collection.    The calvarium is intact.  There are no osteoblastic or lytic calvarial or skull base lesions.  The paranasal sinuses and mastoid air cells are clear.    IMPRESSION:  Evolving infarcts in both frontal and left parietal lobes without significant hemorrhagic conversion. New infarct in the head of the right caudate nucleus. No midline shift or hydrocephalus.    PAMELA ARREDONDO MD; Attending Radiologist  This document has been electronically signed. Mar 21 2021 11:32PM    EXAM:  XR CHEST PORTABLE URGENT 1V                            PROCEDURE DATE:  2021          INTERPRETATION:  Portable chest radiograph    CLINICAL INFORMATION: Intubation.    TECHNIQUE:  Portable  AP view of the chest was obtained.    COMPARISON: 10/9/2014  available for review.    FINDINGS: ET tube tip above tracheal bifurcation.  NG tube tip beyond GE junction.    The lungs show mild bilateral  multifocal and diffuse ill-defined airspace consolidations. No pneumothorax.    The heart and mediastinum are within normal limits.    Visualized osseous structures are intact.        IMPRESSION:   ET tube tip above tracheal bifurcation.  NG tube tip beyond GE junction.  . Mild bilateral  multifocal and diffuse ill-defined airspace consolidations.    DIMAS HUMPHRIES MD; Attending Radiologist  This document has been electronically signed. Mar 22 2021  4:39PM    ******PRELIMINARY REPORT******    ******PRELIMINARY REPORT******          EXAM:  US DPLX LWR EXT ARTS LTD RT                            PROCEDURE DATE:  2021    ******PRELIMINARY REPORT******    ******PRELIMINARY REPORT******              INTERPRETATION:  [facility] RADIOLOGIST PRELIMINARY REPORT    PROCEDURE INFORMATION:  Exam: US Duplex Right Lower Extremity Arteries Or Arterial Bypass Grafts  Exam date and time: 3/23/2021 12:34 AM  Age: 80 years old  Clinical indication: Other: Cold RT le/foot and no pulses felt. ; Patient HX:  Stent RT le fa    TECHNIQUE:  Imaging protocol: Right Real-time duplex scan of the arteries or arterial  bypass grafts of the right lower extremity with 2-D gray scale, color Doppler  flow and spectral waveform analysis. Images documented and saved.    COMPARISON:  No relevant prior studies available.    FINDINGS:  Right common femoral artery:  At least mild irregular luminal narrowing.  Biphasic flow, nonspecific.  Peak systolic velocity is 65.9 cm/s.  Right superficial femoral artery:  No flow is identified within the right SFA,  through the stent graft, to the level of the popliteal arteries.  Right popliteal artery: No occlusion or significant stenosis. Normal waveform.  Right calf/foot arteries:  The right dorsalis pedis artery is not identified.    Soft tissues: No hematoma or collection.    IMPRESSION:  1.  Occlusion of the right SFA, through the stent graft, to level of the  popliteal arteries.  2. At least mild, luminal, irregular narrowing of the CFA.    EXAM:  US DPLX CAROTIDS COMPL BI                            PROCEDURE DATE:  2021          INTERPRETATION:  CLINICAL INFORMATION: Multiple CVAs.    COMPARISON: MRA neck 3/18/2021    TECHNIQUE: Grayscale, color and spectral Doppler examination of both carotid arteries was performed. The examination was technically limited secondary to difficulty with patient positioning.    FINDINGS:    There is extensive plaque in the right carotid bulb. Elevated peak systolic velocities in the right carotid bulb suggestive of a greater than 70% stenosis; measuring up to 404 cm/s.    Mild to moderate plaque in the left carotid bulb. Peak systolic velocities in the area of plaque are within normal limits. Elevated peak systolic velocity in the mid left internal carotid artery of 138 cm/s which is likely technical due to tortuosity.    Mild plaque in the left common carotid artery.    Right vertebral artery not seen. Antegrade flow in left vertebral artery.    IMPRESSION:    Technically limited study.    Extensive plaque in the right carotid bulb with associated elevated peak systolic velocity suggestive of a greater than 70% stenosis.    Mild to moderate plaque in the left carotid bulb with less than 50% left internal carotid artery stenosis.    Right vertebral artery not seen.    Given the technical limitations of this study, a CTA is recommended for further evaluation.    The findings were discussed with Dr. Lobato at 4:31 PM on 3/22/2021.    Measurement of carotid stenosis is based on velocity parameters that correlate the residual internal carotid diameter with that of the more distal vessel in accordance with a method such as the [employer] Symptomatic Carotid Endarterectomy Trial (NASCET).    SCOTT ANGULO MD; Attending Radiologist  This document has been electronically signed. Mar 22 2021  4:32PM    EXAM:  US DPLX LWR EXT VEINS COMPL BI                            PROCEDURE DATE:  2021          INTERPRETATION:  CLINICAL INFORMATION: COVID. Stroke.    COMPARISON: None available.    TECHNIQUE: Duplex sonography of the BILATERAL LOWER extremity veins with color and spectral Doppler, with and without compression.    FINDINGS:    RIGHT:  Normal compressibility of the RIGHT common femoral, femoral and popliteal veins.  Doppler examination shows normal spontaneous and phasic flow.  No RIGHT calf vein thrombosis is detected.    LEFT:  Normal compressibility of the LEFT common femoral, femoral and popliteal veins.  Doppler examination shows normal spontaneous and phasic flow.  No LEFT calf vein thrombosis is detected.    IMPRESSION:  No evidence of deep venous thrombosis in either lower extremity.                SCOTT ANGULO MD; Attending Radiologist  This document has been electronically signed. Mar 22 2021  2:24PM    EXAM:  ECHO TTE WO CON COMP W DOPP         PROCEDURE DATE:  2021        INTERPRETATION:  Transthoracic Echocardiography Report (TTE)     Demographics     Patient name         AILIN BORJAS V  Age           80 year(s)     [facility] Rec #            [id number]          Gender        Male     Account #            [id number]       Date of Birth 1940     Interpreting         Richard Issa MD  Room Number   0308   Physician     Referring Physician  KALYN OTOOLE MD    Sonographer   Radha Maxwell,                                                         [facility]     Date of study        2021 12:00                        PM     Height               62.01 in           Weight        130.96 pounds    Type of Study:     TTE procedure: ECHO TTE WO CON COMP W DOP     BP: 170/90 mmHg     [facility]    Indications   1) R07.9 - Chest pain    M-Mode Measurements (cm)     LVEDd: 5.28 cm            LVESd: 4.4 cm   IVSEd: 0.97 cm   LVPWd: 0.92 cm            AO Root Dimension: 3.3 cm                             ACS: 1.9 cm                             LA: 3.8 cm    Doppler Measurements:     AV Velocity:109 cm/s                MV Peak E-Wave: 43.9 cm/s   AV Peak Gradient: 4.75 mmHg         MV Peak A-Wave: 77.5 cm/s                                       MV E/A Ratio: 0.57 %                                       MV Peak Gradient: 0.77 mmHg     Findings     Mitral Valve   Mild mitral annular calcification is present.   Fibrocalcific changes noted to the mitral valve leaflets with preserved   leaflet excursion.   Mild to moderate mitral regurgitation.     Aortic Valve   Fibrocalcific changes noted to the Aortic valve leaflets with preserved   leaflet excursion.     Tricuspid Valve   The tricuspid valve leaflets are thin and pliable; valve motion is normal.   Trace tricuspid valve regurgitation.     Pulmonic Valve   Pulmonic valve not well seen.     Left Atrium   Normal appearing left atrium.     Left Ventricle   Endocardium is not well visualized, however, overall left ventricular   systolic function appears moderately decreased. The septum appears   severely hypokinetic. Estimated left ventricular ejection fraction is 35%.     Right Atrium   The right atrium appears dilated.     Right Ventricle   The right ventricle is normal in size.     Pericardial Effusion   No evidence of pericardial effusion.     Pleural Effusion   No evidence of pleural effusion.     Miscellaneous   The IVC appears normal.     Impression     Summary     Endocardium is not well visualized, however, overall left ventricular   systolic function appears moderately decreased. The septum appears   severely hypokinetic. Estimated left ventricular ejection fraction is 35%.   Mild to moderate mitral regurgitation.     Signature     ----------------------------------------------------------------   Electronically signed by Richard Issa MD(Interpreting   physician) on 2021 02:57 PM    CRITICAL CARE TIME SPENT: 35 mins assessing presenting problems of acute illness that poses high probability of life threatening deterioration or end organ damage/dysfunction.  Medical decision making including Initiating plan of care, reviewing data, reviewing radiology, direct patient bedside evaluation and interpretation of vital signs, any necessary ventilator management , discussion with multidisciplinary team, all non inclusive of procedures      Patient is a 80y old  Male who presents with a chief complaint of COVID PNA (23 Mar 2021 10:22)      BRIEF HOSPITAL COURSE:   80M with PMHx of Hep C, CKD III, HFrEF (35%) who presented with SOB and +COVID. Admitted to medical floor. Course complicated by AMS and R sided weakness. Found to have new watershed CVA  with pAF. Started on full AC. Subsequent RRT for MS decline, intubated for airway protection. Repeat Head CT with new acute evolving CVAs. Worsening hypoxia on vent and RLE limb ischemia.    Events last 24 hours: afebrile overnight, BP stable, remains on full vent support, received on 55%/+10, tolerating TF, remains sedated on propofol for pt vent synchrony, no real mental status improvement with sedation vacation reported yesterday.      PAST MEDICAL & SURGICAL HISTORY:  Liver dysfunction    CKD (chronic kidney disease)    CHF (congestive heart failure)    Hepatitis C        Review of Systems:  unable to obtain at this time, pt sedated and intubated      Medications:  cefepime   IVPB 2000 milliGRAM(s) IV Intermittent every 12 hours  remdesivir  IVPB 100 milliGRAM(s) IV Intermittent every 24 hours    carvedilol 12.5 milliGRAM(s) Oral every 12 hours    ALBUTerol    90 MICROgram(s) HFA Inhaler 2 Puff(s) Inhalation every 4 hours PRN  budesonide 160 MICROgram(s)/formoterol 4.5 MICROgram(s) Inhaler 2 Puff(s) Inhalation two times a day    acetaminophen   Tablet .. 650 milliGRAM(s) Oral every 4 hours PRN  ondansetron Injectable 4 milliGRAM(s) IV Push every 6 hours PRN  propofol Infusion 10 MICROgram(s)/kG/Min IV Continuous <Continuous>      apixaban 5 milliGRAM(s) Oral every 12 hours  aspirin  chewable 81 milliGRAM(s) Oral daily    pantoprazole  Injectable 40 milliGRAM(s) IV Push daily  polyethylene glycol 3350 17 Gram(s) Oral daily  senna 2 Tablet(s) Oral at bedtime PRN      dexAMETHasone  Injectable 6 milliGRAM(s) IV Push daily        chlorhexidine 0.12% Liquid 15 milliLiter(s) Oral Mucosa every 12 hours        Mode: AC/ CMV (Assist Control/ Continuous Mandatory Ventilation)  RR (machine): 24  TV (machine): 375  FiO2: 50  PEEP: 10  ITime: 1  MAP: 11  PIP: 21      ICU Vital Signs Last 24 Hrs  T(C): 37.7 (24 Mar 2021 22:00), Max: 38.7 (24 Mar 2021 10:00)  T(F): 99.8 (24 Mar 2021 22:00), Max: 101.7 (24 Mar 2021 10:00)  HR: 96 (24 Mar 2021 23:03) (82 - 113)  BP: 137/68 (24 Mar 2021 23:00) (98/52 - 151/78)  BP(mean): 83 (24 Mar 2021 23:00) (63 - 105)  ABP: --  ABP(mean): --  RR: 36 (24 Mar 2021 23:00) (28 - 38)  SpO2: 96% (24 Mar 2021 23:03) (93% - 100%)      ABG - ( 23 Mar 2021 05:59 )  pH, Arterial: 7.46  pH, Blood: x     /  pCO2: 36    /  pO2: 81    / HCO3: 25    / Base Excess: 1.7   /  SaO2: 95            I&O's Summary    23 Mar 2021 07:01  -  24 Mar 2021 07:00  --------------------------------------------------------  IN: 2637.6 mL / OUT: 650 mL / NET: 1987.6 mL    24 Mar 2021 07:01  -  25 Mar 2021 00:04  --------------------------------------------------------  IN: 1424 mL / OUT: 750 mL / NET: 674 mL                LABS:                        12.7   27.04 )-----------( 557      ( 24 Mar 2021 06:54 )             40.3     03-24    146<H>  |  114<H>  |  50<H>  ----------------------------<  111<H>  4.7   |  28  |  1.00    Ca    8.4<L>      24 Mar 2021 06:54  Phos  2.6     03-24  Mg     2.6     03-24    TPro  6.6  /  Alb  2.1<L>  /  TBili  0.5  /  DBili  0.2  /  AST  134<H>  /  ALT  110<H>  /  AlkPhos  151<H>  03-24      CARDIAC MARKERS ( 24 Mar 2021 06:54 )  x     / x     / 1237 U/L / x     / x      CARDIAC MARKERS ( 23 Mar 2021 10:20 )  x     / x     / 1222 U/L / x     / x          CAPILLARY BLOOD GLUCOSE        PT/INR - ( 24 Mar 2021 06:54 )   PT: 18.3 sec;   INR: 1.61 ratio           Urinalysis Basic - ( 23 Mar 2021 01:28 )    Color: Yellow / Appearance: Clear / S.015 / pH: x  Gluc: x / Ketone: Negative  / Bili: Negative / Urobili: 4 mg/dL   Blood: x / Protein: 30 mg/dL / Nitrite: Negative   Leuk Esterase: Negative / RBC: 0-2 /HPF / WBC 0-2   Sq Epi: x / Non Sq Epi: Occasional / Bacteria: Occasional        Physical Examination:    General: sedated and intubated, + gag, no response to painful stimuli    HEENT: Pupils equal, reactive to light.  Symmetric, sclera anicteric    PULM: Course BS bilaterally, no wheezing, no significant sputum production    CVS: regular rate and rhythm    ABD: Soft, nondistended, normoactive bowel sounds    EXT: No edema, RLE with mottling and demarcation, palpable R fem pulse, remains without doppler signals  R DP/PT/pop    SKIN: Warm and well perfused upper extremities/central and LLE    RADIOLOGY:   EXAM:  CT BRAIN                            PROCEDURE DATE:  2021          INTERPRETATION:  CLINICAL HISTORY:  Stroke. On Eliquis. Obtunded. Evaluate for intracranial hemorrhage.    TECHNIQUE:  CT of the head without contrast.  Contiguous transaxial images of the head were acquired from the skull base to the vertex without the administration of iodinated contrast. Coronal and sagittal reformatted images are provided.    COMPARISON: CT abdomen 3/21/2021 at 12:02 PM    FINDINGS:    Evolutionary changes are seen within the multifocal infarcts within both frontal and left parietal lobes including increasing low attenuation and mass effect from edema. There is no significant hemorrhagic conversion. Vague high attenuation the left frontal lobe infarct on series 2 images 21 and 22 appears to be related to beam hardening artifact on the reformatted images. New low-attenuation involves the head of the right caudate nucleus with stable appearance in the head of the left caudate nucleus. The posterior fossa is unremarkable. Stable mild chronic microvascular changes.    The ventricles, sulci and cisternal spaces are stable in size and configuration without evidence of hydrocephalus.  There is no midline shift or abnormal extra-axial fluid collection.    The calvarium is intact.  There are no osteoblastic or lytic calvarial or skull base lesions.  The paranasal sinuses and mastoid air cells are clear.    IMPRESSION:  Evolving infarcts in both frontal and left parietal lobes without significant hemorrhagic conversion. New infarct in the head of the right caudate nucleus. No midline shift or hydrocephalus.    PAMELA ARREDONDO MD; Attending Radiologist  This document has been electronically signed. Mar 21 2021 11:32PM    EXAM:  XR CHEST PORTABLE URGENT 1V                            PROCEDURE DATE:  2021          INTERPRETATION:  Portable chest radiograph    CLINICAL INFORMATION: Intubation.    TECHNIQUE:  Portable  AP view of the chest was obtained.    COMPARISON: 10/9/2014  available for review.    FINDINGS: ET tube tip above tracheal bifurcation.  NG tube tip beyond GE junction.    The lungs show mild bilateral  multifocal and diffuse ill-defined airspace consolidations. No pneumothorax.    The heart and mediastinum are within normal limits.    Visualized osseous structures are intact.        IMPRESSION:   ET tube tip above tracheal bifurcation.  NG tube tip beyond GE junction.  . Mild bilateral  multifocal and diffuse ill-defined airspace consolidations.    DIMAS HUMPHRIES MD; Attending Radiologist  This document has been electronically signed. Mar 22 2021  4:39PM    ******PRELIMINARY REPORT******    ******PRELIMINARY REPORT******          EXAM:  US DPLX LWR EXT ARTS LTD RT                            PROCEDURE DATE:  2021    ******PRELIMINARY REPORT******    ******PRELIMINARY REPORT******              INTERPRETATION:  [facility] RADIOLOGIST PRELIMINARY REPORT    PROCEDURE INFORMATION:  Exam: US Duplex Right Lower Extremity Arteries Or Arterial Bypass Grafts  Exam date and time: 3/23/2021 12:34 AM  Age: 80 years old  Clinical indication: Other: Cold RT le/foot and no pulses felt. ; Patient HX:  Stent RT le fa    TECHNIQUE:  Imaging protocol: Right Real-time duplex scan of the arteries or arterial  bypass grafts of the right lower extremity with 2-D gray scale, color Doppler  flow and spectral waveform analysis. Images documented and saved.    COMPARISON:  No relevant prior studies available.    FINDINGS:  Right common femoral artery:  At least mild irregular luminal narrowing.  Biphasic flow, nonspecific.  Peak systolic velocity is 65.9 cm/s.  Right superficial femoral artery:  No flow is identified within the right SFA,  through the stent graft, to the level of the popliteal arteries.  Right popliteal artery: No occlusion or significant stenosis. Normal waveform.  Right calf/foot arteries:  The right dorsalis pedis artery is not identified.    Soft tissues: No hematoma or collection.    IMPRESSION:  1.  Occlusion of the right SFA, through the stent graft, to level of the  popliteal arteries.  2. At least mild, luminal, irregular narrowing of the CFA.    EXAM:  US DPLX CAROTIDS COMPL BI                            PROCEDURE DATE:  2021          INTERPRETATION:  CLINICAL INFORMATION: Multiple CVAs.    COMPARISON: MRA neck 3/18/2021    TECHNIQUE: Grayscale, color and spectral Doppler examination of both carotid arteries was performed. The examination was technically limited secondary to difficulty with patient positioning.    FINDINGS:    There is extensive plaque in the right carotid bulb. Elevated peak systolic velocities in the right carotid bulb suggestive of a greater than 70% stenosis; measuring up to 404 cm/s.    Mild to moderate plaque in the left carotid bulb. Peak systolic velocities in the area of plaque are within normal limits. Elevated peak systolic velocity in the mid left internal carotid artery of 138 cm/s which is likely technical due to tortuosity.    Mild plaque in the left common carotid artery.    Right vertebral artery not seen. Antegrade flow in left vertebral artery.    IMPRESSION:    Technically limited study.    Extensive plaque in the right carotid bulb with associated elevated peak systolic velocity suggestive of a greater than 70% stenosis.    Mild to moderate plaque in the left carotid bulb with less than 50% left internal carotid artery stenosis.    Right vertebral artery not seen.    Given the technical limitations of this study, a CTA is recommended for further evaluation.    The findings were discussed with Dr. Lobato at 4:31 PM on 3/22/2021.    Measurement of carotid stenosis is based on velocity parameters that correlate the residual internal carotid diameter with that of the more distal vessel in accordance with a method such as the [employer] Symptomatic Carotid Endarterectomy Trial (NASCET).    SCOTT ANGULO MD; Attending Radiologist  This document has been electronically signed. Mar 22 2021  4:32PM    EXAM:  US DPLX LWR EXT VEINS COMPL BI                            PROCEDURE DATE:  2021          INTERPRETATION:  CLINICAL INFORMATION: COVID. Stroke.    COMPARISON: None available.    TECHNIQUE: Duplex sonography of the BILATERAL LOWER extremity veins with color and spectral Doppler, with and without compression.    FINDINGS:    RIGHT:  Normal compressibility of the RIGHT common femoral, femoral and popliteal veins.  Doppler examination shows normal spontaneous and phasic flow.  No RIGHT calf vein thrombosis is detected.    LEFT:  Normal compressibility of the LEFT common femoral, femoral and popliteal veins.  Doppler examination shows normal spontaneous and phasic flow.  No LEFT calf vein thrombosis is detected.    IMPRESSION:  No evidence of deep venous thrombosis in either lower extremity.                SCOTT ANGULO MD; Attending Radiologist  This document has been electronically signed. Mar 22 2021  2:24PM    EXAM:  ECHO TTE WO CON COMP W DOPP         PROCEDURE DATE:  2021        INTERPRETATION:  Transthoracic Echocardiography Report (TTE)     Demographics     Patient name         AILIN BORJAS V  Age           80 year(s)     [facility] Rec #            [id number]          Gender        Male     Account #            [id number]       Date of Birth 1940     Interpreting         Richard Issa MD  Room Number   0308   Physician     Referring Physician  KALYN OTOOLE MD    Sonographer   Radha Maxwell,                                                         [facility]     Date of study        2021 12:00                        PM     Height               62.01 in           Weight        130.96 pounds    Type of Study:     TTE procedure: ECHO TTE WO CON COMP W DOP     BP: 170/90 mmHg     Study Location: [address] [place], [place] [address]ical Quality: Technically Difficullt    Indications   1) R07.9 - Chest pain    M-Mode Measurements (cm)     LVEDd: 5.28 cm            LVESd: 4.4 cm   IVSEd: 0.97 cm   LVPWd: 0.92 cm            AO Root Dimension: 3.3 cm                             ACS: 1.9 cm                             LA: 3.8 cm    Doppler Measurements:     AV Velocity:109 cm/s                MV Peak E-Wave: 43.9 cm/s   AV Peak Gradient: 4.75 mmHg         MV Peak A-Wave: 77.5 cm/s                                       MV E/A Ratio: 0.57 %                                       MV Peak Gradient: 0.77 mmHg     Findings     Mitral Valve   Mild mitral annular calcification is present.   Fibrocalcific changes noted to the mitral valve leaflets with preserved   leaflet excursion.   Mild to moderate mitral regurgitation.     Aortic Valve   Fibrocalcific changes noted to the Aortic valve leaflets with preserved   leaflet excursion.     Tricuspid Valve   The tricuspid valve leaflets are thin and pliable; valve motion is normal.   Trace tricuspid valve regurgitation.     Pulmonic Valve   Pulmonic valve not well seen.     Left Atrium   Normal appearing left atrium.     Left Ventricle   Endocardium is not well visualized, however, overall left ventricular   systolic function appears moderately decreased. The septum appears   severely hypokinetic. Estimated left ventricular ejection fraction is 35%.     Right Atrium   The right atrium appears dilated.     Right Ventricle   The right ventricle is normal in size.     Pericardial Effusion   No evidence of pericardial effusion.     Pleural Effusion   No evidence of pleural effusion.     Miscellaneous   The IVC appears normal.     Impression     Summary     Endocardium is not well visualized, however, overall left ventricular   systolic function appears moderately decreased. The septum appears   severely hypokinetic. Estimated left ventricular ejection fraction is 35%.   Mild to moderate mitral regurgitation.     Signature     ----------------------------------------------------------------   Electronically signed by Richard Issa MD(Interpreting   physician) on 2021 02:57 PM    CRITICAL CARE TIME SPENT: 35 mins assessing presenting problems of acute illness that poses high probability of life threatening deterioration or end organ damage/dysfunction.  Medical decision making including Initiating plan of care, reviewing data, reviewing radiology, direct patient bedside evaluation and interpretation of vital signs, any necessary ventilator management , discussion with multidisciplinary team, all non inclusive of procedures

## 2021-03-25 NOTE — PROGRESS NOTE ADULT - ATTENDING COMMENTS
As above, pt seen and examined with Dr Lobato and treatment plan formulated on rounds    LE dopp Negative for DVT B/L
As above, pt seen and examined with Dr Lobato and treatment plan formulated on rounds.
As above, pt seen and examined with Dr Lobato and treatment plan formulated on rounds.     Will Add vasotec 2.5 daily as after load reducer.  Cont with BBx and DOAC.  Euvolemic

## 2021-03-25 NOTE — PROGRESS NOTE ADULT - ASSESSMENT
79 y/o male with h/o chronic hepatitis C, liver dysfunction, chronic CHF, CKD stage III, recently diagnosed with COVID-19 viral syndrome was admitted on 3/17 for worsening SOB.  Pt tested COVID + on 3/7/21. The pt is a poor historian at baseline. Pt's O2 at home in 80s prompting him to come to ED. In the ER, sats 89% on RA.  Patient started on 5L NC and sats improved to upper 90's.     1. COVID-19 viral syndrome. Acute respiratory failure. Multifocal pneumonia. Right foot ischemia ?underlying cellulitis.   -respiratory frail  -encephalopathy  -leukocytosis sightly improved  -on remdesivir protocol # 8  -on cefepime 2 gm IV q12h # 3  -tolerating abx well so far; no side effects noted  -AC  -steroids  -respiratory care  -droplet isolation  -continue  antimicrobial therapy  -prognosis is poor  -monitor temps  -f/u CBC  -supportive care  2. Other issues:   -care per medicine    Case d/w Dr. Giles

## 2021-03-25 NOTE — PROGRESS NOTE ADULT - ASSESSMENT
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 Merrill

## 2021-03-25 NOTE — PROGRESS NOTE ADULT - SUBJECTIVE AND OBJECTIVE BOX
Patient is a 80y old  Male with COVID pneumonia, intubated and sedated. Vitals WNL.                  Vital Signs Last 24 Hrs  T(C): 38.6 (25 Mar 2021 05:00), Max: 38.7 (24 Mar 2021 10:00)  T(F): 101.5 (25 Mar 2021 05:00), Max: 101.7 (24 Mar 2021 10:00)  HR: 102 (25 Mar 2021 06:21) (82 - 119)  BP: 103/61 (25 Mar 2021 06:21) (98/52 - 189/128)  BP(mean): 71 (25 Mar 2021 06:21) (63 - 142)  RR: 38 (25 Mar 2021 06:21) (25 - 40)  SpO2: 93% (25 Mar 2021 06:21) (92% - 100%)    Labs:      CARDIAC MARKERS ( 24 Mar 2021 06:54 )  x     / x     / 1237 U/L / x     / x      CARDIAC MARKERS ( 23 Mar 2021 10:20 )  x     / x     / 1222 U/L / x     / x                                12.7   27.04 )-----------( 557      ( 24 Mar 2021 06:54 )             40.3     CBC Full  -  ( 24 Mar 2021 06:54 )  WBC Count : 27.04 K/uL  RBC Count : 4.13 M/uL  Hemoglobin : 12.7 g/dL  Hematocrit : 40.3 %  Platelet Count - Automated : 557 K/uL  Mean Cell Volume : 97.6 fl  Mean Cell Hemoglobin : 30.8 pg  Mean Cell Hemoglobin Concentration : 31.5 gm/dL  Auto Neutrophil # : x  Auto Lymphocyte # : x  Auto Monocyte # : x  Auto Eosinophil # : x  Auto Basophil # : x  Auto Neutrophil % : x  Auto Lymphocyte % : x  Auto Monocyte % : x  Auto Eosinophil % : x  Auto Basophil % : x    03-24    146<H>  |  114<H>  |  50<H>  ----------------------------<  111<H>  4.7   |  28  |  1.00    Ca    8.4<L>      24 Mar 2021 06:54  Phos  2.6     03-24  Mg     2.6     03-24    TPro  6.6  /  Alb  2.1<L>  /  TBili  0.5  /  DBili  0.2  /  AST  134<H>  /  ALT  110<H>  /  AlkPhos  151<H>  03-24    LIVER FUNCTIONS - ( 24 Mar 2021 06:54 )  Alb: 2.1 g/dL / Pro: 6.6 gm/dL / ALK PHOS: 151 U/L / ALT: 110 U/L / AST: 134 U/L / GGT: x           PT/INR - ( 24 Mar 2021 06:54 )   PT: 18.3 sec;   INR: 1.61 ratio               Meds:  acetaminophen   Tablet .. 650 milliGRAM(s) Oral every 4 hours PRN  ALBUTerol    90 MICROgram(s) HFA Inhaler 2 Puff(s) Inhalation every 4 hours PRN  apixaban 5 milliGRAM(s) Oral every 12 hours  aspirin  chewable 81 milliGRAM(s) Oral daily  budesonide 160 MICROgram(s)/formoterol 4.5 MICROgram(s) Inhaler 2 Puff(s) Inhalation two times a day  carvedilol 12.5 milliGRAM(s) Oral every 12 hours  cefepime   IVPB 2000 milliGRAM(s) IV Intermittent every 12 hours  chlorhexidine 0.12% Liquid 15 milliLiter(s) Oral Mucosa every 12 hours  dexAMETHasone  Injectable 6 milliGRAM(s) IV Push daily  ondansetron Injectable 4 milliGRAM(s) IV Push every 6 hours PRN  pantoprazole  Injectable 40 milliGRAM(s) IV Push daily  polyethylene glycol 3350 17 Gram(s) Oral daily  propofol Infusion 10 MICROgram(s)/kG/Min IV Continuous <Continuous>  remdesivir  IVPB 100 milliGRAM(s) IV Intermittent every 24 hours  senna 2 Tablet(s) Oral at bedtime PRN      Radiology:      Physical Examination:    General: sedated and intubated  PULM: Course BS bilaterally, no wheezing, no significant sputum production  CVS: irregular rate and rhythm  ABD: Soft, nondistended, normoactive bowel sounds  EXT: No edema, RLE with mottling and demarcation at mid right leg, palpable R femoral pulse, No dopplerable/palpable signals of right PT/DP/Pop.   SKIN: Warm and well perfused upper extremities/central and LLE

## 2021-03-25 NOTE — PROGRESS NOTE ADULT - ASSESSMENT
`Diagnosis: Right sided weakness 2/2 multiple CVA.     Acute Metabolic Encephalopathy     Type 1 respiratory Failure 2/2 viral covid PNA      Neurologic:   Metabolic encephalopathy 2/2 multiple CVA   Still unresponsive. Poor prognosis  MR Head(3/18)-Acute predominantly left-sided watershed distribution infarcts  CT head (3/21) Evolving infarcts in both frontal and left parietal lobes without significant hemorrhagic conversion. Shows worsening ischemic CVAs and new ischemia area in caudate nucleus.   -COnt. eliquis 5mg Qd  -Holding Atorvastatin in setting of elevated transaminases      Respiratory:   Type 1 respiratory Failure 2/2 viral covid PNA:    Fio2 to 50% Peep 10  Worsening Dyspnea  Continue ativan PRN for respiratory distress  COvid Positive 3/17. Viral Covid inflammatory markers elevated. Currently on Day 8/10 of remdesivir and decadron 6mg      Cardiovascular:   CHF w/ REF- EF 30-35%. Currently compensated  -Cont carvedilol  -New Onset Paroxy Afib-Chadvasc 5. On Eliquis. Rate is controlled. PRN Cardizem or Lopressor if patient reverts to RVR.    -`Doppler venous US(3/22) No present DVT.  - Carotid Artery US(3/22) 70% occlussion of Right Carotid bulb. 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 Will need surgical intervention in future    GI:   Hx of Hepatitis B. Ammonium 27 on admission  LFT's trending up 2/2 to remdesivir    ID:  -Persistent Fevers T Max 100.1 this Am  - Empiric IV abx with Cefepime day 3. Concern for developing septic foot(dry gangrene)  - Cont Remdesivir and Decadron for full course.  -NGTD 3/23 Blood Cultures  -Blood Cultures 3/17 NGTD    :   Monitor I/O's     Hematologic:   Hbg Stable  CK trending up      Dispo: Continue goals of care conversation. Reassess neurological status

## 2021-03-25 NOTE — PROGRESS NOTE ADULT - SUBJECTIVE AND OBJECTIVE BOX
H&P: 79 y/o male with a PMHx of hepatitis C, liver dysfunction, chronic CHF, CKD stage III who presents to the ED c/o SOB.  Pt tested COVID + on 3/7/21.  As per PMD, patient's girlfriend also has COVID.  PMD reports pt is a poor historian at baseline.  Pt's O2 at home in 80s prompting him to come to ED.  In the ER, sats 89% on RA.  Patient started on 5L NC and sats improved to upper 90's.  WBC elevated @ 13.  DDimer high 2434.  CTA negative for PE but positive for bilateral ground glass opacities c/w COVID-19.  Patient received Decadron 6 mg IV x1 in the ER and admitted.      3/22 Patient is a 80y old  Male who presents with a chief complaint of dyspnea ans right sided weakness on admission but did not recieve TPA due to being outside the therapeutic window. 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 Patient continue to have worsening mental status throughout this hospitalization and was transferred to ICU 3/21 when patient was  found to be obtunded and was intubated. Patient seen and evaluated at bedside. Patient is sedated on propofol.     3/24 Patient seen at bedside today. Patient still sedated on vent.    3/25 Patient seen at bedside. Still sedated and unrespnosive.         Notable Hx  He has an allergy to contrast as per his brother. He had respiratory arrest and kidney failure in the past after receiving contrast.   He also has a history of heparin induced thrombocytopenia.  He had angioplasty in his left leg and a stent in his right leg.      PAST MEDICAL & SURGICAL HISTORY:  Liver dysfunction    CKD (chronic kidney disease)    CHF (congestive heart failure)    Hepatitis C        Review of Systems:  Could not obtain    MEDICATIONS  (STANDING):  apixaban 5 milliGRAM(s) Oral every 12 hours  aspirin  chewable 81 milliGRAM(s) Oral daily  atorvastatin 40 milliGRAM(s) Oral at bedtime  budesonide 160 MICROgram(s)/formoterol 4.5 MICROgram(s) Inhaler 2 Puff(s) Inhalation two times a day  carvedilol 12.5 milliGRAM(s) Oral every 12 hours  cefepime   IVPB 2000 milliGRAM(s) IV Intermittent every 12 hours  chlorhexidine 0.12% Liquid 15 milliLiter(s) Oral Mucosa every 12 hours  dexAMETHasone  Injectable 6 milliGRAM(s) IV Push daily  dexMEDEtomidine Infusion 0.7 MICROgram(s)/kG/Hr (10.4 mL/Hr) IV Continuous <Continuous>  pantoprazole  Injectable 40 milliGRAM(s) IV Push daily  propofol Infusion 10 MICROgram(s)/kG/Min (3.56 mL/Hr) IV Continuous <Continuous>  remdesivir  IVPB 100 milliGRAM(s) IV Intermittent every 24 hours    MEDICATIONS  (PRN):  acetaminophen   Tablet .. 650 milliGRAM(s) Oral every 4 hours PRN Temp greater or equal to 38.5C (101.3F)  ALBUTerol    90 MICROgram(s) HFA Inhaler 2 Puff(s) Inhalation every 4 hours PRN Shortness of Breath and/or Wheezing  ondansetron Injectable 4 milliGRAM(s) IV Push every 6 hours PRN Nausea and/or Vomiting      ICU Vital Signs Last 24 Hrs  T(C): 37.7 (25 Mar 2021 12:00), Max: 38.6 (25 Mar 2021 05:00)  T(F): 99.8 (25 Mar 2021 12:00), Max: 101.5 (25 Mar 2021 05:00)  HR: 94 (25 Mar 2021 13:00) (83 - 119)  BP: 127/56 (25 Mar 2021 13:00) (103/61 - 189/128)  BP(mean): 73 (25 Mar 2021 13:00) (70 - 142)  RR: 37 (25 Mar 2021 13:00) (25 - 40)  SpO2: 96% (25 Mar 2021 13:00) (92% - 100%)                      12.0   26.06 )-----------( 513      ( 25 Mar 2021 06:40 )             38.2     `03-25    144  |  113<H>  |  58<H>  ----------------------------<  136<H>  4.5   |  25  |  1.05    Ca    8.0<L>      25 Mar 2021 06:40  Phos  2.7     03-25  Mg     2.4     03-25    TPro  6.1  /  Alb  1.8<L>  /  TBili  0.4  /  DBili  0.2  /  AST  118<H>  /  ALT  95<H>  /  AlkPhos  147<H>  03-25        CULTURES:  Culture Results:   No growth to date. (03-17 @ 13:27)  Culture Results:   No growth to date. (03-17 @ 12:38)  Culture Results:   No growth (03-17 @ 04:15)      Physical Examination:    General: Obtunded. Cachetic     HEENT: Pupils equal, reactive to light.  Symmetric.    PULM: Clear to auscultation bilaterally, no significant sputum production    CVS: Regular rate and rhythm, no murmurs, rubs, or gallops    ABD: Soft, nondistended, nontender, normoactive bowel sounds, liver edge palpated 2cm below costal ridge    EXT: RLE  below knee feels cold compared to Left lower extremity. Barely palpable Right popliteal pulse. Dorsalis pedis pulse. Nonpalpable dorsalis pedis pulse 0    SKIN: Sharp demarcation discoloration of entire right foot. Dry gangrene appearing in right foot. multiple bruising and increased skin turgor seen b/l foreaems

## 2021-03-26 NOTE — PROGRESS NOTE ADULT - SUBJECTIVE AND OBJECTIVE BOX
Date of service: 03-26-21 @ 10:06    Lying in bed in NAD  Intubated on ventilatory support  Febrile to 101.7F  Lethargic    ROS: unobtainable    MEDICATIONS  (STANDING):  apixaban 5 milliGRAM(s) Oral every 12 hours  aspirin  chewable 81 milliGRAM(s) Oral daily  budesonide 160 MICROgram(s)/formoterol 4.5 MICROgram(s) Inhaler 2 Puff(s) Inhalation two times a day  carvedilol 12.5 milliGRAM(s) Oral every 12 hours  cefepime   IVPB 2000 milliGRAM(s) IV Intermittent every 12 hours  chlorhexidine 0.12% Liquid 15 milliLiter(s) Oral Mucosa every 12 hours  dexAMETHasone  Injectable 6 milliGRAM(s) IV Push daily  enalapril 2.5 milliGRAM(s) Oral daily  pantoprazole  Injectable 40 milliGRAM(s) IV Push daily  polyethylene glycol 3350 17 Gram(s) Oral daily  propofol Infusion 10 MICROgram(s)/kG/Min (3.56 mL/Hr) IV Continuous <Continuous>  remdesivir  IVPB 100 milliGRAM(s) IV Intermittent every 24 hours    Vital Signs Last 24 Hrs  T(C): 37.6 (26 Mar 2021 08:00), Max: 38.7 (26 Mar 2021 06:00)  T(F): 99.6 (26 Mar 2021 08:00), Max: 101.7 (26 Mar 2021 06:00)  HR: 98 (26 Mar 2021 09:00) (82 - 108)  BP: 115/59 (26 Mar 2021 09:00) (110/57 - 137/64)  BP(mean): 71 (26 Mar 2021 09:00) (68 - 93)  RR: 34 (26 Mar 2021 09:00) (29 - 44)  SpO2: 93% (26 Mar 2021 09:00) (93% - 100%)  Mode: AC/ CMV (Assist Control/ Continuous Mandatory Ventilation), RR (machine): 24, TV (machine): 375, FiO2: 45, PEEP: 10, PIP: 20   Physical exam:    Constitutional:  No acute distress  HEENT: NC/AT, EOMI, PERRLA, conjunctivae clear  Neck: supple; thyroid not palpable  Back: no tenderness  Respiratory: respiratory effort normal; crackles at bases; few rhonchi  Cardiovascular: S1S2 regular, no murmurs  Abdomen: soft, not tender, not distended, positive BS  Genitourinary: no suprapubic tenderness  Lymphatic: no LN palpable  Musculoskeletal: no muscle tenderness, no joint swelling or tenderness  Extremities: no pedal edema  right foot dark, erythema discoloration; tender  Neurological/ Psychiatric: confused; moving all extremities  Skin: no rashes; no palpable lesions    Labs: reviewed                        12.4   24.61 )-----------( 476      ( 26 Mar 2021 07:45 )             38.3     03-26    143  |  112<H>  |  60<H>  ----------------------------<  113<H>  4.5   |  26  |  0.84    Ca    8.5      26 Mar 2021 07:45  Phos  2.9     03-26  Mg     2.7     03-26    TPro  6.2  /  Alb  1.9<L>  /  TBili  0.5  /  DBili  0.3<H>  /  AST  137<H>  /  ALT  102<H>  /  AlkPhos  154<H>  03-26    D-Dimer Assay, Quantitative: 93168 ng/mL DDU (03-22-21 @ 07:24)  C-Reactive Protein, Serum: 82 mg/L (03-22-21 @ 07:24)  C-Reactive Protein, Serum: 78 mg/L (03-22-21 @ 05:00)  D-Dimer Assay, Quantitative: 3122 ng/mL DDU (03-20-21 @ 07:08)  Ferritin, Serum: 624 ng/mL (03-20-21 @ 07:08)  C-Reactive Protein, Serum: 35 mg/L (03-20-21 @ 07:08)  D-Dimer Assay, Quantitative: 2000 ng/mL DDU (03-18-21 @ 17:14)  C-Reactive Protein, Serum: 273 mg/L (03-17-21 @ 12:38)  D-Dimer Assay, Quantitative: 2434 ng/mL DDU (03-17-21 @ 12:38)  Ferritin, Serum: 475 ng/mL (03-17-21 @ 12:38)                        12.1   24.66 )-----------( 557      ( 22 Mar 2021 07:24 )             37.7     03-22    147<H>  |  114<H>  |  53<H>  ----------------------------<  115<H>  4.1   |  26  |  1.09    Ca    8.7      22 Mar 2021 07:24  Phos  4.2     03-22  Mg     2.7     03-22    TPro  6.9  /  Alb  2.1<L>  /  TBili  0.7  /  DBili  x   /  AST  67<H>  /  ALT  68  /  AlkPhos  132<H>  03-22                        11.5   12.36 )-----------( 486      ( 18 Mar 2021 09:04 )             34.2     03-19    x   |  x   |  x   ----------------------------<  x   x    |  x   |  0.91    Ca    9.4      18 Mar 2021 09:04    TPro  7.3  /  Alb  2.1<L>  /  TBili  0.4  /  DBili  0.2  /  AST  54<H>  /  ALT  59  /  AlkPhos  121<H>  03-19      D-Dimer Assay, Quantitative: 2000 ng/mL DDU (03-18-21 @ 17:14)  C-Reactive Protein, Serum: 273 mg/L (03-17-21 @ 12:38)  D-Dimer Assay, Quantitative: 2434 ng/mL DDU (03-17-21 @ 12:38)  Ferritin, Serum: 475 ng/mL (03-17-21 @ 12:38)      Culture - Blood (collected 17 Mar 2021 13:27)  Source: .Blood None  Preliminary Report (18 Mar 2021 19:02):    No growth to date.    Culture - Blood (collected 17 Mar 2021 12:38)  Source: .Blood Blood-Peripheral  Preliminary Report (18 Mar 2021 17:02):    No growth to date.    Culture - Urine (collected 17 Mar 2021 04:15)  Source: .Urine Clean Catch (Midstream)  Final Report (19 Mar 2021 09:57):    No growth    COVID-19 PCR: Detected (03-17-21 @ 14:45)    Radiology: all available radiological tests reviewed    < from: CT Angio Chest PE Protocol w/ IV Cont (03.17.21 @ 14:20) >  Bilateral infiltrates consistent with COVID 19 pneumonia.  No evidence of pulmonary embolus.  < end of copied text >      Advanced directives addressed: full resuscitation

## 2021-03-26 NOTE — PROGRESS NOTE ADULT - ASSESSMENT
IMP:    79 y/o male with COVID, hepatitis C, liver dysfunction, chronic HFrEF (35%) and CKD stage III found to have evolving B/L frontal and L parietal CVA and new ischemic R CN CVA resulting in unresponsiveness and acute type 1 resp failure-- Encephalopathy   Acute ischemic RLE--stent thrombosis--while on AC  Viral PNA sepsis secondary to COVID--Acute type 1 resp failure   AFib on DOAC    Critically ill.  High risk for acute decompensation and deterioration including death.  Very poor outlook   Patient requires critical care for support of one or more vital organ systems with a high probability of imminent or life threatening deterioration in his/her condition.  Grave outlook     Plan:    Full vent support--LPV strategy to maintain plateau pressures < 30--High PEEP/low TV--Permissive hypercapnea and acidemia.  Decrease FIO2.  PAP < 20  Cont with propofol gtt.  Cont lorazepam PRN  Cont with DOAC.  Pt not surgical candidate   Cont with Rem/Dexa (10). Cefepime (4) as the limb will become septic source  DC statin  Add ACEI and cont ASA and BBx  Cont to trend LFT  TF   HOB > 30  DVT prophy--AC    ICU care--d/w ICU staff on multi disciplinary rounds and brother Xavier  updated him on pt's condition.  All concerns addressed including but not limited to diagnosis, treatment plan and overall prognosis

## 2021-03-26 NOTE — PROGRESS NOTE ADULT - ASSESSMENT
81 y/o male with h/o chronic hepatitis C, liver dysfunction, chronic CHF, CKD stage III, recently diagnosed with COVID-19 viral syndrome was admitted on 3/17 for worsening SOB.  Pt tested COVID + on 3/7/21. The pt is a poor historian at baseline. Pt's O2 at home in 80s prompting him to come to ED. In the ER, sats 89% on RA.  Patient started on 5L NC and sats improved to upper 90's.     1. COVID-19 viral syndrome. Acute respiratory failure. Multifocal pneumonia. Right foot ischemia ?underlying cellulitis.   -febrile  -respiratory frail  -encephalopathy  -leukocytosis is persistent  -on remdesivir protocol # 9  -on cefepime 2 gm IV q12h # 4  -tolerating abx well so far; no side effects noted  -AC  -steroids  -respiratory care  -droplet isolation  -continue  antimicrobial therapy  -prognosis is poor  -monitor temps  -f/u CBC  -supportive care  2. Other issues:   -care per medicine    Case d/w Dr. Giles

## 2021-03-26 NOTE — PROGRESS NOTE ADULT - SUBJECTIVE AND OBJECTIVE BOX
Hospital D # 9  ICU # 5  Vent # 5    CC:  Respiratory Failure     HPI:    79 y/o male with hepatitis C, liver dysfunction, chronic HFrEF (35%) and CKD stage III who presents to the ED c/o SOB.  Pt tested COVID + on 3/7/21.  As per PMD, patient's girlfriend also has COVID.  PMD reports pt is a poor historian at baseline.  Pt's O2 at home in 80s prompting him to come to ED.  In the ER, sats 89% on RA.  Patient started on 5L NC and sats improved to upper 90's.  WBC elevated @ 13.  DDimer high 2434.  CTA negative for PE but positive for bilateral ground glass opacities c/w COVID-19  Hospital course complicated by worsening mentation.  Brain MR revealed L watershed infarcts and PAFib started on AC    3/22:  Case d/w ALVARO Brar.  Pt seen and examined in ICU.  RRT O/N for obtundation.  Intubated.  HCT reveal Evolving prior B/L frontal and L CVA and new R CN CVA . Vented on 90/8.  Not responsive off sedation.  Poor [place]     3/23:  Case d/w ALVARO Brar.  Acute ischemic RLE.  Cool.  Leukocytosis.  Tm 100.9  Not awake off sedation. Doing very poorly    3/24:  Vented and sedated.  Dannie fever O/N.  CK 1200.  R ankle with delineation of ischemia.   No pressors yet    3/25:  Vented and unresponsive off sedation. CK stable.  No pressor.  Febrile.  Brother at bedside on 3/24--condition reviewed in detail    3/26:  Vented and unresponsive off sedation.  R foot unchanged.  Febrile.  No pressors yet.      PMH:  As above.     PSH:  As above.     FH: Non Contributory other than those listed in HPI    Social History:  Unobtainable due to clinical condition     MEDICATIONS  (STANDING):  apixaban 5 milliGRAM(s) Oral every 12 hours  aspirin  chewable 81 milliGRAM(s) Oral daily  budesonide 160 MICROgram(s)/formoterol 4.5 MICROgram(s) Inhaler 2 Puff(s) Inhalation two times a day  carvedilol 12.5 milliGRAM(s) Oral every 12 hours  cefepime   IVPB 2000 milliGRAM(s) IV Intermittent every 12 hours  chlorhexidine 0.12% Liquid 15 milliLiter(s) Oral Mucosa every 12 hours  dexAMETHasone  Injectable 6 milliGRAM(s) IV Push daily  enalapril 2.5 milliGRAM(s) Oral daily  pantoprazole  Injectable 40 milliGRAM(s) IV Push daily  polyethylene glycol 3350 17 Gram(s) Oral daily  propofol Infusion 10 MICROgram(s)/kG/Min (3.56 mL/Hr) IV Continuous <Continuous>  remdesivir  IVPB 100 milliGRAM(s) IV Intermittent every 24 hours    MEDICATIONS  (PRN):  acetaminophen   Tablet .. 650 milliGRAM(s) Oral every 4 hours PRN Temp greater or equal to 38.5C (101.3F)  ALBUTerol    90 MICROgram(s) HFA Inhaler 2 Puff(s) Inhalation every 4 hours PRN Shortness of Breath and/or Wheezing  LORazepam   Injectable 2 milliGRAM(s) IV Push every 4 hours PRN Respiratory Distress  ondansetron Injectable 4 milliGRAM(s) IV Push every 6 hours PRN Nausea and/or Vomiting  senna 2 Tablet(s) Oral at bedtime PRN Constipation      Allergies: NKDA    ROS:  SEE BELOW        ICU Vital Signs Last 24 Hrs  T(C): 37.6 (26 Mar 2021 10:00), Max: 38.7 (26 Mar 2021 06:00)  T(F): 99.7 (26 Mar 2021 10:00), Max: 101.7 (26 Mar 2021 06:00)  HR: 100 (26 Mar 2021 10:00) (82 - 108)  BP: 119/64 (26 Mar 2021 10:00) (110/57 - 137/64)  BP(mean): 78 (26 Mar 2021 10:00) (68 - 93)  ABP: --  ABP(mean): --  RR: 34 (26 Mar 2021 10:00) (29 - 44)  SpO2: 94% (26 Mar 2021 10:00) (93% - 100%)      Mode: AC/ CMV (Assist Control/ Continuous Mandatory Ventilation)  RR (machine): 24  TV (machine): 375  FiO2: 45  PEEP: 10  PIP: 20      I&O's Summary    25 Mar 2021 07:01  -  26 Mar 2021 07:00  --------------------------------------------------------  IN: 2220 mL / OUT: 875 mL / NET: 1345 mL    26 Mar 2021 07:01  -  26 Mar 2021 10:46  --------------------------------------------------------  IN: 50 mL / OUT: 0 mL / NET: 50 mL        Physical Exam:  SEE BELOW                          12.4   24.61 )-----------( 476      ( 26 Mar 2021 07:45 )             38.3       03-26    143  |  112<H>  |  60<H>  ----------------------------<  113<H>  4.5   |  26  |  0.84    Ca    8.5      26 Mar 2021 07:45  Phos  2.9     03-26  Mg     2.7     03-26    TPro  6.2  /  Alb  1.9<L>  /  TBili  0.5  /  DBili  0.3<H>  /  AST  137<H>  /  ALT  102<H>  /  AlkPhos  154<H>  03-26      CARDIAC MARKERS ( 26 Mar 2021 07:45 )  x     / x     / 1329 U/L / x     / x      CARDIAC MARKERS ( 25 Mar 2021 06:40 )  x     / x     / 1361 U/L / x     / x                    DVT Prophylaxis:                                                            Contraindication:     Advanced Directives:    Discussed with:    Visit Information:  Time spent excluding procedure:      ** Time is exclusive of billed procedures and/or teaching and/or routine family updates.

## 2021-03-27 NOTE — PROGRESS NOTE ADULT - ASSESSMENT
IMP:    79 y/o male with COVID, hepatitis C, liver dysfunction, chronic HFrEF (35%) and CKD stage III found to have evolving B/L frontal and L parietal CVA and new ischemic R CN CVA resulting in unresponsiveness and acute type 1 resp failure-- Encephalopathy   Acute ischemic R ankle/foot--stent thrombosis--while on AC  Viral PNA sepsis secondary to COVID--Acute type 1 resp failure   AFib on DOAC    Critically ill.  High risk for acute decompensation and deterioration including death.  Very poor outlook   Patient requires critical care for support of one or more vital organ systems with a high probability of imminent or life threatening deterioration in his/her condition.  Grave outlook     Plan:    Full vent support--LPV strategy to maintain plateau pressures < 30--High PEEP/low TV--Permissive hypercapnea and acidemia.  Decrease FIO2.  PAP < 20  Midazolam gtt. Cont lorazepam PRN.  Follow Up Trig level today  Cont with DOAC.  Pt not surgical candidate   Cont with Rem/Dexa (10)--completed today. Cefepime (5) as the limb will become septic source  DC statin  Add ACEI and cont ASA and BBx--increase BBx to 25 bid  Cont to trend LFT  TF   HOB > 30  DVT prophy--AC    ICU care--d/w ICU staff on multi disciplinary rounds and brother Xavier  updated him on pt's condition.  All concerns addressed including but not limited to diagnosis, treatment plan and overall prognosis

## 2021-03-27 NOTE — PROGRESS NOTE ADULT - SUBJECTIVE AND OBJECTIVE BOX
Hospital D # 10  ICU # 6  Vent # 6    CC:  Respiratory Failure     HPI:    79 y/o male with hepatitis C, liver dysfunction, chronic HFrEF (35%) and CKD stage III who presents to the ED c/o SOB.  Pt tested COVID + on 3/7/21.  As per PMD, patient's girlfriend also has COVID.  PMD reports pt is a poor historian at baseline.  Pt's O2 at home in 80s prompting him to come to ED.  In the ER, sats 89% on RA.  Patient started on 5L NC and sats improved to upper 90's.  WBC elevated @ 13.  DDimer high 2434.  CTA negative for PE but positive for bilateral ground glass opacities c/w COVID-19  Hospital course complicated by worsening mentation.  Brain MR revealed L watershed infarcts and PAFib started on AC    3/22:  Case d/w ALVARO Brar.  Pt seen and examined in ICU.  RRT O/N for obtundation.  Intubated.  HCT reveal Evolving prior B/L frontal and L CVA and new R CN CVA . Vented on 90/8.  Not responsive off sedation.  Poor [place]     3/23:  Case d/w ALVARO Brar.  Acute ischemic RLE.  Cool.  Leukocytosis.  Tm 100.9  Not awake off sedation. Doing very poorly    3/24:  Vented and sedated.  Dannie fever O/N.  CK 1200.  R ankle with delineation of ischemia.   No pressors yet    3/25:  Vented and unresponsive off sedation. CK stable.  No pressor.  Febrile.  Brother at bedside on 3/24--condition reviewed in detail    3/26:  Vented and unresponsive off sedation.  R foot unchanged.  Febrile.  No pressors yet.      3/27:  Vented and unresponsive.  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  On Midazolam gtt due to elevated Trig level.  R foot looks worse today.  No pressors.  Febrile 101.7    PMH:  As above.     PSH:  As above.     FH: Non Contributory other than those listed in HPI    Social History:  Unobtainable due to clinical condition     MEDICATIONS  (STANDING):  apixaban 5 milliGRAM(s) Oral every 12 hours  aspirin  chewable 81 milliGRAM(s) Oral daily  budesonide 160 MICROgram(s)/formoterol 4.5 MICROgram(s) Inhaler 2 Puff(s) Inhalation two times a day  carvedilol 25 milliGRAM(s) Oral every 12 hours  cefepime   IVPB 2000 milliGRAM(s) IV Intermittent every 12 hours  chlorhexidine 0.12% Liquid 15 milliLiter(s) Oral Mucosa every 12 hours  enalapril 2.5 milliGRAM(s) Oral daily  midazolam Infusion 0.02 mG/kG/Hr (1.19 mL/Hr) IV Continuous <Continuous>  pantoprazole  Injectable 40 milliGRAM(s) IV Push every 12 hours  polyethylene glycol 3350 17 Gram(s) Oral daily  remdesivir  IVPB 100 milliGRAM(s) IV Intermittent every 24 hours    MEDICATIONS  (PRN):  acetaminophen   Tablet .. 650 milliGRAM(s) Oral every 4 hours PRN Temp greater or equal to 38.5C (101.3F)  ALBUTerol    90 MICROgram(s) HFA Inhaler 2 Puff(s) Inhalation every 4 hours PRN Shortness of Breath and/or Wheezing  fentaNYL    Injectable 50 MICROGram(s) IV Push every 2 hours PRN vent sedation  LORazepam   Injectable 2 milliGRAM(s) IV Push every 4 hours PRN Respiratory Distress  ondansetron Injectable 4 milliGRAM(s) IV Push every 6 hours PRN Nausea and/or Vomiting  senna 2 Tablet(s) Oral at bedtime PRN Constipation      Allergies: NKDA    ROS:  SEE BELOW        ICU Vital Signs Last 24 Hrs  T(C): 37.6 (27 Mar 2021 08:00), Max: 38.4 (26 Mar 2021 19:00)  T(F): 99.7 (27 Mar 2021 08:00), Max: 101.2 (26 Mar 2021 19:00)  HR: 92 (27 Mar 2021 09:00) (88 - 150)  BP: 132/72 (27 Mar 2021 09:00) (103/56 - 181/84)  BP(mean): 87 (27 Mar 2021 09:00) (66 - 108)  ABP: --  ABP(mean): --  RR: 35 (27 Mar 2021 09:00) (32 - 38)  SpO2: 96% (27 Mar 2021 09:00) (92% - 97%)      Mode: AC/ CMV (Assist Control/ Continuous Mandatory Ventilation)  RR (machine): 24  TV (machine): 375  FiO2: 45  PEEP: 10  ITime: 1  PIP: 25      I&O's Summary    26 Mar 2021 07:01  -  27 Mar 2021 07:00  --------------------------------------------------------  IN: 2533 mL / OUT: 1540 mL / NET: 993 mL        Physical Exam:  SEE BELOW                          11.4   27.08 )-----------( 461      ( 27 Mar 2021 06:00 )             35.4       03-27    140  |  109<H>  |  61<H>  ----------------------------<  102<H>  4.0   |  26  |  0.80    Ca    8.4<L>      27 Mar 2021 06:00  Phos  3.1     03-27  Mg     2.5     03-27    TPro  5.8<L>  /  Alb  1.8<L>  /  TBili  0.4  /  DBili  0.2  /  AST  120<H>  /  ALT  98<H>  /  AlkPhos  160<H>  03-27      CARDIAC MARKERS ( 27 Mar 2021 06:00 )  x     / x     / 1024 U/L / x     / x      CARDIAC MARKERS ( 26 Mar 2021 07:45 )  x     / x     / 1329 U/L / x     / x            ABG - ( 27 Mar 2021 06:02 )  pH, Arterial: 7.39  pH, Blood: x     /  pCO2: 42    /  pO2: 84    / HCO3: 25    / Base Excess: .4    /  SaO2: 96                      DVT Prophylaxis:                                                            Contraindication:     Advanced Directives:    Discussed with:    Visit Information:  Time spent excluding procedure:      ** Time is exclusive of billed procedures and/or teaching and/or routine family updates.

## 2021-03-28 NOTE — PROGRESS NOTE ADULT - MS EXT PE MLT D E PC
Discolored R ankle/no cyanosis/no pedal edema
no cyanosis/no pedal edema
R ankle blueish and cool/no pedal edema
R ankle discolored/no cyanosis
R foot discolored stable/no pedal edema
Cool R knee and below
R foot blue and cold/no pedal edema

## 2021-03-28 NOTE — PROGRESS NOTE ADULT - ASSESSMENT
IMP:    81 y/o male with COVID, hepatitis C, liver dysfunction, chronic HFrEF (35%) and CKD stage III found to have evolving B/L frontal and L parietal CVA and new ischemic R CN CVA resulting in unresponsiveness and acute type 1 resp failure-- Encephalopathy and not improving  Acute ischemic RLE--stent thrombosis--while on AC  Viral PNA sepsis secondary to COVID--Acute type 1 resp failure   AFib on DOAC    Critically ill.  High risk for acute decompensation and deterioration including death.  Very poor outlook   Patient requires critical care for support of one or more vital organ systems with a high probability of imminent or life threatening deterioration in his/her condition.  Grave outlook     Plan:    Full vent support--LPV strategy to maintain plateau pressures < 30--High PEEP/low TV--Permissive hypercapnea and acidemia.  Decrease FIO2.  PAP < 20  Cont with Midazolam gtt  Cont with DOAC.  Pt not surgical candidate   Completed Rem/Dexa (10). Cefepime (5) for dead R foot  DC statin  Cont ACEI and ASA and BBx  TF   HOB > 30  DVT prophy--AC    ICU care--d/w ICU staff on multi disciplinary rounds and brother Xavier  updated him on pt's condition.  All concerns addressed including but not limited to diagnosis, treatment plan and overall prognosis.      Have been attempting to get Xavier to see the big picture but he is "not giving up"

## 2021-03-28 NOTE — CHART NOTE - NSCHARTNOTEFT_GEN_A_CORE
Spoke with brother Xavier updated him on pt's current condition and Rx plan.  Suggested to him strongly to seriously consider pivoting to comfort care.  Still wants to give him every chance possible.  All concerns addressed including but not limited to diagnosis, treatment plan and overall prognosis

## 2021-03-28 NOTE — PROGRESS NOTE ADULT - RS GEN PE MLT RESP DETAILS PC
breath sounds equal

## 2021-03-29 NOTE — PROGRESS NOTE ADULT - SUBJECTIVE AND OBJECTIVE BOX
Neurosurgery:    81 y/o male with hepatitis C, liver dysfunction, chronic HFrEF (35%) and CKD stage III who presents to the ED c/o SOB.  Pt tested COVID + on 3/7/21.  As per PMD, patient's girlfriend.   Pt's O2 at home in 80s prompting him to come to ED, bilateral ground glass opacities c/w COVID-19.  Hospital course complicated by worsening mentation.  Brain MR revealed L watershed infarcts and PAFib started on AC.    3/22:    pt Intubated.  HCT reveal Evolving prior B/L frontal and L CVA and new R CN CVA . Vented.  Not responsive off sedation.  Poor [place]   3/23:    Acute ischemic RLE.  Cool.  Leukocytosis.  Tm 100.9  Not awake off sedation. Doing very poorly  3/29     Vented.  Unresponsive off sedation.  Pupils unequal    Radiology 3/29/21:  FINDINGS: CTH f/u  Progressive bilateral anterior and middle cerebral territory infarcts with decreased attenuation, and   loss of   gray-white distinction, edema, and mass effect with effacement of  sulci, lateral and third ventricles, suprasellar and basal cisterns with predominant involvement  bifrontal, anterior temporal,and biparietal lobes including the bilateral lentiform nuclei and thalami. Additional  nonspecific white matter decreased attenuation likely microvascular disease. There is abnormal increased attenuation  in bilateral M1 and A1, middle and anterior cerebral arteries (5:10-11) and atherosclerotic calcification of the carotid siphons. There is effacement of the basal cisterns.    A/P:  Review of Latest CTH: Global hemispherice insult infarcts to both cerebral territories including both CHENG and MCA on the right and left. M1 and beyond involved.  No meaningful recovery can be expected from such a devastating cerebral insult to both hemispheres.  Present exam and imaging studies are not c/w and ventilator independent life.  d/w ICU team - no role for hemicraniectomy decompression.  Please call with any questions.

## 2021-03-29 NOTE — PROGRESS NOTE ADULT - ASSESSMENT
`Diagnosis: Right sided weakness 2/2 multiple CVA.     Acute Metabolic Encephalopathy     Type 1 respiratory Failure 2/2 viral covid PNA      Neurologic:   Metabolic encephalopathy 2/2 multiple CVA   Still unresponsive. 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 Poor prognosis  MR Head(3/18)-Acute predominantly left-sided watershed distribution infarcts  CT head (3/21) Evolving infarcts in both frontal and left parietal lobes without significant hemorrhagic conversion. Shows worsening ischemic CVAs and new ischemia area in caudate nucleus.   CT Head(3/29)Bilateral CHENG and MCA infarcts, edema, mass effect, loss of gray-white distinction in bilateral cerebral hemispheres with sulcal and cisternal effacement.  -COnt. eliquis 5mg Qd        Respiratory:   Type 1 respiratory Failure 2/2 viral covid PNA:    Fio2 to 50% Peep 10  Worsening Dyspnea  Continue ativan PRN for respiratory distress  COvid Positive 3/17. Viral Covid inflammatory markers elevated. S/P 10 day course  remdesivir and decadron 6mg      Cardiovascular:   CHF w/ REF- EF 30-35%. Currently compensated  -Cont carvedilol  -New Onset Paroxy Afib-Chadvasc 5. On Eliquis. Rate is controlled. PRN Cardizem or Lopressor if patient reverts to RVR.    -`Doppler venous US(3/22) No present DVT.  - Carotid Artery US(3/22) 70% occlussion of Right Carotid bulb. 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   GI:   Hx of Hepatitis B. Ammonium 27 on admission      ID:  -Persistent Fevers   - Empiric IV abx with Cefepime day 8. Septic foot  - Finished Remdesivir and Decadron for full course.  -NGTD 3/23 Blood Cultures  -Blood Cultures 3/17 NGTD    :   Monitor I/O's     Hematologic:   Hbg Stable        Dispo: Continue goals of care conversation.

## 2021-03-29 NOTE — PROGRESS NOTE ADULT - SUBJECTIVE AND OBJECTIVE BOX
Events Overnight: Patient with change of pupils, ct of head done which shows severe blilateral extensive, cerebral infarcts                            on 10 PEEP, Right leg is cyanotic and cool. not on  pressors    HPI:       79 y/o male with hepatitis C, liver dysfunction, chronic HFrEF (35%) and CKD stage III who presents to the ED c/o SOB.  Pt tested COVID + on 3/7/21.  As per PMD, patient's girlfriend    Pt's O2 at home in 80s prompting him to come to ED.  In the ER, sats 89% on RA.  Patient started on 5L NC and sats improved to upper 90's.  WBC elevated @ 13.  DDimer high 2434.  CTA negative for PE but positive for bilateral ground glass opacities c/w COVID-19  Hospital course complicated by worsening mentation.  Brain MR revealed L watershed infarcts and PAFib started on AC  carotids Duplex  had shown, 70 % stenosis on right, 50% on left    3/22:    Intubated.  HCT reveal Evolving prior B/L frontal and L CVA and new R CN CVA . Vented on 90/8.  Not responsive off sedation.  Poor [place]   3/23:    Acute ischemic RLE.  Cool.  Leukocytosis.  Tm 100.9  Not awake off sedation. Doing very poorly  3/29   Vented.  Unresponsive off sedation.  Pupils unequal      MEDICATIONS  (STANDING):  apixaban 5 milliGRAM(s) Oral every 12 hours  ascorbic acid 500 milliGRAM(s) Oral daily  aspirin  chewable 81 milliGRAM(s) Oral daily  budesonide 160 MICROgram(s)/formoterol 4.5 MICROgram(s) Inhaler 2 Puff(s) Inhalation two times a day  carvedilol 25 milliGRAM(s) Oral every 12 hours  cefepime   IVPB 2000 milliGRAM(s) IV Intermittent every 12 hours  chlorhexidine 0.12% Liquid 15 milliLiter(s) Oral Mucosa every 12 hours  doxazosin 2 milliGRAM(s) Oral at bedtime  enalapril 2.5 milliGRAM(s) Oral daily  midazolam Infusion 0.02 mG/kG/Hr (1.19 mL/Hr) IV Continuous <Continuous>  pantoprazole  Injectable 40 milliGRAM(s) IV Push every 12 hours  polyethylene glycol 3350 17 Gram(s) Oral daily  zinc sulfate 220 milliGRAM(s) Oral daily    MEDICATIONS  (PRN):  acetaminophen   Tablet .. 650 milliGRAM(s) Oral every 4 hours PRN Temp greater or equal to 38.5C (101.3F)  ALBUTerol    90 MICROgram(s) HFA Inhaler 2 Puff(s) Inhalation every 4 hours PRN Shortness of Breath and/or Wheezing  fentaNYL    Injectable 50 MICROGram(s) IV Push every 2 hours PRN vent sedation  ondansetron Injectable 4 milliGRAM(s) IV Push every 6 hours PRN Nausea and/or Vomiting  senna 2 Tablet(s) Oral at bedtime PRN Constipation    ICU Vital Signs Last 24 Hrs  T(C): 37.1 (29 Mar 2021 04:00), Max: 38.4 (28 Mar 2021 18:00)  T(F): 98.8 (29 Mar 2021 04:00), Max: 101.1 (28 Mar 2021 18:00)  HR: 79 (29 Mar 2021 07:00) (78 - 115)  BP: 112/74 (29 Mar 2021 07:00) (87/55 - 142/96)  BP(mean): 81 (29 Mar 2021 07:00) (59 - 103)  ABP: --  ABP(mean): --  RR: 38 (29 Mar 2021 07:00) (24 - 43)  SpO2: 94% (29 Mar 2021 07:00) (91% - 100%)      Mode: AC/ CMV (Assist Control/ Continuous Mandatory Ventilation)  RR (machine): 24  TV (machine): 375  FiO2: 40  PEEP: 10  MAP: 19  PIP: 30      I&O's Summary    28 Mar 2021 07:01  -  29 Mar 2021 07:00  --------------------------------------------------------  IN: 2036 mL / OUT: 1275 mL / NET: 761 mL                          11.9   39.10 )-----------( 453      ( 29 Mar 2021 06:56 )             36.2       03-29    138  |  109<H>  |  63<H>  ----------------------------<  118<H>  4.5   |  25  |  0.75    Ca    8.9      29 Mar 2021 06:56  Phos  3.6     03-29  Mg     2.6     03-29    TPro  6.1  /  Alb  1.9<L>  /  TBili  0.4  /  DBili  0.2  /  AST  129<H>  /  ALT  98<H>  /  AlkPhos  145<H>  03-28      DVT Prophylaxis:  Apixaban

## 2021-03-29 NOTE — PROGRESS NOTE ADULT - ASSESSMENT
79 y/o male with COVID, hepatitis C, liver dysfunction, chronic HFrEF (35%) and CKD stage III found to have evolving B/L frontal and L parietal CVA and new ischemic R CN CVA resulting in unresponsiveness and acute type 1 resp failure--    Acute ischemic RLE--stent thrombosis--while on AC, afib on DOAC  Now severe bilateral cerebral infarcts, massive, with edema, may progress to brain death  Viral PNA sepsis secondary to COVID--Acute type 1 resp failure   AFib on DOAC         Plan:    Full vent support--LPV strategy to maintain plateau pressures < 30--High PEEP/low TV--Permissive hypercapnea and acidemia.  Decrease FIO2.  PAP < 20  will attempt to d/c midazaloam drip  Cont with DOAC.     Completed Rem/Dexa (10). Cefepime (6) for dead R foot, would need eventual amputation  DC statin  Cont ACEI and ASA and BBx  TF   HOB > 30  DVT prophy--AC    ICU care--d/w ICU staff on multi disciplinary rounds and brother Xavier  updated him on pt's condition.  All concerns addressed including but not limited to diagnosis, treatment plan and overall prognosis.

## 2021-03-29 NOTE — PROGRESS NOTE ADULT - ASSESSMENT
81 y/o male with h/o chronic hepatitis C, liver dysfunction, chronic CHF, CKD stage III, recently diagnosed with COVID-19 viral syndrome was admitted on 3/17 for worsening SOB.  Pt tested COVID + on 3/7/21. The pt is a poor historian at baseline. Pt's O2 at home in 80s prompting him to come to ED. In the ER, sats 89% on RA.  Patient started on 5L NC and sats improved to upper 90's.     1. COVID-19 viral syndrome. Acute respiratory failure. Multifocal pneumonia. Right foot ischemia ?underlying cellulitis.   -febrile  -respiratory frail  -encephalopathy  -leukocytosis is persistent  -s/p remdesivir protocol # 10  -on cefepime 2 gm IV q12h # 7  -tolerating abx well so far; no side effects noted  -AC  -steroids  -respiratory care  -droplet isolation  -continue  antimicrobial therapy  -prognosis is poor  -monitor temps  -f/u CBC  -supportive care  2. Other issues:   -care per medicine    Case d/w Dr. Saldaña

## 2021-03-29 NOTE — PROGRESS NOTE ADULT - SUBJECTIVE AND OBJECTIVE BOX
H&P: 81 y/o male with a PMHx of hepatitis C, liver dysfunction, chronic CHF, CKD stage III who presents to the ED c/o SOB.  Pt tested COVID + on 3/7/21.  As per PMD, patient's girlfriend also has COVID.  PMD reports pt is a poor historian at baseline.  Pt's O2 at home in 80s prompting him to come to ED.  In the ER, sats 89% on RA.  Patient started on 5L NC and sats improved to upper 90's.  WBC elevated @ 13.  DDimer high 2434.  CTA negative for PE but positive for bilateral ground glass opacities c/w COVID-19.  Patient received Decadron 6 mg IV x1 in the ER and admitted.      Hospital Course:  Patient is a 80y old  Male who presents with a chief complaint of dyspnea ans right sided weakness on admission but did not recieve TPA due to being outside the therapeutic window. 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 Patient continue to have worsening mental status throughout this hospitalization and was transferred to ICU 3/21 when patient was  found to be obtunded and was intubated.  Course complicated by multiple infarcts including CVA and femoral arterial thrombosis. Follow up CT scan 3/29 for diminished neurologic defects(pupils and gag) revealed Bilateral CHENG and MCA infarcts, edema, mass effect, loss of gray-white distinction in bilateral cerebral hemispheres with sulcal and cisternal effacement.        Notable Hx  He has an allergy to contrast as per his brother. He had respiratory arrest and kidney failure in the past after receiving contrast.   He also has a history of heparin induced thrombocytopenia.  He had angioplasty in his left leg and a stent in his right leg.      PAST MEDICAL & SURGICAL HISTORY:  Liver dysfunction    CKD (chronic kidney disease)    CHF (congestive heart failure)    Hepatitis C        Review of Systems:  Could not obtain      `MEDICATIONS  (STANDING):  apixaban 5 milliGRAM(s) Oral every 12 hours  ascorbic acid 500 milliGRAM(s) Oral daily  aspirin  chewable 81 milliGRAM(s) Oral daily  budesonide 160 MICROgram(s)/formoterol 4.5 MICROgram(s) Inhaler 2 Puff(s) Inhalation two times a day  carvedilol 25 milliGRAM(s) Oral every 12 hours  cefepime   IVPB 2000 milliGRAM(s) IV Intermittent every 12 hours  chlorhexidine 0.12% Liquid 15 milliLiter(s) Oral Mucosa every 12 hours  doxazosin 2 milliGRAM(s) Oral at bedtime  enalapril 2.5 milliGRAM(s) Oral daily  midazolam Infusion 0.02 mG/kG/Hr (1.19 mL/Hr) IV Continuous <Continuous>  pantoprazole  Injectable 40 milliGRAM(s) IV Push every 12 hours  polyethylene glycol 3350 17 Gram(s) Oral daily  zinc sulfate 220 milliGRAM(s) Oral daily    MEDICATIONS  (PRN):  acetaminophen   Tablet .. 650 milliGRAM(s) Oral every 4 hours PRN Temp greater or equal to 38.5C (101.3F)  ALBUTerol    90 MICROgram(s) HFA Inhaler 2 Puff(s) Inhalation every 4 hours PRN Shortness of Breath and/or Wheezing  fentaNYL    Injectable 50 MICROGram(s) IV Push every 2 hours PRN vent sedation  ondansetron Injectable 4 milliGRAM(s) IV Push every 6 hours PRN Nausea and/or Vomiting  senna 2 Tablet(s) Oral at bedtime PRN Constipation    ICU Vital Signs Last 24 Hrs  T(C): 37.1 (29 Mar 2021 04:00), Max: 38.4 (28 Mar 2021 18:00)  T(F): 98.8 (29 Mar 2021 04:00), Max: 101.1 (28 Mar 2021 18:00)  HR: 86 (29 Mar 2021 08:38) (78 - 115)  BP: 104/53 (29 Mar 2021 08:00) (87/55 - 142/96)  BP(mean): 65 (29 Mar 2021 08:00) (59 - 103)  ABP: --  ABP(mean): --  RR: 32 (29 Mar 2021 08:00) (24 - 43)  SpO2: 92% (29 Mar 2021 08:38) (91% - 100%)    `03-29    138  |  109<H>  |  63<H>  ----------------------------<  118<H>  4.5   |  25  |  0.75    Ca    8.9      29 Mar 2021 06:56  Phos  3.6     03-29  Mg     2.6     03-29    TPro  6.1  /  Alb  1.9<L>  /  TBili  0.4  /  DBili  0.2  /  AST  129<H>  /  ALT  98<H>  /  AlkPhos  145<H>  03-28                          11.9   39.10 )-----------( 453      ( 29 Mar 2021 06:56 )             36.2         Physical Examination:    General: Obtunded. Cachetic     HEENT: Pupils nonreactive to light.  Symmetric.    PULM: Diminished to auscultation bilaterally, no significant sputum production    CVS: Regular rate and rhythm, no murmurs, rubs, or gallops    ABD: Soft, nondistended, nontender, normoactive bowel sounds, liver edge palpated 2cm below costal ridge    EXT: RLE  below knee feels cold compared to Left lower extremity. Barely palpable Right popliteal pulse. Nonpalpable dorsalis pedis pulse 0    SKIN: Sharp demarcation purple discoloration of entire right foot. Dry gangrene appearing in right foot. multiple bruising and increased skin turgor seen b/l foreaems

## 2021-03-29 NOTE — PROGRESS NOTE ADULT - SUBJECTIVE AND OBJECTIVE BOX
Patient is a 80y old  Male who presents with a chief complaint of acute bilateral ICA CVA's / global cytotoxic edema (29 Mar 2021 19:38)      BRIEF HOSPITAL COURSE: 80y Male pmhx Hepatitis C, CKD3, HFrEF (35%) who presented with SOB. Admitted with COVID-19 viral pneumonia. Hospital course complicated by right sided weakness with new watershed CVA. RRt called for worsening metnal status and patient subsequently intubated for poor mental status. Hospital course further complicated by new evolvin CVA, hypoxic respiratory failure, and RLE limb ischemia    Events last 24 hours: This AM patient noted to have poor pupillary response. CTH demonstrated B/L CHENG infarcts with mass effect, cerebral edema, and loss of gray-white matter differentiation. At bedside patient is unresponsive on ventilator.    PAST MEDICAL & SURGICAL HISTORY:  Liver dysfunction    CKD (chronic kidney disease)    CHF (congestive heart failure)    Hepatitis C          Hosp day #12d    Vent day #  Mode: AC/ CMV (Assist Control/ Continuous Mandatory Ventilation)  RR (machine): 24  TV (machine): 375  FiO2: 40  PEEP: 10  ITime: 1  MAP: 15  PIP: 35        Vital signs / Reviewed and Physical Exam Performed where pertinent and urgently required    Lab / Radiology  studies / ABG / Meds -  reviewed and interpreted into the assessment and treatment plan.      Assessment/Plan/Therapeutic interventions      Neuro - Prior known watershed infarcts. CTH now with mass effect, cerebral edema, and loss of gray-white matter differentiation. Neurosurgery consulted with intervention offered. Currently off sedation at this time. ASA 81mg daily    CV -  Phenylephrine gtt for shock state. Actively titrating to maintain MAP >65. Start NS in attempt to wean pressors/increase Na+. RLE ischemia; full AC w/ Eliquis. Vascular following, not surgical candidate this time.    Pulm -  Acute hypoxic respiratory failure. Currently on full mechanical ventilation 40% FiO2 PEEP+10. Actively titrating for O2 sat >90%. COVId-19 pneumonia s/p course of Decadron. Standing Bronchodilators    GI - Tube feeds as tolerated. GI PPX w/ Protonix    Renal - Stable renal fxn. Adequate UO. Even to negative fluid balance as tolerated by hemodynamics and renal fx.  Feeds to be provided in lieu of IVF.     Heme - Full AC w/ Eliquis    ID - COVID-19 s/p course of Remdisivir. Empiric abx coverage w/ Cefepime for RLE ischemia.     Endo -  Aggressive glycemic control to limit FS glucose to < 180mg/dl.      COVID 19 specific considerations and therapeutic  options based on the available and rapidly changing literature    Goals of care considerations:  Ongoing assessment for patient specific treatment options based on progression or decline.  I have involved the family with updates and requests in guidance for medical decision making.          38  Minutes of critical care tiem spent in the management of this critically ill COVID-19 patient/PUI patient with continuous assessments and interventions based on the interpretation of multiple databases.   Patient is a 80y old  Male who presents with a chief complaint of acute bilateral ICA CVA's / global cytotoxic edema (29 Mar 2021 19:38)      BRIEF HOSPITAL COURSE: 80y Male pmhx Hepatitis C, CKD3, HFrEF (35%) who presented with SOB. Admitted with COVID-19 viral pneumonia. Hospital course complicated by right sided weakness with new watershed CVA. RRt called for worsening metnal status and patient subsequently intubated for poor mental status. Hospital course further complicated by new evolvin CVA, hypoxic respiratory failure, and RLE limb ischemia    Events last 24 hours: This AM patient noted to have poor pupillary response. CTH demonstrated B/L CHENG infarcts with mass effect, cerebral edema, and loss of gray-white matter differentiation. At bedside patient is unresponsive on ventilator.    PAST MEDICAL & SURGICAL HISTORY:  Liver dysfunction    CKD (chronic kidney disease)    CHF (congestive heart failure)    Hepatitis C          Hosp day #12d    Vent day #  Mode: AC/ CMV (Assist Control/ Continuous Mandatory Ventilation)  RR (machine): 24  TV (machine): 375  FiO2: 40  PEEP: 10  ITime: 1  MAP: 15  PIP: 35        Vital signs / Reviewed and Physical Exam Performed where pertinent and urgently required    Lab / Radiology  studies / ABG / Meds -  reviewed and interpreted into the assessment and treatment plan.      Problems  1) Acute CVA  2) Cerebral edema  3) Acute hypoxic respiratory failure  4) Shock  5) Transaminitis  6) COVID-19  7) RLE ischemia    Assessment/Plan/Therapeutic interventions      Neuro - Prior known watershed infarcts. CTH now with mass effect, cerebral edema, and loss of gray-white matter differentiation. Neurosurgery consulted with intervention offered. Currently off sedation at this time. ASA 81mg daily    CV -  Phenylephrine gtt for shock state. Actively titrating to maintain MAP >65. Start NS in attempt to wean pressors/increase Na+. RLE ischemia; full AC w/ Eliquis. Vascular following, not surgical candidate this time.    Pulm -  Acute hypoxic respiratory failure. Currently on full mechanical ventilation 40% FiO2 PEEP+10. Actively titrating for O2 sat >90%. COVId-19 pneumonia s/p course of Decadron. Standing Bronchodilators    GI - Tube feeds as tolerated. GI PPX w/ Protonix. Transaminitis likely in setting of viral infxn, monitor LFTs    Renal - Stable renal fxn. Adequate UO. Even to negative fluid balance as tolerated by hemodynamics and renal fx.      Heme - Full AC w/ Eliquis    ID - COVID-19 s/p course of Remdisivir. Empiric abx coverage w/ Cefepime for RLE ischemia.     Endo -  Aggressive glycemic control to limit FS glucose to < 180mg/dl.      COVID 19 specific considerations and therapeutic  options based on the available and rapidly changing literature    Goals of care considerations:  Ongoing assessment for patient specific treatment options based on progression or decline.  I have involved the family with updates and requests in guidance for medical decision making.          38  Minutes of critical care tiem spent in the management of this critically ill COVID-19 patient/PUI patient with continuous assessments and interventions based on the interpretation of multiple databases.

## 2021-03-29 NOTE — PROGRESS NOTE ADULT - SUBJECTIVE AND OBJECTIVE BOX
Date of service: 03-29-21 @ 12:27    Lying in bed in NAD  Intubated on ventilatory support  Febrile to 101.1F  Lethargic  No diarrhea reported    ROS: unobtainable    MEDICATIONS  (STANDING):  apixaban 5 milliGRAM(s) Oral every 12 hours  ascorbic acid 500 milliGRAM(s) Oral daily  aspirin  chewable 81 milliGRAM(s) Oral daily  budesonide 160 MICROgram(s)/formoterol 4.5 MICROgram(s) Inhaler 2 Puff(s) Inhalation two times a day  carvedilol 25 milliGRAM(s) Oral every 12 hours  cefepime   IVPB 2000 milliGRAM(s) IV Intermittent every 12 hours  chlorhexidine 0.12% Liquid 15 milliLiter(s) Oral Mucosa every 12 hours  doxazosin 2 milliGRAM(s) Oral at bedtime  enalapril 2.5 milliGRAM(s) Oral daily  midazolam Infusion 0.02 mG/kG/Hr (1.19 mL/Hr) IV Continuous <Continuous>  pantoprazole  Injectable 40 milliGRAM(s) IV Push every 12 hours  polyethylene glycol 3350 17 Gram(s) Oral daily  zinc sulfate 220 milliGRAM(s) Oral daily    Vital Signs Last 24 Hrs  T(C): 37.1 (29 Mar 2021 04:00), Max: 38.4 (28 Mar 2021 18:00)  T(F): 98.8 (29 Mar 2021 04:00), Max: 101.1 (28 Mar 2021 18:00)  HR: 83 (29 Mar 2021 12:17) (78 - 115)  BP: 104/53 (29 Mar 2021 08:00) (87/55 - 142/96)  BP(mean): 65 (29 Mar 2021 08:00) (59 - 103)  RR: 32 (29 Mar 2021 08:00) (24 - 43)  SpO2: 95% (29 Mar 2021 12:17) (91% - 100%)  Mode: AC/ CMV (Assist Control/ Continuous Mandatory Ventilation), RR (machine): 24, TV (machine): 375, FiO2: 40, PEEP: 10, MAP: 12, PIP: 22   Physical exam:    Constitutional:  No acute distress  HEENT: NC/AT, EOMI, PERRLA, conjunctivae clear  Neck: supple; thyroid not palpable  Back: no tenderness  Respiratory: respiratory effort normal; crackles at bases; few rhonchi  Cardiovascular: S1S2 regular, no murmurs  Abdomen: soft, not tender, not distended, positive BS  Genitourinary: no suprapubic tenderness  Lymphatic: no LN palpable  Musculoskeletal: no muscle tenderness, no joint swelling or tenderness  Extremities: no pedal edema  right foot dark, erythema discoloration; tender  Neurological/ Psychiatric: confused; moving all extremities  Skin: no rashes; no palpable lesions    Labs: reviewed                        11.9   39.10 )-----------( 453      ( 29 Mar 2021 06:56 )             36.2     03-29    138  |  109<H>  |  63<H>  ----------------------------<  118<H>  4.5   |  25  |  0.75    Ca    8.9      29 Mar 2021 06:56  Phos  3.6     03-29  Mg     2.6     03-29    TPro  6.1  /  Alb  1.9<L>  /  TBili  0.4  /  DBili  0.2  /  AST  129<H>  /  ALT  98<H>  /  AlkPhos  145<H>  03-28    D-Dimer Assay, Quantitative: 68073 ng/mL DDU (03-22-21 @ 07:24)  C-Reactive Protein, Serum: 82 mg/L (03-22-21 @ 07:24)  C-Reactive Protein, Serum: 78 mg/L (03-22-21 @ 05:00)  D-Dimer Assay, Quantitative: 3122 ng/mL DDU (03-20-21 @ 07:08)  Ferritin, Serum: 624 ng/mL (03-20-21 @ 07:08)  C-Reactive Protein, Serum: 35 mg/L (03-20-21 @ 07:08)  D-Dimer Assay, Quantitative: 2000 ng/mL DDU (03-18-21 @ 17:14)  C-Reactive Protein, Serum: 273 mg/L (03-17-21 @ 12:38)  D-Dimer Assay, Quantitative: 2434 ng/mL DDU (03-17-21 @ 12:38)  Ferritin, Serum: 475 ng/mL (03-17-21 @ 12:38)                        12.4   24.61 )-----------( 476      ( 26 Mar 2021 07:45 )             38.3     03-26    143  |  112<H>  |  60<H>  ----------------------------<  113<H>  4.5   |  26  |  0.84    Ca    8.5      26 Mar 2021 07:45  Phos  2.9     03-26  Mg     2.7     03-26    TPro  6.2  /  Alb  1.9<L>  /  TBili  0.5  /  DBili  0.3<H>  /  AST  137<H>  /  ALT  102<H>  /  AlkPhos  154<H>  03-26    D-Dimer Assay, Quantitative: 41872 ng/mL DDU (03-22-21 @ 07:24)  C-Reactive Protein, Serum: 82 mg/L (03-22-21 @ 07:24)  C-Reactive Protein, Serum: 78 mg/L (03-22-21 @ 05:00)  D-Dimer Assay, Quantitative: 3122 ng/mL DDU (03-20-21 @ 07:08)  Ferritin, Serum: 624 ng/mL (03-20-21 @ 07:08)  C-Reactive Protein, Serum: 35 mg/L (03-20-21 @ 07:08)  D-Dimer Assay, Quantitative: 2000 ng/mL DDU (03-18-21 @ 17:14)  C-Reactive Protein, Serum: 273 mg/L (03-17-21 @ 12:38)  D-Dimer Assay, Quantitative: 2434 ng/mL DDU (03-17-21 @ 12:38)  Ferritin, Serum: 475 ng/mL (03-17-21 @ 12:38)                        12.1   24.66 )-----------( 557      ( 22 Mar 2021 07:24 )             37.7     03-22    147<H>  |  114<H>  |  53<H>  ----------------------------<  115<H>  4.1   |  26  |  1.09    Ca    8.7      22 Mar 2021 07:24  Phos  4.2     03-22  Mg     2.7     03-22    TPro  6.9  /  Alb  2.1<L>  /  TBili  0.7  /  DBili  x   /  AST  67<H>  /  ALT  68  /  AlkPhos  132<H>  03-22                        11.5   12.36 )-----------( 486      ( 18 Mar 2021 09:04 )             34.2     03-19    x   |  x   |  x   ----------------------------<  x   x    |  x   |  0.91    Ca    9.4      18 Mar 2021 09:04    TPro  7.3  /  Alb  2.1<L>  /  TBili  0.4  /  DBili  0.2  /  AST  54<H>  /  ALT  59  /  AlkPhos  121<H>  03-19      D-Dimer Assay, Quantitative: 2000 ng/mL DDU (03-18-21 @ 17:14)  C-Reactive Protein, Serum: 273 mg/L (03-17-21 @ 12:38)  D-Dimer Assay, Quantitative: 2434 ng/mL DDU (03-17-21 @ 12:38)  Ferritin, Serum: 475 ng/mL (03-17-21 @ 12:38)      Culture - Blood (collected 17 Mar 2021 13:27)  Source: .Blood None  Preliminary Report (18 Mar 2021 19:02):    No growth to date.    Culture - Blood (collected 17 Mar 2021 12:38)  Source: .Blood Blood-Peripheral  Preliminary Report (18 Mar 2021 17:02):    No growth to date.    Culture - Urine (collected 17 Mar 2021 04:15)  Source: .Urine Clean Catch (Midstream)  Final Report (19 Mar 2021 09:57):    No growth    COVID-19 PCR: Detected (03-17-21 @ 14:45)    Radiology: all available radiological tests reviewed    < from: CT Angio Chest PE Protocol w/ IV Cont (03.17.21 @ 14:20) >  Bilateral infiltrates consistent with COVID 19 pneumonia.  No evidence of pulmonary embolus.  < end of copied text >      Advanced directives addressed: full resuscitation

## 2021-03-30 NOTE — PROGRESS NOTE ADULT - ASSESSMENT
81 y/o male with hepatitis C, liver dysfunction, chronic HFrEF (35%) and CKD stage III who presents to the ED c/o SOB.  Pt tested COVID + on 3/7/21.  As per PMD, patient's girlfriend    Pt's O2 at home in 80s prompting him to come to ED.  In the ER, sats 89% on RA.  Patient started on 5L NC and sats improved to upper 90's.  WBC elevated @ 13.  DDimer high 2434.  CTA negative for PE but positive for bilateral ground glass opacities c/w COVID-19  Hospital course complicated by worsening mentation.  Brain MR revealed L watershed infarcts and PAFib started on AC .Pt on vent and unresponsive .     # Severe Bilateral Cerebral dysfunction due to Bilateral extensive CVAs   involving bilateral MCA and CHENG territories of both hemispheres with edema  -Clinical neurological  exam indicates without any significant brainstem or cerebral function at bedside .  -Meaningful recovery is poor.  -Continue supportive care.  -Discussed with Dr. Saldaña

## 2021-03-30 NOTE — PROGRESS NOTE ADULT - PROVIDER SPECIALTY LIST ADULT
Critical Care
Hospitalist
MICU
MICU
Critical Care
Hospitalist
Infectious Disease
Neurology
Neurosurgery
Vascular Surgery
Vascular Surgery
Infectious Disease
Neurology
Critical Care
Critical Care
Infectious Disease
Critical Care

## 2021-03-30 NOTE — PROGRESS NOTE ADULT - SUBJECTIVE AND OBJECTIVE BOX
· Reason for Admission	acute bilateral ICA CVA's / global cytotoxic edema  79 y/o male with hepatitis C, liver dysfunction, chronic HFrEF (35%) and CKD stage III who presents to the ED c/o SOB.  Pt tested COVID + on 3/7/21.  As per PMD, patient's girlfriend.   Pt's O2 at home in 80s prompting him to come to ED, bilateral ground glass opacities c/w COVID-19.  Hospital course complicated by worsening mentation.  Brain MR revealed L watershed infarcts and PAFib started on AC. Neurology evaluation requested for follow up and prognosis. Pt on Vent off sedation . Not responsive or follow any commands.     MEDICATIONS  (STANDING):  apixaban 5 milliGRAM(s) Oral every 12 hours    ascorbic acid 500 milliGRAM(s) Oral daily  aspirin  chewable 81 milliGRAM(s) Oral daily  budesonide 160 MICROgram(s)/formoterol 4.5 MICROgram(s) Inhaler 2 Puff(s) Inhalation two times a day  cefepime   IVPB 2000 milliGRAM(s) IV Intermittent every 12 hours  chlorhexidine 0.12% Liquid 15 milliLiter(s) Oral Mucosa every 12 hours  doxazosin 2 milliGRAM(s) Oral at bedtime  norepinephrine Infusion 0.05 MICROgram(s)/kG/Min (2.78 mL/Hr) IV Continuous <Continuous>  pantoprazole  Injectable 40 milliGRAM(s) IV Push every 12 hours  phenylephrine    Infusion 1 MICROgram(s)/kG/Min (22.3 mL/Hr) IV Continuous <Continuous>  polyethylene glycol 3350 17 Gram(s) Oral daily  sodium chloride 0.9%. 1000 milliLiter(s) (75 mL/Hr) IV Continuous <Continuous>  vasopressin Infusion 0.02 Unit(s)/Min (1.2 mL/Hr) IV Continuous <Continuous>  zinc sulfate 220 milliGRAM(s) Oral daily      Vital Signs Last 24 Hrs  T(C): 35.3 (30 Mar 2021 05:00), Max: 36.1 (29 Mar 2021 19:15)  T(F): 95.5 (30 Mar 2021 05:00), Max: 96.9 (29 Mar 2021 19:15)  HR: 107 (30 Mar 2021 09:00) (74 - 107)  BP: 82/36 (30 Mar 2021 09:00) (51/42 - 137/67)  BP(mean): 48 (30 Mar 2021 09:00) (44 - 85)  RR: 30 (30 Mar 2021 09:00) (19 - 31)  SpO2: 88% (30 Mar 2021 09:00) (88% - 98%)    Neurological Examination:  MS: unresponsive, off sedation   CN: pupils 3-4 mm, non reactive, no Dolls eyes, no corneal response, face grossly symm, no gag  Motor: Flaccid in all extremities, no spontaneous  movements   Sens: no response to painful stim.    Reflexes: 0/4 all over, mute toes b/l  Coord:  cannot test   Gait: Cannot test                          11.9   39.10 )-----------( 453      ( 29 Mar 2021 06:56 )             36.2     03-29    138  |  109<H>  |  63<H>  ----------------------------<  118<H>  4.5   |  25  |  0.75    Ca    8.9      29 Mar 2021 06:56  Phos  3.6     03-29  Mg     2.6     03-29 03-27 Chol -- LDL -- HDL -- Trig 273<H>, 03-26 Chol -- LDL -- HDL -- Trig 3147<H>    Radiology report:  - CT Head  < from: CT Head No Cont (03.29.21 @ 06:47) >  IMPRESSION:    Bilateral CHENG and MCA infarcts, edema, mass effect, loss of gray-white distinction in bilateral cerebral hemispheres with sulcal and cisternal effacement.  Findings discussed at immediate time of review with Dr. Concepcion in the [facility] neuro ICU, 3/29/2021 at 7:30 AM.    < from: CT Head No Cont (03.21.21 @ 22:00) >  IMPRESSION:  Evolving infarcts in both frontal and left parietal lobes without significant hemorrhagic conversion. New infarct in the head of the right caudate nucleus. No midline shift or hydrocephalus.      - MRI brain  < from: MR Head No Cont (03.18.21 @ 15:21) >  History left-sided weakness and confusion    There is moderate cortical diffusion restriction involving the anterior and posterior watershed zones on the left with mild right anterior involvement and a tiny focus in the left caudate head. There is associated matching cytotoxic edema without mass effect or hemorrhage. There is underlying generalized volume loss with relative preservation of white matter signal. The orbital and sellar contents and cerebellar tonsils are within normal limits. Note is made of opacification of the sphenoid sinus.    IMPRESSION:  Acute predominantly left-sided watershed distribution infarcts    < from: MR Angio Neck No Cont (03.18.21 @ 18:24) >  IMPRESSION:     This is a nondiagnostic study secondary to motion artifact, and repeat study may be obtained when patient can tolerate.

## 2021-03-30 NOTE — PROGRESS NOTE ADULT - REASON FOR ADMISSION
Acute Hypoxic respiratory Failure 2/2
COVID PNA
CVA/ COVID Infection
acute bilateral ICA CVA's / global cytotoxic edema

## 2021-03-30 NOTE — PROGRESS NOTE ADULT - SUBJECTIVE AND OBJECTIVE BOX
Patient is a 80y old  Male who presents with a chief complaint of acute bilateral ICA CVA's / global cytotoxic edema (29 Mar 2021 19:38)      BRIEF HOSPITAL COURSE: 80y Male pmhx Hepatitis C, CKD3, HFrEF (35%) who presented with SOB. Admitted with COVID-19 viral pneumonia. Hospital course complicated by right sided weakness with new watershed CVA. RRt called for worsening metnal status and patient subsequently intubated for poor mental status. Hospital course further complicated by new evolvin CVA, hypoxic respiratory failure, and RLE limb ischemia    Contacted by RN that patient with progressive hypotension despite increasing Neosynephrine gtt requirements.    PAST MEDICAL & SURGICAL HISTORY:  Liver dysfunction    CKD (chronic kidney disease)    CHF (congestive heart failure)    Hepatitis C          Hosp day #13d    Vent day #  Mode: AC/ CMV (Assist Control/ Continuous Mandatory Ventilation)  RR (machine): 24  TV (machine): 375  FiO2: 60  PEEP: 10  ITime: 1  MAP: 16  PIP: 35        Vital signs / Reviewed and Physical Exam Performed where pertinent and urgently required    Lab / Radiology  studies / ABG / Meds -  reviewed and interpreted into the assessment and treatment plan.      Problems  1) Acute CVA  2) Cerebral edema  3) Acute hypoxic respiratory failure  4) Shock  5) Transaminitis  6) COVID-19  7) RLE ischemia    Assessment/Plan/Therapeutic interventions      Neuro - Prior known watershed infarcts. CTH now with mass effect, cerebral edema, and loss of gray-white matter differentiation. Neurosurgery consulted with intervention offered. Currently off sedation at this time. ASA 81mg daily    CV -  Phenylephrine gtt for shock state. Further hypotensive tonight, increasing Phenylephrine gtt requirements. CVC placed and Levophed gtt added. Additional NS bolus ordered. Actively titrating for MAP >65. RLE ischemia; full AC w/ Eliquis. Vascular following, not surgical candidate this time.    Pulm -  Acute hypoxic respiratory failure. Currently on full mechanical ventilation 40% FiO2 PEEP+10. Actively titrating for O2 sat >90%. COVId-19 pneumonia s/p course of Decadron. Standing Bronchodilators    GI - Tube feeds as tolerated. GI PPX w/ Protonix. Transaminitis likely in setting of viral infxn, monitor LFTs    Renal - Stable renal fxn. Adequate UO. Even to negative fluid balance as tolerated by hemodynamics and renal fx.      Heme - Full AC w/ Eliquis    ID - COVID-19 s/p course of Remdisivir. Empiric abx coverage w/ Cefepime for RLE ischemia.     Endo -  Aggressive glycemic control to limit FS glucose to < 180mg/dl.      COVID 19 specific considerations and therapeutic  options based on the available and rapidly changing literature    Goals of care considerations:  Ongoing assessment for patient specific treatment options based on progression or decline.  I have involved the family with updates and requests in guidance for medical decision making.          38  Minutes of critical care tiem spent in the management of this critically ill COVID-19 patient/PUI patient with continuous assessments and interventions based on the interpretation of multiple databases.

## 2021-03-30 NOTE — PROGRESS NOTE ADULT - SUBJECTIVE AND OBJECTIVE BOX
Date of service: 03-30-21 @ 10:47    Intubated on ventilatory support  Lethargic  Fever is down    ROS: unobtainable    MEDICATIONS  (STANDING):  apixaban 5 milliGRAM(s) Oral every 12 hours  ascorbic acid 500 milliGRAM(s) Oral daily  aspirin  chewable 81 milliGRAM(s) Oral daily  budesonide 160 MICROgram(s)/formoterol 4.5 MICROgram(s) Inhaler 2 Puff(s) Inhalation two times a day  cefepime   IVPB 2000 milliGRAM(s) IV Intermittent every 12 hours  chlorhexidine 0.12% Liquid 15 milliLiter(s) Oral Mucosa every 12 hours  doxazosin 2 milliGRAM(s) Oral at bedtime  norepinephrine Infusion 0.05 MICROgram(s)/kG/Min (2.78 mL/Hr) IV Continuous <Continuous>  pantoprazole  Injectable 40 milliGRAM(s) IV Push every 12 hours  phenylephrine    Infusion 1 MICROgram(s)/kG/Min (22.3 mL/Hr) IV Continuous <Continuous>  polyethylene glycol 3350 17 Gram(s) Oral daily  sodium chloride 0.9%. 1000 milliLiter(s) (75 mL/Hr) IV Continuous <Continuous>  vasopressin Infusion 0.02 Unit(s)/Min (1.2 mL/Hr) IV Continuous <Continuous>  zinc sulfate 220 milliGRAM(s) Oral daily    Vital Signs Last 24 Hrs  T(C): 35.3 (30 Mar 2021 05:00), Max: 36.1 (29 Mar 2021 19:15)  T(F): 95.5 (30 Mar 2021 05:00), Max: 96.9 (29 Mar 2021 19:15)  HR: 107 (30 Mar 2021 09:00) (74 - 107)  BP: 82/36 (30 Mar 2021 09:00) (51/42 - 137/67)  BP(mean): 48 (30 Mar 2021 09:00) (44 - 85)  RR: 30 (30 Mar 2021 09:00) (19 - 33)  SpO2: 88% (30 Mar 2021 09:00) (88% - 98%)  Mode: AC/ CMV (Assist Control/ Continuous Mandatory Ventilation), RR (machine): 24, TV (machine): 375, FiO2: 100, PEEP: 10, ITime: 1, MAP: 17, PIP: 36   Physical exam:    Constitutional:  No acute distress  HEENT: NC/AT, EOMI, PERRLA, conjunctivae clear  Neck: supple; thyroid not palpable  Back: no tenderness  Respiratory: respiratory effort normal; crackles at bases; few rhonchi  Cardiovascular: S1S2 regular, no murmurs  Abdomen: soft, not tender, not distended, positive BS  Genitourinary: no suprapubic tenderness  Lymphatic: no LN palpable  Musculoskeletal: no muscle tenderness, no joint swelling or tenderness  Extremities: no pedal edema  right foot dark, erythema discoloration; tender  Neurological/ Psychiatric: confused; moving all extremities  Skin: no rashes; no palpable lesions    Labs: reviewed                        11.9   39.10 )-----------( 453      ( 29 Mar 2021 06:56 )             36.2     03-29    138  |  109<H>  |  63<H>  ----------------------------<  118<H>  4.5   |  25  |  0.75    Ca    8.9      29 Mar 2021 06:56  Phos  3.6     03-29  Mg     2.6     03-29    D-Dimer Assay, Quantitative: 18646 ng/mL DDU (03-22-21 @ 07:24)  C-Reactive Protein, Serum: 82 mg/L (03-22-21 @ 07:24)  C-Reactive Protein, Serum: 78 mg/L (03-22-21 @ 05:00)  D-Dimer Assay, Quantitative: 3122 ng/mL DDU (03-20-21 @ 07:08)  Ferritin, Serum: 624 ng/mL (03-20-21 @ 07:08)  C-Reactive Protein, Serum: 35 mg/L (03-20-21 @ 07:08)  D-Dimer Assay, Quantitative: 2000 ng/mL DDU (03-18-21 @ 17:14)                 12.4   24.61 )-----------( 476      ( 26 Mar 2021 07:45 )             38.3     03-26    143  |  112<H>  |  60<H>  ----------------------------<  113<H>  4.5   |  26  |  0.84    Ca    8.5      26 Mar 2021 07:45  Phos  2.9     03-26  Mg     2.7     03-26    TPro  6.2  /  Alb  1.9<L>  /  TBili  0.5  /  DBili  0.3<H>  /  AST  137<H>  /  ALT  102<H>  /  AlkPhos  154<H>  03-26    D-Dimer Assay, Quantitative: 18419 ng/mL DDU (03-22-21 @ 07:24)  C-Reactive Protein, Serum: 82 mg/L (03-22-21 @ 07:24)  C-Reactive Protein, Serum: 78 mg/L (03-22-21 @ 05:00)  D-Dimer Assay, Quantitative: 3122 ng/mL DDU (03-20-21 @ 07:08)  Ferritin, Serum: 624 ng/mL (03-20-21 @ 07:08)  C-Reactive Protein, Serum: 35 mg/L (03-20-21 @ 07:08)  D-Dimer Assay, Quantitative: 2000 ng/mL DDU (03-18-21 @ 17:14)  C-Reactive Protein, Serum: 273 mg/L (03-17-21 @ 12:38)  D-Dimer Assay, Quantitative: 2434 ng/mL DDU (03-17-21 @ 12:38)  Ferritin, Serum: 475 ng/mL (03-17-21 @ 12:38)                        12.1   24.66 )-----------( 557      ( 22 Mar 2021 07:24 )             37.7     03-22    147<H>  |  114<H>  |  53<H>  ----------------------------<  115<H>  4.1   |  26  |  1.09    Ca    8.7      22 Mar 2021 07:24  Phos  4.2     03-22  Mg     2.7     03-22    TPro  6.9  /  Alb  2.1<L>  /  TBili  0.7  /  DBili  x   /  AST  67<H>  /  ALT  68  /  AlkPhos  132<H>  03-22                        11.5   12.36 )-----------( 486      ( 18 Mar 2021 09:04 )             34.2     03-19    x   |  x   |  x   ----------------------------<  x   x    |  x   |  0.91    Ca    9.4      18 Mar 2021 09:04    TPro  7.3  /  Alb  2.1<L>  /  TBili  0.4  /  DBili  0.2  /  AST  54<H>  /  ALT  59  /  AlkPhos  121<H>  03-19      D-Dimer Assay, Quantitative: 2000 ng/mL DDU (03-18-21 @ 17:14)  C-Reactive Protein, Serum: 273 mg/L (03-17-21 @ 12:38)  D-Dimer Assay, Quantitative: 2434 ng/mL DDU (03-17-21 @ 12:38)  Ferritin, Serum: 475 ng/mL (03-17-21 @ 12:38)      Culture - Blood (collected 17 Mar 2021 13:27)  Source: .Blood None  Preliminary Report (18 Mar 2021 19:02):    No growth to date.    Culture - Blood (collected 17 Mar 2021 12:38)  Source: .Blood Blood-Peripheral  Preliminary Report (18 Mar 2021 17:02):    No growth to date.    Culture - Urine (collected 17 Mar 2021 04:15)  Source: .Urine Clean Catch (Midstream)  Final Report (19 Mar 2021 09:57):    No growth    COVID-19 PCR: Detected (03-17-21 @ 14:45)    Radiology: all available radiological tests reviewed    < from: CT Angio Chest PE Protocol w/ IV Cont (03.17.21 @ 14:20) >  Bilateral infiltrates consistent with COVID 19 pneumonia.  No evidence of pulmonary embolus.  < end of copied text >      Advanced directives addressed: full resuscitation

## 2021-03-30 NOTE — PROCEDURE NOTE - NSPROCDETAILS_GEN_ALL_CORE
patient pre-oxygenated, tube inserted, placement confirmed
audible air bolus/orogastric
guidewire recovered/lumen(s) aspirated and flushed/sterile dressing applied/sterile technique, catheter placed/ultrasound guidance with use of sterile gel and probe cove

## 2021-03-30 NOTE — PROGRESS NOTE ADULT - MENTAL STATUS
unresponisve, on versed drip, overbreathes ventilator.
no response to noxious stimuli, no gag, no corneals, no dolls eyes, no cold calorics, no spontaneous respiratorys

## 2021-03-30 NOTE — PROCEDURE NOTE - ADDITIONAL PROCEDURE DETAILS
Patient with increasing vasopressor requirements  Emergent Triple lumen CVC placed under US guidance into right IJ for shock state  CXR ordered to confirm position
performed independent of CCT
performed indpendent of CCT

## 2021-03-30 NOTE — PROGRESS NOTE ADULT - SUBJECTIVE AND OBJECTIVE BOX
Events Overnight:  Patient pupils fixed dilated now on pressors    HPI:      81 y/o male with hepatitis C, liver dysfunction, chronic HFrEF (35%) and CKD stage III who presents to the ED c/o SOB.  Pt tested COVID + on 3/7/21.  As per PMD, patient's girlfriend    Pt's O2 at home in 80s prompting him to come to ED.  In the ER, sats 89% on RA.  Patient started on 5L NC and sats improved to upper 90's.  WBC elevated @ 13.  DDimer high 2434.  CTA negative for PE but positive for bilateral ground glass opacities c/w COVID-19  Hospital course complicated by worsening mentation.  Brain MR revealed L watershed infarcts and PAFib started on AC  carotids Duplex  had shown, 70 % stenosis on right, 50% on left  3/30 ct down yesterday now shows severe bilateral CHENG, MCA infarcts, patient clininally with pupils fixed and dilated,         on escalating doses of pressors  ROS cant obtain    MEDICATIONS  (STANDING):  apixaban 5 milliGRAM(s) Oral every 12 hours  ascorbic acid 500 milliGRAM(s) Oral daily  aspirin  chewable 81 milliGRAM(s) Oral daily  budesonide 160 MICROgram(s)/formoterol 4.5 MICROgram(s) Inhaler 2 Puff(s) Inhalation two times a day  cefepime   IVPB 2000 milliGRAM(s) IV Intermittent every 12 hours  chlorhexidine 0.12% Liquid 15 milliLiter(s) Oral Mucosa every 12 hours  doxazosin 2 milliGRAM(s) Oral at bedtime  norepinephrine Infusion 0.05 MICROgram(s)/kG/Min (2.78 mL/Hr) IV Continuous <Continuous>  pantoprazole  Injectable 40 milliGRAM(s) IV Push every 12 hours  phenylephrine    Infusion 1 MICROgram(s)/kG/Min (22.3 mL/Hr) IV Continuous <Continuous>  polyethylene glycol 3350 17 Gram(s) Oral daily  sodium chloride 0.9%. 1000 milliLiter(s) (75 mL/Hr) IV Continuous <Continuous>  vasopressin Infusion 0.02 Unit(s)/Min (1.2 mL/Hr) IV Continuous <Continuous>  zinc sulfate 220 milliGRAM(s) Oral daily    MEDICATIONS  (PRN):  acetaminophen   Tablet .. 650 milliGRAM(s) Oral every 4 hours PRN Temp greater or equal to 38.5C (101.3F)  ALBUTerol    90 MICROgram(s) HFA Inhaler 2 Puff(s) Inhalation every 4 hours PRN Shortness of Breath and/or Wheezing  ondansetron Injectable 4 milliGRAM(s) IV Push every 6 hours PRN Nausea and/or Vomiting  senna 2 Tablet(s) Oral at bedtime PRN Constipation    ICU Vital Signs Last 24 Hrs  T(C): 35.3 (30 Mar 2021 05:00), Max: 36.3 (29 Mar 2021 10:00)  T(F): 95.5 (30 Mar 2021 05:00), Max: 97.4 (29 Mar 2021 10:00)  HR: 94 (30 Mar 2021 06:00) (74 - 94)  BP: 107/55 (30 Mar 2021 06:00) (51/42 - 131/67)  BP(mean): 65 (30 Mar 2021 06:00) (44 - 85)  ABP: --  ABP(mean): --  RR: 24 (30 Mar 2021 06:00) (19 - 39)  SpO2: 94% (30 Mar 2021 06:00) (89% - 98%)      Mode: AC/ CMV (Assist Control/ Continuous Mandatory Ventilation)  RR (machine): 24  TV (machine): 375  FiO2: 70  PEEP: 10  ITime: 1  MAP: 10  PIP: 35      I&O's Summary    29 Mar 2021 07:01  -  30 Mar 2021 07:00  --------------------------------------------------------  IN: 3578 mL / OUT: 2105 mL / NET: 1473 mL                        11.9   39.10 )-----------( 453      ( 29 Mar 2021 06:56 )             36.2       03-29    138  |  109<H>  |  63<H>  ----------------------------<  118<H>  4.5   |  25  |  0.75    Ca    8.9      29 Mar 2021 06:56  Phos  3.6     03-29  Mg     2.6     03-29  ABG - ( 30 Mar 2021 06:38 )  pH, Arterial: 7.19  pH, Blood: x     /  pCO2: 52    /  pO2: 105   / HCO3: 19    / Base Excess: -8.5  /  SaO2: 96        DVT Prophylaxis:  Apixaban

## 2021-03-30 NOTE — PROGRESS NOTE ADULT - ASSESSMENT
79 y/o male with h/o chronic hepatitis C, liver dysfunction, chronic CHF, CKD stage III, recently diagnosed with COVID-19 viral syndrome was admitted on 3/17 for worsening SOB.  Pt tested COVID + on 3/7/21. The pt is a poor historian at baseline. Pt's O2 at home in 80s prompting him to come to ED. In the ER, sats 89% on RA.  Patient started on 5L NC and sats improved to upper 90's.     1. COVID-19 viral syndrome. Acute respiratory failure. Multifocal pneumonia. Right foot ischemia ?underlying cellulitis. CVA with severe bilateral CHENG, MCA infarcts.  -febrile  -respiratory frail  -encephalopathy  -leukocytosis is persistent  -elevated d-dimers  -s/p remdesivir protocol # 10  -on cefepime 2 gm IV q12h # 8  -tolerating abx well so far; no side effects noted  -AC  -steroids  -respiratory care  -droplet isolation  -continue  antimicrobial therapy foe now  -prognosis is poor  -monitor temps  -f/u CBC  -supportive care  2. Other issues:   -care per medicine    Case d/w Dr. Saldaña

## 2021-03-30 NOTE — PROGRESS NOTE ADULT - NUTRITIONAL ASSESSMENT
This patient has been assessed with a concern for Malnutrition and has been determined to have a diagnosis/diagnoses of Severe protein-calorie malnutrition.    This patient is being managed with:   Diet NPO with Tube Feed-  Tube Feeding Modality: Orogastric  Jevity 1.5 Kiko (JEVITY1.5)  Total Volume for 24 Hours (mL): 720  Continuous  Until Goal Tube Feed Rate (mL per Hour): 30  Tube Feed Duration (in Hours): 24  Tube Feed Start Time: 00:00  Free Water Flush  Bolus   Total Volume per Flush (mL): 175   Frequency: Every 4 Hours   Total Daily Volume of Flush (mL): 700    Start Time: 08:00  No Carb Prosource TF     Qty per Day:  4  Entered: Mar 23 2021 10:34AM    
This patient has been assessed with a concern for Malnutrition and has been determined to have a diagnosis/diagnoses of Severe protein-calorie malnutrition.    This patient is being managed with:   Diet NPO with Tube Feed-  Tube Feeding Modality: Orogastric  Jevity 1.5 Kiko (JEVITY1.5)  Total Volume for 24 Hours (mL): 720  Continuous  Until Goal Tube Feed Rate (mL per Hour): 30  Tube Feed Duration (in Hours): 24  Tube Feed Start Time: 00:00  Free Water Flush  Bolus   Total Volume per Flush (mL): 200   Frequency: Every 4 Hours    Start Time: 08:00  No Carb Prosource TF     Qty per Day:  4  Entered: Mar 25 2021 12:18AM    
This patient has been assessed with a concern for Malnutrition and has been determined to have a diagnosis/diagnoses of Severe protein-calorie malnutrition.    This patient is being managed with:   Diet NPO with Tube Feed-  Tube Feeding Modality: Orogastric  Jevity 1.5 Kiko (JEVITY1.5)  Total Volume for 24 Hours (mL): 720  Continuous  Until Goal Tube Feed Rate (mL per Hour): 30  Tube Feed Duration (in Hours): 24  Tube Feed Start Time: 00:00  Free Water Flush  Bolus   Total Volume per Flush (mL): 175   Frequency: Every 4 Hours   Total Daily Volume of Flush (mL): 700    Start Time: 08:00  No Carb Prosource TF     Qty per Day:  4  Entered: Mar 23 2021 10:34AM    
This patient has been assessed with a concern for Malnutrition and has been determined to have a diagnosis/diagnoses of Severe protein-calorie malnutrition.    This patient is being managed with:   Diet NPO with Tube Feed-  Tube Feeding Modality: Orogastric  Jevity 1.5 Kiko (JEVITY1.5)  Total Volume for 24 Hours (mL): 720  Continuous  Until Goal Tube Feed Rate (mL per Hour): 30  Tube Feed Duration (in Hours): 24  Tube Feed Start Time: 00:00  Free Water Flush  Bolus   Total Volume per Flush (mL): 200   Frequency: Every 4 Hours    Start Time: 08:00  No Carb Prosource TF     Qty per Day:  4  Entered: Mar 25 2021 12:18AM    

## 2021-03-30 NOTE — PROGRESS NOTE ADULT - ASSESSMENT
Patient with massive bilateral stroke thrombotic secondary to covid, now with shock  and likely cerebral edema progressing to brain death  will need confirmatory test who brain death declaration, given COvid pneumonia and high oxgyen  may need to do nuclear flow study  brother updated on situation  continue pressor support for now

## 2021-03-30 NOTE — PROGRESS NOTE ADULT - CRITICAL CARE SERVICES PROVIDED
Critical care services provided

## 2021-03-30 NOTE — PROGRESS NOTE ADULT - GASTROINTESTINAL DETAILS
soft/nontender
soft/nontender/no guarding/no rigidity
soft/nontender

## 2021-03-30 NOTE — CHART NOTE - NSCHARTNOTEFT_GEN_A_CORE
Clinically patient with     no gag, no spontaneous moviemnt, no response to noxious stimuli, no corneals, no dolls eyes, no response to cold calorics     could not do apnea test due to hypoxia from covid     brother refused to allow nuclear flow study to be done     Ptient likely with brain death.     on maximum dose of pressures, will cap doses  prognosis is grave Clinically patient with     no gag, no spontaneous moviemnt, no response to noxious stimuli, no corneals, no dolls eyes, no response to cold calorics     could not do apnea test due to hypoxia from covid     brother refused to allow nuclear flow study to be done     Ptient likely with brain death.     on maximum dose of pressures, will cap doses  prognosis is grave        45 minues critical care time

## 2021-03-31 LAB
RAPID RVP RESULT: DETECTED
SARS-COV-2 RNA SPEC QL NAA+PROBE: DETECTED

## 2021-03-31 NOTE — DISCHARGE NOTE FOR THE EXPIRED PATIENT - HOSPITAL COURSE
Pt is a 79 y/o male with PMHx of Hepatitis C, CKD3, HFrEF (35%) who presented with SOB. Admitted with COVID-19 viral pneumonia. Hospital course complicated by right sided weakness with new watershed CVA. RRt called for worsening metnal status and patient subsequently intubated for poor mental status. Hospital course further complicated by new evolving CVA, hypoxic respiratory failure, and RLE limb ischemia. Developed worsening triple pressor shock. Found to have new severe bilateral CHENG, MCA infarcts on CT. This evening with worsening shock, hypotensive despite 3 max pressors. Hypoxic on max vent support. Neuro exam w/ no brain stem function. Pt w/ bradycardia into asytole. Prounounced  @ 1003. Brother Xavier notified.

## 2021-03-31 NOTE — DISCHARGE NOTE FOR THE EXPIRED PATIENT - NAME OF PERSON OFFERED
Thank you for coming to see us in clinic today.  The treatment plan as outlined below    - Continue taking all your medicines as prescribed  - I refilled your ventolin  - start using Symbicort inhaler twice a day  - start using incruse ellipta inhaler once a day  - Wear compression socks for swelling and also elevate your legs  - If your blood sugar is consistently less than 80 or greater than 300, please call Dr. Longoria's office  
Xavier

## 2021-03-31 NOTE — ED ADULT NURSE NOTE - NS ED NURSE LEVEL OF CONSCIOUSNESS ORIENTATION
Has been having sinus pain. Has a headache, pain. Pain is a constant ringing in the ears. Went to urgent care a few days ago, was recommended sudafed and fluids. Had covid test and was negative. No fever or coughing, waking up there is a little tingling in his throat. No blood in the sputum. Denies any nausea vomiting or diarrhea. Pain with blowing nose or in seated position. Laying can feel pain in the head area around the ears. Nothing in forehead. Nasal drainage changed from a yellow color to a whitish color, thickened. Sudafed has not helped his symptoms too much.     Pain is located in the L ear greater than the right. Also feels like there is pressure in his head. Feels a little dizzy, cloudy.    Happened 2.5 years ago, went to doctor and they told him that there was a ear and sinus infection. Was given a large antibiotic tablet and after 2 days pain was gone.     Advised patient to schedule appointment tomorrow at [facility] for in person evaluation of symptoms. [facility] to wait until tomorrow[facility].   Disoriented

## 2021-04-08 DIAGNOSIS — T82.868A THROMBOSIS DUE TO VASCULAR PROSTHETIC DEVICES, IMPLANTS AND GRAFTS, INITIAL ENCOUNTER: ICD-10-CM

## 2021-04-08 DIAGNOSIS — R57.9 SHOCK, UNSPECIFIED: ICD-10-CM

## 2021-04-08 DIAGNOSIS — Z78.1 PHYSICAL RESTRAINT STATUS: ICD-10-CM

## 2021-04-08 DIAGNOSIS — A41.89 OTHER SPECIFIED SEPSIS: ICD-10-CM

## 2021-04-08 DIAGNOSIS — B18.2 CHRONIC VIRAL HEPATITIS C: ICD-10-CM

## 2021-04-08 DIAGNOSIS — G81.91 HEMIPLEGIA, UNSPECIFIED AFFECTING RIGHT DOMINANT SIDE: ICD-10-CM

## 2021-04-08 DIAGNOSIS — I48.0 PAROXYSMAL ATRIAL FIBRILLATION: ICD-10-CM

## 2021-04-08 DIAGNOSIS — E87.2 ACIDOSIS: ICD-10-CM

## 2021-04-08 DIAGNOSIS — J80 ACUTE RESPIRATORY DISTRESS SYNDROME: ICD-10-CM

## 2021-04-08 DIAGNOSIS — I63.412 CEREBRAL INFARCTION DUE TO EMBOLISM OF LEFT MIDDLE CEREBRAL ARTERY: ICD-10-CM

## 2021-04-08 DIAGNOSIS — R47.01 APHASIA: ICD-10-CM

## 2021-04-08 DIAGNOSIS — I50.22 CHRONIC SYSTOLIC (CONGESTIVE) HEART FAILURE: ICD-10-CM

## 2021-04-08 DIAGNOSIS — I13.0 HYPERTENSIVE HEART AND CHRONIC KIDNEY DISEASE WITH HEART FAILURE AND STAGE 1 THROUGH STAGE 4 CHRONIC KIDNEY DISEASE, OR UNSPECIFIED CHRONIC KIDNEY DISEASE: ICD-10-CM

## 2021-04-08 DIAGNOSIS — E87.0 HYPEROSMOLALITY AND HYPERNATREMIA: ICD-10-CM

## 2021-04-08 DIAGNOSIS — Z91.041 RADIOGRAPHIC DYE ALLERGY STATUS: ICD-10-CM

## 2021-04-08 DIAGNOSIS — R65.20 SEVERE SEPSIS WITHOUT SEPTIC SHOCK: ICD-10-CM

## 2021-04-08 DIAGNOSIS — G93.41 METABOLIC ENCEPHALOPATHY: ICD-10-CM

## 2021-04-08 DIAGNOSIS — M62.82 RHABDOMYOLYSIS: ICD-10-CM

## 2021-04-08 DIAGNOSIS — G93.6 CEREBRAL EDEMA: ICD-10-CM

## 2021-04-08 DIAGNOSIS — R74.01 ELEVATION OF LEVELS OF LIVER TRANSAMINASE LEVELS: ICD-10-CM
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
## 2021-04-08 DIAGNOSIS — J12.82 PNEUMONIA DUE TO CORONAVIRUS DISEASE 2019: ICD-10-CM

## 2021-04-08 DIAGNOSIS — N18.30 CHRONIC KIDNEY DISEASE, STAGE 3 UNSPECIFIED: ICD-10-CM

## 2021-04-08 DIAGNOSIS — U07.1 COVID-19: ICD-10-CM

## 2021-04-08 DIAGNOSIS — E43 UNSPECIFIED SEVERE PROTEIN-CALORIE MALNUTRITION: ICD-10-CM

## 2021-06-11 NOTE — PROGRESS NOTE ADULT - ASSESSMENT
`Diagnosis: Right sided weakness 2/2 CVA.     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 Shows worsening ischemic CVAs and new ischemia area in caudate nucleus.   -Order b/l `Doppler venous US in setting of new infarcts from 3/18 imaging to repeat on 3/21. Echo could not rule out PFO.  -Order US of b/l carotids. Hx of stenosis. MRA imaging could not evaluate due to motion artifact   -COnt. eliquis  -Order EEG to rule out subclinical siezures      Respiratory:   Type 1 respiratory Failure 2/2 viral covid PNA: Currently on  AC/ CMV RR: 24 TV: 375 FiO2: 90 PEEP: 8  Decreased Fio2 to 50% and increased Peep to 10  Cont. to wean as tolerated  ABG WNL  COvid Positive 3/17. Vral Covid inflammatory markers elevated. Currently on Day 5/5 of remdesivir and decadron 6mg  Repeat chest xray tommorow AM    Cardiovascular:   CHF w/ REF- EF 30-35%. Currently compensated(Dry and Warm)  -Cont carvedilol  -Add lisinopril 5mg when able to tolerate PO. Cont. enalipril IV  -New Onset Paroxy Afib-Chadvasc 5. On Eliquis. Rate is controlled. PRN Cardizem or Lopressor if patient reverts to RVR.      GI:   Hx of Hepatitis B. Ammonium 27 on admission  LFT's trending up 2/2 to remdesivir      :   Monitor I/O's     Hematologic:   Hbg Stable      Dispo: wean off vent as tolerated. Reassess neurological status   `Diagnosis: Right sided weakness 2/2 CVA.     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 Shows worsening ischemic CVAs and new ischemia area in caudate nucleus.   -COnt. eliquis  -Holding Atorvastatin in setting of elevated transaminases      Respiratory:   Type 1 respiratory Failure 2/2 viral covid PNA: Currently on  AC/ CMV RR: 24 TV: 375 FiO2: 90 PEEP: 8   Fio2 to 50% Peep 10  Cont. to wean as tolerated.  DC Precedex and start ativan PRN for respiratory distress  COvid Positive 3/17. Viral Covid inflammatory markers elevated. Currently on Day 7/10 of remdesivir and decadron 6mg      Cardiovascular:   CHF w/ REF- EF 30-35%. Currently compensated(Dry and Warm)  -Cont carvedilol  -Add lisinopril 5mg when able to tolerate PO. Cont. enalipril IV  -New Onset Paroxy Afib-Chadvasc 5. On Eliquis. Rate is controlled. PRN Cardizem or Lopressor if patient reverts to RVR.    -`Doppler venous US(3/22) No present DVT.  - Carotid Artery US(3/22) 70% occlussion of Right Carotid bulb. 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 Will need surgical intervention in future    GI:   Hx of Hepatitis B. Ammonium 27 on admission  LFT's trending up 2/2 to remdesivir    ID:  -T Max 100.1 this Am  - Empiric IV abx with Cefepime given RLE ischemia, Cont Remdesivir and Decadron for full course.  -Currently on day 2 of cefepime for underlining Acute arterial ischemia     -Pending 3/23 Blood Cultures  -Blood Cultures 3/17 NGTD    :   Monitor I/O's     Hematologic:   Hbg Stable      Dispo: wean off vent as tolerated. Reassess neurological status   Patient is dead based on Cardiopulmonary criteria including absent breath sounds, pulselessness and/or asystole

## 2021-12-14 NOTE — PHYSICAL THERAPY INITIAL EVALUATION ADULT - TRANSFER SKILLS, REHAB EVAL
Spoke to pt's daughter [place] with [facility] . States that pt still does have epigastric discomfort and is not able to eat properly. When asked why not able to eat properly was informed that anything pt eats just makes him feel uncomfortable. Pt has continued to take antacid medications as prescribed. Noted that pt only feels good when laying down but not when standing or walking.    Pt is scheduled to see Dr. Kwong 12/21 and has ans MRI of abd scheduled for tomorrow.   independent

## 2022-06-13 NOTE — PROCEDURE NOTE - NSANTIMICROB_VASC_A_CORE
Yes Rotation Flap Text: The defect edges were debeveled with a #15 scalpel blade.  Given the location of the defect, shape of the defect and the proximity to free margins a rotation flap was deemed most appropriate.  Using a sterile surgical marker, an appropriate rotation flap was drawn incorporating the defect and placing the expected incisions within the relaxed skin tension lines where possible.    The area thus outlined was incised deep to adipose tissue with a #15 scalpel blade.  The skin margins were undermined to an appropriate distance in all directions utilizing iris scissors.

## 2022-11-22 NOTE — PROGRESS NOTE ADULT - SUBJECTIVE AND OBJECTIVE BOX
Hospital D # 6  ICU # 2  Vent # 2    CC:  Respiratory Failure     HPI:    81 y/o male with hepatitis C, liver dysfunction, chronic HFrEF (35%) and CKD stage III who presents to the ED c/o SOB.  Pt tested COVID + on 3/7/21.  As per PMD, patient's girlfriend also has COVID.  PMD reports pt is a poor historian at baseline.  Pt's O2 at home in 80s prompting him to come to ED.  In the ER, sats 89% on RA.  Patient started on 5L NC and sats improved to upper 90's.  WBC elevated @ 13.  DDimer high 2434.  CTA negative for PE but positive for bilateral ground glass opacities c/w COVID-19  Hospital course complicated by worsening mentation.  Brain MR revealed L watershed infarcts and PAFib started on AC    3/22:  Case d/w ALVARO Brar.  Pt seen and examined in ICU.  RRT O/N for obtundation.  Intubated.  HCT reveal Evolving prior B/L frontal and L CVA and new R CN CVA . Vented on 90/8.  Not responsive off sedation.  Poor [place]     3/23:  Case d/w ALVARO Brar.  Acute ischemic RLE.  Cool.  Leukocytosis.  Tm 100.9 No awake off sedation. Doing very poorly    PMH:  As above.     PSH:  As above.     FH: Non Contributory other than those listed in HPI    Social History:  Unobtainable due to clinical condition     MEDICATIONS  (STANDING):  apixaban 5 milliGRAM(s) Oral every 12 hours  aspirin  chewable 81 milliGRAM(s) Oral daily  atorvastatin 40 milliGRAM(s) Oral at bedtime  budesonide 160 MICROgram(s)/formoterol 4.5 MICROgram(s) Inhaler 2 Puff(s) Inhalation two times a day  carvedilol 12.5 milliGRAM(s) Oral every 12 hours  chlorhexidine 0.12% Liquid 15 milliLiter(s) Oral Mucosa every 12 hours  dexAMETHasone  Injectable 6 milliGRAM(s) IV Push daily  pantoprazole  Injectable 40 milliGRAM(s) IV Push daily  propofol Infusion 10 MICROgram(s)/kG/Min (3.56 mL/Hr) IV Continuous <Continuous>  remdesivir  IVPB 100 milliGRAM(s) IV Intermittent every 24 hours    MEDICATIONS  (PRN):  acetaminophen   Tablet .. 650 milliGRAM(s) Oral every 4 hours PRN Temp greater or equal to 38.5C (101.3F)  ALBUTerol    90 MICROgram(s) HFA Inhaler 2 Puff(s) Inhalation every 4 hours PRN Shortness of Breath and/or Wheezing  aluminum hydroxide/magnesium hydroxide/simethicone Suspension 30 milliLiter(s) Oral every 4 hours PRN Dyspepsia  benzonatate 100 milliGRAM(s) Oral three times a day PRN Cough  guaifenesin/dextromethorphan  Syrup 10 milliLiter(s) Oral every 4 hours PRN Cough  ondansetron Injectable 4 milliGRAM(s) IV Push every 6 hours PRN Nausea and/or Vomiting      Allergies: NKDA    ROS:  SEE BELOW        ICU Vital Signs Last 24 Hrs  T(C): 38.3 (23 Mar 2021 04:00), Max: 38.3 (23 Mar 2021 04:00)  T(F): 100.9 (23 Mar 2021 04:00), Max: 100.9 (23 Mar 2021 04:00)  HR: 96 (23 Mar 2021 10:00) (78 - 97)  BP: 135/70 (23 Mar 2021 10:00) (107/56 - 151/66)  BP(mean): 84 (23 Mar 2021 10:00) (68 - 89)  ABP: --  ABP(mean): --  RR: 36 (23 Mar 2021 10:00) (31 - 41)  SpO2: 93% (23 Mar 2021 10:00) (87% - 97%)      Mode: AC/ CMV (Assist Control/ Continuous Mandatory Ventilation)  RR (machine): 24  TV (machine): 375  FiO2: 55  PEEP: 10  ITime: 1  MAP: 10  PIP: 19      I&O's Summary    22 Mar 2021 07:01  -  23 Mar 2021 07:00  --------------------------------------------------------  IN: 1291 mL / OUT: 875 mL / NET: 416 mL        Physical Exam:  SEE BELOW                          12.3   32.52 )-----------( 594      ( 23 Mar 2021 10:20 )             37.6       03-23    146<H>  |  117<H>  |  55<H>  ----------------------------<  118<H>  4.3   |  25  |  1.01    Ca    8.9      23 Mar 2021 04:46  Phos  2.9     03-23  Mg     3.0     03-23    TPro  7.0  /  Alb  2.0<L>  /  TBili  0.6  /  DBili  0.1  /  AST  142<H>  /  ALT  94<H>  /  AlkPhos  142<H>  03-23            ABG - ( 23 Mar 2021 05:59 )  pH, Arterial: 7.46  pH, Blood: x     /  pCO2: 36    /  pO2: 81    / HCO3: 25    / Base Excess: 1.7   /  SaO2: 95                      DVT Prophylaxis:                                                            Contraindication:     Advanced Directives:    Discussed with:    Visit Information:  Time spent excluding procedure:      ** Time is exclusive of billed procedures and/or teaching and/or routine family updates.         High Dose Vitamin A Counseling: Side effects reviewed, pt to contact office should one occur.

## 2023-10-17 NOTE — ED PROVIDER NOTE - EKG #1 DATE/TIME
Also requesting a refill of clonazepam 0.5 mg tablet, takes one tablet by mouth every evening for restless legs.   I don't see on med list.
17-Mar-2021 12:17

## 2023-12-24 NOTE — PROGRESS NOTE ADULT - SUBJECTIVE AND OBJECTIVE BOX
Dr. Chowdhury called back [phone]   Hospital D # 10  ICU # 6  Vent # 6    CC:  Respiratory Failure     HPI:    81 y/o male with hepatitis C, liver dysfunction, chronic HFrEF (35%) and CKD stage III who presents to the ED c/o SOB.  Pt tested COVID + on 3/7/21.  As per PMD, patient's girlfriend also has COVID.  PMD reports pt is a poor historian at baseline.  Pt's O2 at home in 80s prompting him to come to ED.  In the ER, sats 89% on RA.  Patient started on 5L NC and sats improved to upper 90's.  WBC elevated @ 13.  DDimer high 2434.  CTA negative for PE but positive for bilateral ground glass opacities c/w COVID-19  Hospital course complicated by worsening mentation.  Brain MR revealed L watershed infarcts and PAFib started on AC    3/22:  Case d/w ALVARO Brar.  Pt seen and examined in ICU.  RRT O/N for obtundation.  Intubated.  HCT reveal Evolving prior B/L frontal and L CVA and new R CN CVA . Vented on 90/8.  Not responsive off sedation.  Poor [place]     3/23:  Case d/w PA Zonia.  Acute ischemic RLE.  Cool.  Leukocytosis.  Tm 100.9  Not awake off sedation. Doing very poorly    3/24:  Vented and sedated.  Dannie fever O/N.  CK 1200.  R ankle with delineation of ischemia.   No pressors yet    3/25:  Vented and unresponsive off sedation. CK stable.  No pressor.  Febrile.  Brother at bedside on 3/24--condition reviewed in detail    3/26:  Vented and unresponsive off sedation.  R foot unchanged.  Febrile.  No pressors yet    3/27:   Vented.  Unresponsive off sedation.  Trig level lower. R foot ischemia progressing.  Tm 101.1 WBC 32K.  Doing very poorly    PMH:  As above.     PSH:  As above.     FH: Non Contributory other than those listed in HPI    Social History:  Unobtainable due to clinical condition     MEDICATIONS  (STANDING):  apixaban 5 milliGRAM(s) Oral every 12 hours  aspirin  chewable 81 milliGRAM(s) Oral daily  budesonide 160 MICROgram(s)/formoterol 4.5 MICROgram(s) Inhaler 2 Puff(s) Inhalation two times a day  carvedilol 25 milliGRAM(s) Oral every 12 hours  cefepime   IVPB 2000 milliGRAM(s) IV Intermittent every 12 hours  chlorhexidine 0.12% Liquid 15 milliLiter(s) Oral Mucosa every 12 hours  enalapril 2.5 milliGRAM(s) Oral daily  midazolam Infusion 0.02 mG/kG/Hr (1.19 mL/Hr) IV Continuous <Continuous>  pantoprazole  Injectable 40 milliGRAM(s) IV Push every 12 hours  polyethylene glycol 3350 17 Gram(s) Oral daily    MEDICATIONS  (PRN):  acetaminophen   Tablet .. 650 milliGRAM(s) Oral every 4 hours PRN Temp greater or equal to 38.5C (101.3F)  ALBUTerol    90 MICROgram(s) HFA Inhaler 2 Puff(s) Inhalation every 4 hours PRN Shortness of Breath and/or Wheezing  fentaNYL    Injectable 50 MICROGram(s) IV Push every 2 hours PRN vent sedation  LORazepam   Injectable 2 milliGRAM(s) IV Push every 4 hours PRN Respiratory Distress  ondansetron Injectable 4 milliGRAM(s) IV Push every 6 hours PRN Nausea and/or Vomiting  senna 2 Tablet(s) Oral at bedtime PRN Constipation      Allergies: NKDA    ROS:  SEE BELOW        ICU Vital Signs Last 24 Hrs  T(C): 37.7 (28 Mar 2021 10:00), Max: 38.4 (27 Mar 2021 23:00)  T(F): 99.8 (28 Mar 2021 10:00), Max: 101.1 (27 Mar 2021 23:00)  HR: 88 (28 Mar 2021 10:00) (81 - 116)  BP: 130/68 (28 Mar 2021 10:00) (101/56 - 154/71)  BP(mean): 84 (28 Mar 2021 10:00) (62 - 103)  ABP: --  ABP(mean): --  RR: 34 (28 Mar 2021 10:00) (30 - 39)  SpO2: 93% (28 Mar 2021 10:00) (93% - 98%)      Mode: AC/ CMV (Assist Control/ Continuous Mandatory Ventilation)  RR (machine): 24  TV (machine): 375  FiO2: 45  PEEP: 10  MAP: 11  PIP: 20      I&O's Summary    27 Mar 2021 07:01  -  28 Mar 2021 07:00  --------------------------------------------------------  IN: 2344.4 mL / OUT: 1825 mL / NET: 519.4 mL        Physical Exam:  SEE BELOW                          11.7   32.34 )-----------( 447      ( 28 Mar 2021 06:45 )             36.8       03-28    139  |  108  |  52<H>  ----------------------------<  119<H>  4.5   |  29  |  0.73    Ca    8.6      28 Mar 2021 06:45  Phos  2.8     03-28  Mg     2.4     03-28    TPro  6.1  /  Alb  1.9<L>  /  TBili  0.4  /  DBili  0.2  /  AST  129<H>  /  ALT  98<H>  /  AlkPhos  145<H>  03-28      CARDIAC MARKERS ( 27 Mar 2021 06:00 )  x     / x     / 1024 U/L / x     / x            ABG - ( 27 Mar 2021 06:02 )  pH, Arterial: 7.39  pH, Blood: x     /  pCO2: 42    /  pO2: 84    / HCO3: 25    / Base Excess: .4    /  SaO2: 96                      DVT Prophylaxis:                                                            Contraindication:     Advanced Directives:    Discussed with:    Visit Information:  Time spent excluding procedure:      ** Time is exclusive of billed procedures and/or teaching and/or routine family updates.

## 2024-01-01 NOTE — ED ADULT NURSE NOTE - NURSING NEURO LEVEL OF CONSCIOUSNESS
alert and awake Jona Saravia  Pediatrics  [address] [place] [address]  Phone: [phone]  Fax: [phone]  Follow Up Time: 1-3 days

## 2024-03-04 NOTE — ED ADULT TRIAGE NOTE - INTERNATIONAL TRAVEL
Attempted to call to remind him to get his blood work done that was ordered at his last visit. No answer and mailbox was full.   Unable to Assess

## 2024-03-25 NOTE — ED PROVIDER NOTE - RELIEVING FACTORS
[No Acute Distress] : no acute distress [Normal Oropharynx] : normal oropharynx [Normal Appearance] : normal appearance none [No Neck Mass] : no neck mass [Normal Rate/Rhythm] : normal rate/rhythm [Normal S1, S2] : normal s1, s2 [No Murmurs] : no murmurs [No Resp Distress] : no resp distress [Clear to Auscultation Bilaterally] : clear to auscultation bilaterally [No Abnormalities] : no abnormalities [Benign] : benign [Normal Gait] : normal gait [No Clubbing] : no clubbing [No Cyanosis] : no cyanosis [No Edema] : no edema [FROM] : FROM [Normal Color/ Pigmentation] : normal color/ pigmentation [No Focal Deficits] : no focal deficits [Oriented x3] : oriented x3 [Normal Affect] : normal affect

## 2025-02-04 NOTE — PROGRESS NOTE ADULT - NS NEC GEN PE MLT EXAM PC
Please Approve or Refuse.  Send to Pharmacy per Pt's Request: [facility]     Next Visit Date:  2/26/2025   Last Visit Date: 1/23/2025    Hemoglobin A1C (%)   Date Value   01/23/2025 5.9   10/22/2024 6.1   07/22/2024 5.8             ( goal A1C is < 7)   BP Readings from Last 3 Encounters:   01/23/25 110/80   01/08/25 (!) 167/97   01/07/25 (!) 109/58          (goal 120/80)  BUN   Date Value Ref Range Status   08/05/2024 20 8 - 23 mg/dL Final     Creatinine   Date Value Ref Range Status   08/05/2024 0.7 0.7 - 1.2 mg/dL Final     Potassium   Date Value Ref Range Status   08/05/2024 4.2 3.7 - 5.3 mmol/L Final            
detailed exam